# Patient Record
Sex: FEMALE | Race: WHITE | NOT HISPANIC OR LATINO | URBAN - METROPOLITAN AREA
[De-identification: names, ages, dates, MRNs, and addresses within clinical notes are randomized per-mention and may not be internally consistent; named-entity substitution may affect disease eponyms.]

---

## 2020-04-02 ENCOUNTER — OUTPATIENT (OUTPATIENT)
Dept: OUTPATIENT SERVICES | Facility: HOSPITAL | Age: 25
LOS: 1 days | Discharge: HOME | End: 2020-04-02

## 2020-04-20 ENCOUNTER — OUTPATIENT (OUTPATIENT)
Dept: OUTPATIENT SERVICES | Facility: HOSPITAL | Age: 25
LOS: 1 days | Discharge: HOME | End: 2020-04-20

## 2020-04-20 DIAGNOSIS — F41.1 GENERALIZED ANXIETY DISORDER: ICD-10-CM

## 2020-04-27 ENCOUNTER — OUTPATIENT (OUTPATIENT)
Dept: OUTPATIENT SERVICES | Facility: HOSPITAL | Age: 25
LOS: 1 days | Discharge: HOME | End: 2020-04-27

## 2020-04-28 DIAGNOSIS — F41.1 GENERALIZED ANXIETY DISORDER: ICD-10-CM

## 2020-05-01 DIAGNOSIS — F41.1 GENERALIZED ANXIETY DISORDER: ICD-10-CM

## 2020-05-06 ENCOUNTER — OUTPATIENT (OUTPATIENT)
Dept: OUTPATIENT SERVICES | Facility: HOSPITAL | Age: 25
LOS: 1 days | Discharge: HOME | End: 2020-05-06

## 2020-05-06 DIAGNOSIS — F41.1 GENERALIZED ANXIETY DISORDER: ICD-10-CM

## 2020-05-13 ENCOUNTER — OUTPATIENT (OUTPATIENT)
Dept: OUTPATIENT SERVICES | Facility: HOSPITAL | Age: 25
LOS: 1 days | Discharge: HOME | End: 2020-05-13

## 2020-05-13 DIAGNOSIS — F41.1 GENERALIZED ANXIETY DISORDER: ICD-10-CM

## 2020-05-19 ENCOUNTER — OUTPATIENT (OUTPATIENT)
Dept: OUTPATIENT SERVICES | Facility: HOSPITAL | Age: 25
LOS: 1 days | Discharge: HOME | End: 2020-05-19

## 2020-05-19 DIAGNOSIS — F33.1 MAJOR DEPRESSIVE DISORDER, RECURRENT, MODERATE: ICD-10-CM

## 2020-05-22 ENCOUNTER — OUTPATIENT (OUTPATIENT)
Dept: OUTPATIENT SERVICES | Facility: HOSPITAL | Age: 25
LOS: 1 days | Discharge: HOME | End: 2020-05-22

## 2020-05-22 DIAGNOSIS — F33.1 MAJOR DEPRESSIVE DISORDER, RECURRENT, MODERATE: ICD-10-CM

## 2020-06-04 ENCOUNTER — OUTPATIENT (OUTPATIENT)
Dept: OUTPATIENT SERVICES | Facility: HOSPITAL | Age: 25
LOS: 1 days | Discharge: HOME | End: 2020-06-04

## 2020-06-04 DIAGNOSIS — F33.1 MAJOR DEPRESSIVE DISORDER, RECURRENT, MODERATE: ICD-10-CM

## 2020-06-11 ENCOUNTER — OUTPATIENT (OUTPATIENT)
Dept: OUTPATIENT SERVICES | Facility: HOSPITAL | Age: 25
LOS: 1 days | Discharge: HOME | End: 2020-06-11

## 2020-06-11 DIAGNOSIS — F33.1 MAJOR DEPRESSIVE DISORDER, RECURRENT, MODERATE: ICD-10-CM

## 2020-06-18 ENCOUNTER — OUTPATIENT (OUTPATIENT)
Dept: OUTPATIENT SERVICES | Facility: HOSPITAL | Age: 25
LOS: 1 days | Discharge: HOME | End: 2020-06-18

## 2020-06-18 DIAGNOSIS — F33.1 MAJOR DEPRESSIVE DISORDER, RECURRENT, MODERATE: ICD-10-CM

## 2020-06-25 ENCOUNTER — OUTPATIENT (OUTPATIENT)
Dept: OUTPATIENT SERVICES | Facility: HOSPITAL | Age: 25
LOS: 1 days | Discharge: HOME | End: 2020-06-25

## 2020-06-25 DIAGNOSIS — F33.1 MAJOR DEPRESSIVE DISORDER, RECURRENT, MODERATE: ICD-10-CM

## 2020-07-02 ENCOUNTER — OUTPATIENT (OUTPATIENT)
Dept: OUTPATIENT SERVICES | Facility: HOSPITAL | Age: 25
LOS: 1 days | Discharge: HOME | End: 2020-07-02

## 2020-07-02 DIAGNOSIS — F33.1 MAJOR DEPRESSIVE DISORDER, RECURRENT, MODERATE: ICD-10-CM

## 2020-07-14 ENCOUNTER — OUTPATIENT (OUTPATIENT)
Dept: OUTPATIENT SERVICES | Facility: HOSPITAL | Age: 25
LOS: 1 days | Discharge: HOME | End: 2020-07-14
Payer: MEDICAID

## 2020-07-14 DIAGNOSIS — F33.1 MAJOR DEPRESSIVE DISORDER, RECURRENT, MODERATE: ICD-10-CM

## 2020-07-14 PROCEDURE — 99214 OFFICE O/P EST MOD 30 MIN: CPT | Mod: 95,GC

## 2020-07-16 ENCOUNTER — OUTPATIENT (OUTPATIENT)
Dept: OUTPATIENT SERVICES | Facility: HOSPITAL | Age: 25
LOS: 1 days | Discharge: HOME | End: 2020-07-16

## 2020-07-16 DIAGNOSIS — F33.1 MAJOR DEPRESSIVE DISORDER, RECURRENT, MODERATE: ICD-10-CM

## 2020-07-23 ENCOUNTER — OUTPATIENT (OUTPATIENT)
Dept: OUTPATIENT SERVICES | Facility: HOSPITAL | Age: 25
LOS: 1 days | Discharge: HOME | End: 2020-07-23

## 2020-07-23 DIAGNOSIS — F33.1 MAJOR DEPRESSIVE DISORDER, RECURRENT, MODERATE: ICD-10-CM

## 2020-08-04 ENCOUNTER — OUTPATIENT (OUTPATIENT)
Dept: OUTPATIENT SERVICES | Facility: HOSPITAL | Age: 25
LOS: 1 days | Discharge: HOME | End: 2020-08-04

## 2020-08-06 ENCOUNTER — OUTPATIENT (OUTPATIENT)
Dept: OUTPATIENT SERVICES | Facility: HOSPITAL | Age: 25
LOS: 1 days | Discharge: HOME | End: 2020-08-06

## 2020-08-06 DIAGNOSIS — F33.1 MAJOR DEPRESSIVE DISORDER, RECURRENT, MODERATE: ICD-10-CM

## 2020-08-13 ENCOUNTER — OUTPATIENT (OUTPATIENT)
Dept: OUTPATIENT SERVICES | Facility: HOSPITAL | Age: 25
LOS: 1 days | Discharge: HOME | End: 2020-08-13

## 2020-08-13 DIAGNOSIS — F33.1 MAJOR DEPRESSIVE DISORDER, RECURRENT, MODERATE: ICD-10-CM

## 2020-08-27 ENCOUNTER — OUTPATIENT (OUTPATIENT)
Dept: OUTPATIENT SERVICES | Facility: HOSPITAL | Age: 25
LOS: 1 days | Discharge: HOME | End: 2020-08-27

## 2020-08-27 DIAGNOSIS — F33.1 MAJOR DEPRESSIVE DISORDER, RECURRENT, MODERATE: ICD-10-CM

## 2020-09-01 ENCOUNTER — OUTPATIENT (OUTPATIENT)
Dept: OUTPATIENT SERVICES | Facility: HOSPITAL | Age: 25
LOS: 1 days | Discharge: HOME | End: 2020-09-01
Payer: MEDICAID

## 2020-09-01 DIAGNOSIS — F33.1 MAJOR DEPRESSIVE DISORDER, RECURRENT, MODERATE: ICD-10-CM

## 2020-09-01 PROCEDURE — 99214 OFFICE O/P EST MOD 30 MIN: CPT | Mod: 95,GC

## 2020-09-01 RX ORDER — BUPROPION HYDROCHLORIDE 150 MG/1
1 TABLET, EXTENDED RELEASE ORAL
Qty: 30 | Refills: 1
Start: 2020-09-01 | End: 2020-10-30

## 2020-09-01 RX ORDER — BUPROPION HYDROCHLORIDE 150 MG/1
1 TABLET, EXTENDED RELEASE ORAL
Qty: 30 | Refills: 1
Start: 2020-09-01 | End: 2020-12-25

## 2020-09-10 ENCOUNTER — OUTPATIENT (OUTPATIENT)
Dept: OUTPATIENT SERVICES | Facility: HOSPITAL | Age: 25
LOS: 1 days | Discharge: HOME | End: 2020-09-10

## 2020-09-10 DIAGNOSIS — F33.1 MAJOR DEPRESSIVE DISORDER, RECURRENT, MODERATE: ICD-10-CM

## 2020-09-24 ENCOUNTER — OUTPATIENT (OUTPATIENT)
Dept: OUTPATIENT SERVICES | Facility: HOSPITAL | Age: 25
LOS: 1 days | Discharge: HOME | End: 2020-09-24

## 2020-09-24 DIAGNOSIS — F33.1 MAJOR DEPRESSIVE DISORDER, RECURRENT, MODERATE: ICD-10-CM

## 2020-09-29 ENCOUNTER — OUTPATIENT (OUTPATIENT)
Dept: OUTPATIENT SERVICES | Facility: HOSPITAL | Age: 25
LOS: 1 days | Discharge: HOME | End: 2020-09-29

## 2020-09-29 DIAGNOSIS — F33.1 MAJOR DEPRESSIVE DISORDER, RECURRENT, MODERATE: ICD-10-CM

## 2020-10-05 ENCOUNTER — OUTPATIENT (OUTPATIENT)
Dept: OUTPATIENT SERVICES | Facility: HOSPITAL | Age: 25
LOS: 1 days | Discharge: HOME | End: 2020-10-05

## 2020-10-05 DIAGNOSIS — F33.1 MAJOR DEPRESSIVE DISORDER, RECURRENT, MODERATE: ICD-10-CM

## 2020-10-26 ENCOUNTER — OUTPATIENT (OUTPATIENT)
Dept: OUTPATIENT SERVICES | Facility: HOSPITAL | Age: 25
LOS: 1 days | Discharge: HOME | End: 2020-10-26

## 2020-10-26 DIAGNOSIS — F33.1 MAJOR DEPRESSIVE DISORDER, RECURRENT, MODERATE: ICD-10-CM

## 2020-10-27 ENCOUNTER — OUTPATIENT (OUTPATIENT)
Dept: OUTPATIENT SERVICES | Facility: HOSPITAL | Age: 25
LOS: 1 days | Discharge: HOME | End: 2020-10-27
Payer: MEDICAID

## 2020-10-27 DIAGNOSIS — F33.1 MAJOR DEPRESSIVE DISORDER, RECURRENT, MODERATE: ICD-10-CM

## 2020-10-27 PROCEDURE — 99214 OFFICE O/P EST MOD 30 MIN: CPT | Mod: 95,GC

## 2020-11-09 ENCOUNTER — OUTPATIENT (OUTPATIENT)
Dept: OUTPATIENT SERVICES | Facility: HOSPITAL | Age: 25
LOS: 1 days | Discharge: HOME | End: 2020-11-09

## 2020-11-10 DIAGNOSIS — F33.1 MAJOR DEPRESSIVE DISORDER, RECURRENT, MODERATE: ICD-10-CM

## 2020-11-23 ENCOUNTER — APPOINTMENT (OUTPATIENT)
Dept: PSYCHIATRY | Facility: CLINIC | Age: 25
End: 2020-11-23

## 2020-11-23 ENCOUNTER — OUTPATIENT (OUTPATIENT)
Dept: OUTPATIENT SERVICES | Facility: HOSPITAL | Age: 25
LOS: 1 days | Discharge: HOME | End: 2020-11-23

## 2020-11-23 DIAGNOSIS — F33.1 MAJOR DEPRESSIVE DISORDER, RECURRENT, MODERATE: ICD-10-CM

## 2020-12-07 ENCOUNTER — OUTPATIENT (OUTPATIENT)
Dept: OUTPATIENT SERVICES | Facility: HOSPITAL | Age: 25
LOS: 1 days | Discharge: HOME | End: 2020-12-07

## 2020-12-07 ENCOUNTER — APPOINTMENT (OUTPATIENT)
Dept: PSYCHIATRY | Facility: CLINIC | Age: 25
End: 2020-12-07

## 2020-12-07 DIAGNOSIS — F33.1 MAJOR DEPRESSIVE DISORDER, RECURRENT, MODERATE: ICD-10-CM

## 2020-12-07 PROBLEM — Z00.00 ENCOUNTER FOR PREVENTIVE HEALTH EXAMINATION: Status: ACTIVE | Noted: 2020-12-07

## 2020-12-09 DIAGNOSIS — Z81.8 FAMILY HISTORY OF OTHER MENTAL AND BEHAVIORAL DISORDERS: ICD-10-CM

## 2020-12-09 DIAGNOSIS — Q21.1 ATRIAL SEPTAL DEFECT: ICD-10-CM

## 2020-12-09 DIAGNOSIS — R01.1 CARDIAC MURMUR, UNSPECIFIED: ICD-10-CM

## 2020-12-09 DIAGNOSIS — Z91.5 PERSONAL HISTORY OF SELF-HARM: ICD-10-CM

## 2020-12-09 DIAGNOSIS — Z81.4 FAMILY HISTORY OF OTHER SUBSTANCE ABUSE AND DEPENDENCE: ICD-10-CM

## 2020-12-09 DIAGNOSIS — Z79.899 OTHER LONG TERM (CURRENT) DRUG THERAPY: ICD-10-CM

## 2020-12-21 ENCOUNTER — APPOINTMENT (OUTPATIENT)
Dept: PSYCHIATRY | Facility: CLINIC | Age: 25
End: 2020-12-21

## 2020-12-21 ENCOUNTER — OUTPATIENT (OUTPATIENT)
Dept: OUTPATIENT SERVICES | Facility: HOSPITAL | Age: 25
LOS: 1 days | Discharge: HOME | End: 2020-12-21

## 2020-12-21 DIAGNOSIS — F33.1 MAJOR DEPRESSIVE DISORDER, RECURRENT, MODERATE: ICD-10-CM

## 2020-12-29 ENCOUNTER — APPOINTMENT (OUTPATIENT)
Dept: PSYCHIATRY | Facility: CLINIC | Age: 25
End: 2020-12-29

## 2021-01-04 ENCOUNTER — OUTPATIENT (OUTPATIENT)
Dept: OUTPATIENT SERVICES | Facility: HOSPITAL | Age: 26
LOS: 1 days | Discharge: HOME | End: 2021-01-04

## 2021-01-04 ENCOUNTER — APPOINTMENT (OUTPATIENT)
Dept: PSYCHIATRY | Facility: CLINIC | Age: 26
End: 2021-01-04

## 2021-01-04 DIAGNOSIS — F33.1 MAJOR DEPRESSIVE DISORDER, RECURRENT, MODERATE: ICD-10-CM

## 2021-01-13 ENCOUNTER — APPOINTMENT (OUTPATIENT)
Dept: PSYCHIATRY | Facility: CLINIC | Age: 26
End: 2021-01-13

## 2021-01-13 ENCOUNTER — OUTPATIENT (OUTPATIENT)
Dept: OUTPATIENT SERVICES | Facility: HOSPITAL | Age: 26
LOS: 1 days | Discharge: HOME | End: 2021-01-13

## 2021-01-13 DIAGNOSIS — F33.1 MAJOR DEPRESSIVE DISORDER, RECURRENT, MODERATE: ICD-10-CM

## 2021-01-19 ENCOUNTER — APPOINTMENT (OUTPATIENT)
Dept: PSYCHIATRY | Facility: CLINIC | Age: 26
End: 2021-01-19

## 2021-01-19 ENCOUNTER — OUTPATIENT (OUTPATIENT)
Dept: OUTPATIENT SERVICES | Facility: HOSPITAL | Age: 26
LOS: 1 days | Discharge: HOME | End: 2021-01-19

## 2021-01-19 DIAGNOSIS — F33.1 MAJOR DEPRESSIVE DISORDER, RECURRENT, MODERATE: ICD-10-CM

## 2021-01-20 ENCOUNTER — NON-APPOINTMENT (OUTPATIENT)
Age: 26
End: 2021-01-20

## 2021-01-22 ENCOUNTER — APPOINTMENT (OUTPATIENT)
Dept: PSYCHIATRY | Facility: CLINIC | Age: 26
End: 2021-01-22

## 2021-01-22 ENCOUNTER — OUTPATIENT (OUTPATIENT)
Dept: OUTPATIENT SERVICES | Facility: HOSPITAL | Age: 26
LOS: 1 days | Discharge: HOME | End: 2021-01-22

## 2021-01-22 DIAGNOSIS — F33.1 MAJOR DEPRESSIVE DISORDER, RECURRENT, MODERATE: ICD-10-CM

## 2021-01-29 ENCOUNTER — APPOINTMENT (OUTPATIENT)
Dept: PSYCHIATRY | Facility: CLINIC | Age: 26
End: 2021-01-29

## 2021-01-29 ENCOUNTER — OUTPATIENT (OUTPATIENT)
Dept: OUTPATIENT SERVICES | Facility: HOSPITAL | Age: 26
LOS: 1 days | Discharge: HOME | End: 2021-01-29

## 2021-01-29 DIAGNOSIS — F33.1 MAJOR DEPRESSIVE DISORDER, RECURRENT, MODERATE: ICD-10-CM

## 2021-02-02 ENCOUNTER — APPOINTMENT (OUTPATIENT)
Dept: PSYCHIATRY | Facility: CLINIC | Age: 26
End: 2021-02-02
Payer: MEDICAID

## 2021-02-02 PROCEDURE — ZZZZZ: CPT

## 2021-02-16 ENCOUNTER — APPOINTMENT (OUTPATIENT)
Dept: PSYCHIATRY | Facility: CLINIC | Age: 26
End: 2021-02-16

## 2021-02-17 ENCOUNTER — OUTPATIENT (OUTPATIENT)
Dept: OUTPATIENT SERVICES | Facility: HOSPITAL | Age: 26
LOS: 1 days | Discharge: HOME | End: 2021-02-17

## 2021-02-17 ENCOUNTER — APPOINTMENT (OUTPATIENT)
Dept: PSYCHIATRY | Facility: CLINIC | Age: 26
End: 2021-02-17

## 2021-02-17 DIAGNOSIS — F33.1 MAJOR DEPRESSIVE DISORDER, RECURRENT, MODERATE: ICD-10-CM

## 2021-02-25 ENCOUNTER — OUTPATIENT (OUTPATIENT)
Dept: OUTPATIENT SERVICES | Facility: HOSPITAL | Age: 26
LOS: 1 days | Discharge: HOME | End: 2021-02-25

## 2021-02-25 ENCOUNTER — APPOINTMENT (OUTPATIENT)
Dept: PSYCHIATRY | Facility: CLINIC | Age: 26
End: 2021-02-25

## 2021-02-25 DIAGNOSIS — F33.1 MAJOR DEPRESSIVE DISORDER, RECURRENT, MODERATE: ICD-10-CM

## 2021-03-03 ENCOUNTER — APPOINTMENT (OUTPATIENT)
Dept: PSYCHIATRY | Facility: CLINIC | Age: 26
End: 2021-03-03

## 2021-03-03 ENCOUNTER — OUTPATIENT (OUTPATIENT)
Dept: OUTPATIENT SERVICES | Facility: HOSPITAL | Age: 26
LOS: 1 days | Discharge: HOME | End: 2021-03-03

## 2021-03-03 DIAGNOSIS — F33.1 MAJOR DEPRESSIVE DISORDER, RECURRENT, MODERATE: ICD-10-CM

## 2021-03-12 ENCOUNTER — APPOINTMENT (OUTPATIENT)
Dept: PSYCHIATRY | Facility: CLINIC | Age: 26
End: 2021-03-12

## 2021-03-12 ENCOUNTER — OUTPATIENT (OUTPATIENT)
Dept: OUTPATIENT SERVICES | Facility: HOSPITAL | Age: 26
LOS: 1 days | Discharge: HOME | End: 2021-03-12

## 2021-03-12 DIAGNOSIS — F33.1 MAJOR DEPRESSIVE DISORDER, RECURRENT, MODERATE: ICD-10-CM

## 2021-03-16 ENCOUNTER — APPOINTMENT (OUTPATIENT)
Dept: PSYCHIATRY | Facility: CLINIC | Age: 26
End: 2021-03-16

## 2021-04-13 ENCOUNTER — APPOINTMENT (OUTPATIENT)
Dept: PSYCHIATRY | Facility: CLINIC | Age: 26
End: 2021-04-13

## 2021-04-16 ENCOUNTER — APPOINTMENT (OUTPATIENT)
Dept: PSYCHIATRY | Facility: CLINIC | Age: 26
End: 2021-04-16

## 2021-04-16 ENCOUNTER — OUTPATIENT (OUTPATIENT)
Dept: OUTPATIENT SERVICES | Facility: HOSPITAL | Age: 26
LOS: 1 days | Discharge: HOME | End: 2021-04-16

## 2021-04-16 DIAGNOSIS — F33.1 MAJOR DEPRESSIVE DISORDER, RECURRENT, MODERATE: ICD-10-CM

## 2021-04-26 ENCOUNTER — OUTPATIENT (OUTPATIENT)
Dept: OUTPATIENT SERVICES | Facility: HOSPITAL | Age: 26
LOS: 1 days | Discharge: HOME | End: 2021-04-26

## 2021-04-26 ENCOUNTER — APPOINTMENT (OUTPATIENT)
Dept: PSYCHIATRY | Facility: CLINIC | Age: 26
End: 2021-04-26

## 2021-04-26 DIAGNOSIS — F33.1 MAJOR DEPRESSIVE DISORDER, RECURRENT, MODERATE: ICD-10-CM

## 2021-05-04 RX ORDER — BUPROPION HYDROCHLORIDE 100 MG/1
TABLET, FILM COATED ORAL
Refills: 0 | Status: DISCONTINUED | COMMUNITY
End: 2021-05-04

## 2021-05-07 ENCOUNTER — NON-APPOINTMENT (OUTPATIENT)
Age: 26
End: 2021-05-07

## 2021-05-18 ENCOUNTER — APPOINTMENT (OUTPATIENT)
Dept: PSYCHIATRY | Facility: CLINIC | Age: 26
End: 2021-05-18
Payer: MEDICAID

## 2021-05-18 ENCOUNTER — OUTPATIENT (OUTPATIENT)
Dept: OUTPATIENT SERVICES | Facility: HOSPITAL | Age: 26
LOS: 1 days | Discharge: HOME | End: 2021-05-18

## 2021-05-18 DIAGNOSIS — F33.1 MAJOR DEPRESSIVE DISORDER, RECURRENT, MODERATE: ICD-10-CM

## 2021-05-18 PROCEDURE — 99214 OFFICE O/P EST MOD 30 MIN: CPT | Mod: 95,GC

## 2021-05-19 ENCOUNTER — NON-APPOINTMENT (OUTPATIENT)
Age: 26
End: 2021-05-19

## 2021-05-26 ENCOUNTER — APPOINTMENT (OUTPATIENT)
Dept: PSYCHIATRY | Facility: CLINIC | Age: 26
End: 2021-05-26

## 2021-06-02 ENCOUNTER — APPOINTMENT (OUTPATIENT)
Dept: PSYCHIATRY | Facility: CLINIC | Age: 26
End: 2021-06-02

## 2021-06-02 ENCOUNTER — NON-APPOINTMENT (OUTPATIENT)
Age: 26
End: 2021-06-02

## 2021-06-15 ENCOUNTER — OUTPATIENT (OUTPATIENT)
Dept: OUTPATIENT SERVICES | Facility: HOSPITAL | Age: 26
LOS: 1 days | Discharge: HOME | End: 2021-06-15

## 2021-06-15 ENCOUNTER — APPOINTMENT (OUTPATIENT)
Dept: PSYCHIATRY | Facility: CLINIC | Age: 26
End: 2021-06-15

## 2021-06-15 DIAGNOSIS — F33.1 MAJOR DEPRESSIVE DISORDER, RECURRENT, MODERATE: ICD-10-CM

## 2021-06-16 ENCOUNTER — OUTPATIENT (OUTPATIENT)
Dept: OUTPATIENT SERVICES | Facility: HOSPITAL | Age: 26
LOS: 1 days | Discharge: HOME | End: 2021-06-16

## 2021-06-16 ENCOUNTER — APPOINTMENT (OUTPATIENT)
Dept: PSYCHIATRY | Facility: CLINIC | Age: 26
End: 2021-06-16

## 2021-06-16 DIAGNOSIS — F33.1 MAJOR DEPRESSIVE DISORDER, RECURRENT, MODERATE: ICD-10-CM

## 2021-07-02 ENCOUNTER — APPOINTMENT (OUTPATIENT)
Dept: PSYCHIATRY | Facility: CLINIC | Age: 26
End: 2021-07-02

## 2021-07-02 ENCOUNTER — OUTPATIENT (OUTPATIENT)
Dept: OUTPATIENT SERVICES | Facility: HOSPITAL | Age: 26
LOS: 1 days | Discharge: HOME | End: 2021-07-02

## 2021-07-02 DIAGNOSIS — F33.1 MAJOR DEPRESSIVE DISORDER, RECURRENT, MODERATE: ICD-10-CM

## 2021-07-13 ENCOUNTER — APPOINTMENT (OUTPATIENT)
Dept: PSYCHIATRY | Facility: CLINIC | Age: 26
End: 2021-07-13

## 2021-07-14 ENCOUNTER — APPOINTMENT (OUTPATIENT)
Dept: PSYCHIATRY | Facility: CLINIC | Age: 26
End: 2021-07-14

## 2021-07-14 ENCOUNTER — OUTPATIENT (OUTPATIENT)
Dept: OUTPATIENT SERVICES | Facility: HOSPITAL | Age: 26
LOS: 1 days | Discharge: HOME | End: 2021-07-14

## 2021-07-14 DIAGNOSIS — F60.3 BORDERLINE PERSONALITY DISORDER: ICD-10-CM

## 2021-07-14 DIAGNOSIS — F43.10 POST-TRAUMATIC STRESS DISORDER, UNSPECIFIED: ICD-10-CM

## 2021-07-14 DIAGNOSIS — F32.9 MAJOR DEPRESSIVE DISORDER, SINGLE EPISODE, UNSPECIFIED: ICD-10-CM

## 2021-07-30 ENCOUNTER — OUTPATIENT (OUTPATIENT)
Dept: OUTPATIENT SERVICES | Facility: HOSPITAL | Age: 26
LOS: 1 days | Discharge: HOME | End: 2021-07-30

## 2021-07-30 ENCOUNTER — APPOINTMENT (OUTPATIENT)
Dept: PSYCHIATRY | Facility: CLINIC | Age: 26
End: 2021-07-30

## 2021-07-30 DIAGNOSIS — F32.9 MAJOR DEPRESSIVE DISORDER, SINGLE EPISODE, UNSPECIFIED: ICD-10-CM

## 2021-07-30 DIAGNOSIS — F43.10 POST-TRAUMATIC STRESS DISORDER, UNSPECIFIED: ICD-10-CM

## 2021-07-30 DIAGNOSIS — F60.3 BORDERLINE PERSONALITY DISORDER: ICD-10-CM

## 2021-08-09 ENCOUNTER — APPOINTMENT (OUTPATIENT)
Dept: OBGYN | Facility: CLINIC | Age: 26
End: 2021-08-09

## 2021-08-13 ENCOUNTER — APPOINTMENT (OUTPATIENT)
Dept: PSYCHIATRY | Facility: CLINIC | Age: 26
End: 2021-08-13

## 2021-08-13 ENCOUNTER — OUTPATIENT (OUTPATIENT)
Dept: OUTPATIENT SERVICES | Facility: HOSPITAL | Age: 26
LOS: 1 days | Discharge: HOME | End: 2021-08-13

## 2021-08-13 DIAGNOSIS — F32.9 MAJOR DEPRESSIVE DISORDER, SINGLE EPISODE, UNSPECIFIED: ICD-10-CM

## 2021-08-13 DIAGNOSIS — F60.3 BORDERLINE PERSONALITY DISORDER: ICD-10-CM

## 2021-08-13 DIAGNOSIS — F43.10 POST-TRAUMATIC STRESS DISORDER, UNSPECIFIED: ICD-10-CM

## 2021-08-16 ENCOUNTER — NON-APPOINTMENT (OUTPATIENT)
Age: 26
End: 2021-08-16

## 2021-08-18 ENCOUNTER — APPOINTMENT (OUTPATIENT)
Dept: PSYCHIATRY | Facility: CLINIC | Age: 26
End: 2021-08-18

## 2021-08-23 ENCOUNTER — APPOINTMENT (OUTPATIENT)
Dept: PSYCHIATRY | Facility: CLINIC | Age: 26
End: 2021-08-23

## 2021-08-23 ENCOUNTER — OUTPATIENT (OUTPATIENT)
Dept: OUTPATIENT SERVICES | Facility: HOSPITAL | Age: 26
LOS: 1 days | Discharge: HOME | End: 2021-08-23

## 2021-08-23 DIAGNOSIS — F60.3 BORDERLINE PERSONALITY DISORDER: ICD-10-CM

## 2021-08-23 DIAGNOSIS — F43.10 POST-TRAUMATIC STRESS DISORDER, UNSPECIFIED: ICD-10-CM

## 2021-08-23 DIAGNOSIS — F32.9 MAJOR DEPRESSIVE DISORDER, SINGLE EPISODE, UNSPECIFIED: ICD-10-CM

## 2021-08-23 RX ORDER — QUETIAPINE 200 MG/1
200 TABLET, FILM COATED, EXTENDED RELEASE ORAL AT BEDTIME
Qty: 14 | Refills: 0 | Status: DISCONTINUED | COMMUNITY
Start: 2021-08-13 | End: 2021-08-23

## 2021-08-23 RX ORDER — QUETIAPINE 50 MG/1
50 TABLET, FILM COATED, EXTENDED RELEASE ORAL
Qty: 21 | Refills: 0 | Status: DISCONTINUED | COMMUNITY
Start: 2021-02-16 | End: 2021-08-23

## 2021-08-24 ENCOUNTER — APPOINTMENT (OUTPATIENT)
Dept: PSYCHIATRY | Facility: CLINIC | Age: 26
End: 2021-08-24

## 2021-08-30 ENCOUNTER — APPOINTMENT (OUTPATIENT)
Dept: PSYCHIATRY | Facility: CLINIC | Age: 26
End: 2021-08-30

## 2021-08-30 ENCOUNTER — OUTPATIENT (OUTPATIENT)
Dept: OUTPATIENT SERVICES | Facility: HOSPITAL | Age: 26
LOS: 1 days | Discharge: HOME | End: 2021-08-30

## 2021-08-30 DIAGNOSIS — F32.9 MAJOR DEPRESSIVE DISORDER, SINGLE EPISODE, UNSPECIFIED: ICD-10-CM

## 2021-08-30 DIAGNOSIS — F60.3 BORDERLINE PERSONALITY DISORDER: ICD-10-CM

## 2021-08-30 DIAGNOSIS — F43.10 POST-TRAUMATIC STRESS DISORDER, UNSPECIFIED: ICD-10-CM

## 2021-08-30 RX ORDER — BUPROPION HYDROCHLORIDE 300 MG/1
300 TABLET, EXTENDED RELEASE ORAL DAILY
Qty: 30 | Refills: 0 | Status: DISCONTINUED | COMMUNITY
Start: 2021-05-04 | End: 2021-08-30

## 2021-08-30 RX ORDER — BUPROPION HYDROCHLORIDE 150 MG/1
150 TABLET, EXTENDED RELEASE ORAL DAILY
Qty: 30 | Refills: 0 | Status: DISCONTINUED | COMMUNITY
Start: 2021-05-04 | End: 2021-08-30

## 2021-09-01 ENCOUNTER — NON-APPOINTMENT (OUTPATIENT)
Age: 26
End: 2021-09-01

## 2021-09-04 ENCOUNTER — TRANSCRIPTION ENCOUNTER (OUTPATIENT)
Age: 26
End: 2021-09-04

## 2021-09-06 ENCOUNTER — EMERGENCY (EMERGENCY)
Facility: HOSPITAL | Age: 26
LOS: 0 days | Discharge: HOME | End: 2021-09-06
Attending: STUDENT IN AN ORGANIZED HEALTH CARE EDUCATION/TRAINING PROGRAM | Admitting: STUDENT IN AN ORGANIZED HEALTH CARE EDUCATION/TRAINING PROGRAM
Payer: MEDICAID

## 2021-09-06 VITALS
RESPIRATION RATE: 18 BRPM | DIASTOLIC BLOOD PRESSURE: 82 MMHG | OXYGEN SATURATION: 99 % | WEIGHT: 145.06 LBS | TEMPERATURE: 97 F | HEART RATE: 85 BPM | SYSTOLIC BLOOD PRESSURE: 136 MMHG

## 2021-09-06 DIAGNOSIS — R20.2 PARESTHESIA OF SKIN: ICD-10-CM

## 2021-09-06 DIAGNOSIS — Z79.899 OTHER LONG TERM (CURRENT) DRUG THERAPY: ICD-10-CM

## 2021-09-06 DIAGNOSIS — Z87.19 PERSONAL HISTORY OF OTHER DISEASES OF THE DIGESTIVE SYSTEM: ICD-10-CM

## 2021-09-06 LAB
ALBUMIN SERPL ELPH-MCNC: 4.5 G/DL — SIGNIFICANT CHANGE UP (ref 3.5–5.2)
ALP SERPL-CCNC: 79 U/L — SIGNIFICANT CHANGE UP (ref 30–115)
ALT FLD-CCNC: 21 U/L — SIGNIFICANT CHANGE UP (ref 0–41)
ANION GAP SERPL CALC-SCNC: 10 MMOL/L — SIGNIFICANT CHANGE UP (ref 7–14)
AST SERPL-CCNC: 18 U/L — SIGNIFICANT CHANGE UP (ref 0–41)
BASOPHILS # BLD AUTO: 0.03 K/UL — SIGNIFICANT CHANGE UP (ref 0–0.2)
BASOPHILS NFR BLD AUTO: 0.4 % — SIGNIFICANT CHANGE UP (ref 0–1)
BILIRUB SERPL-MCNC: 0.2 MG/DL — SIGNIFICANT CHANGE UP (ref 0.2–1.2)
BUN SERPL-MCNC: 11 MG/DL — SIGNIFICANT CHANGE UP (ref 10–20)
CALCIUM SERPL-MCNC: 9.3 MG/DL — SIGNIFICANT CHANGE UP (ref 8.5–10.1)
CHLORIDE SERPL-SCNC: 102 MMOL/L — SIGNIFICANT CHANGE UP (ref 98–110)
CO2 SERPL-SCNC: 25 MMOL/L — SIGNIFICANT CHANGE UP (ref 17–32)
CREAT SERPL-MCNC: 0.7 MG/DL — SIGNIFICANT CHANGE UP (ref 0.7–1.5)
EOSINOPHIL # BLD AUTO: 0.14 K/UL — SIGNIFICANT CHANGE UP (ref 0–0.7)
EOSINOPHIL NFR BLD AUTO: 1.7 % — SIGNIFICANT CHANGE UP (ref 0–8)
GLUCOSE SERPL-MCNC: 96 MG/DL — SIGNIFICANT CHANGE UP (ref 70–99)
HCT VFR BLD CALC: 42.1 % — SIGNIFICANT CHANGE UP (ref 37–47)
HGB BLD-MCNC: 14.6 G/DL — SIGNIFICANT CHANGE UP (ref 12–16)
IMM GRANULOCYTES NFR BLD AUTO: 0.4 % — HIGH (ref 0.1–0.3)
LYMPHOCYTES # BLD AUTO: 1.97 K/UL — SIGNIFICANT CHANGE UP (ref 1.2–3.4)
LYMPHOCYTES # BLD AUTO: 24.6 % — SIGNIFICANT CHANGE UP (ref 20.5–51.1)
MAGNESIUM SERPL-MCNC: 1.9 MG/DL — SIGNIFICANT CHANGE UP (ref 1.8–2.4)
MCHC RBC-ENTMCNC: 29.4 PG — SIGNIFICANT CHANGE UP (ref 27–31)
MCHC RBC-ENTMCNC: 34.7 G/DL — SIGNIFICANT CHANGE UP (ref 32–37)
MCV RBC AUTO: 84.9 FL — SIGNIFICANT CHANGE UP (ref 81–99)
MONOCYTES # BLD AUTO: 0.63 K/UL — HIGH (ref 0.1–0.6)
MONOCYTES NFR BLD AUTO: 7.9 % — SIGNIFICANT CHANGE UP (ref 1.7–9.3)
NEUTROPHILS # BLD AUTO: 5.22 K/UL — SIGNIFICANT CHANGE UP (ref 1.4–6.5)
NEUTROPHILS NFR BLD AUTO: 65 % — SIGNIFICANT CHANGE UP (ref 42.2–75.2)
NRBC # BLD: 0 /100 WBCS — SIGNIFICANT CHANGE UP (ref 0–0)
PLATELET # BLD AUTO: 252 K/UL — SIGNIFICANT CHANGE UP (ref 130–400)
POTASSIUM SERPL-MCNC: 4.1 MMOL/L — SIGNIFICANT CHANGE UP (ref 3.5–5)
POTASSIUM SERPL-SCNC: 4.1 MMOL/L — SIGNIFICANT CHANGE UP (ref 3.5–5)
PROT SERPL-MCNC: 6.9 G/DL — SIGNIFICANT CHANGE UP (ref 6–8)
RBC # BLD: 4.96 M/UL — SIGNIFICANT CHANGE UP (ref 4.2–5.4)
RBC # FLD: 11.4 % — LOW (ref 11.5–14.5)
SODIUM SERPL-SCNC: 137 MMOL/L — SIGNIFICANT CHANGE UP (ref 135–146)
WBC # BLD: 8.02 K/UL — SIGNIFICANT CHANGE UP (ref 4.8–10.8)
WBC # FLD AUTO: 8.02 K/UL — SIGNIFICANT CHANGE UP (ref 4.8–10.8)

## 2021-09-06 PROCEDURE — 99284 EMERGENCY DEPT VISIT MOD MDM: CPT

## 2021-09-06 RX ORDER — GABAPENTIN 400 MG/1
2 CAPSULE ORAL
Qty: 4 | Refills: 0
Start: 2021-09-06 | End: 2021-09-07

## 2021-09-06 NOTE — ED ADULT NURSE NOTE - NSIMPLEMENTINTERV_GEN_ALL_ED
Implemented All Universal Safety Interventions:  [place] to call system. Call bell, personal items and telephone within reach. Instruct patient to call for assistance. Room bathroom lighting operational. Non-slip footwear when patient is off stretcher. Physically safe environment: no spills, clutter or unnecessary equipment. Stretcher in lowest position, wheels locked, appropriate side rails in place.

## 2021-09-06 NOTE — ED ADULT NURSE NOTE - OBJECTIVE STATEMENT
Pt presents to ED c/o numbness/tingling in hands and feet that has worsened this month. Pt came into ED today c/o fecal incontinence episode today. Pt states that she believes it is related to the numbness. "The feeling keeps me awake at night"

## 2021-09-06 NOTE — ED ADULT TRIAGE NOTE - CHIEF COMPLAINT QUOTE
Pt presents to ED c/o numbness/tingling in hands and feet that has worsened this month. Pt came into ED today c/o fecal incontinence episode today. Pt states that she believes it is related to the numbness.

## 2021-09-06 NOTE — ED PROVIDER NOTE - NSFOLLOWUPINSTRUCTIONS_ED_ALL_ED_FT
Follow up with PMD and Neurology in 1-2 days.    Paresthesia    WHAT YOU NEED TO KNOW:    Paresthesia is numbness, tingling, or burning. It can happen in any part of your body, but usually occurs in your legs, feet, arms, or hands.    DISCHARGE INSTRUCTIONS:    Return to the emergency department if:     You have severe pain along with numbness and tingling.      Your legs suddenly become cold. You have trouble moving your legs, and they ache.      You have increased weakness in a part of your body.      You have uncontrolled movements.    Contact your healthcare provider or neurologist if:     Your symptoms do not improve.      You have symptoms in more than one part of your body.      You have questions or concerns about your condition or care.    Manage paresthesia:     Protect the area from injury. You may injure or burn yourself if you lose feeling in the area. Be careful when you touch anything that could be hot. Wear sturdy shoes to protect your feet. Ask about other ways to protect yourself.       Go to physical or occupational therapy if directed. Your provider may recommend therapy if you have a condition such as carpal tunnel syndrome. A physical therapist can teach you exercises to help strengthen the area or increase your ability to move it. An occupational therapist can help you find new ways to do your daily activities.      Manage health conditions that can cause paresthesia. Work with your diabetes specialist if you have uncontrolled diabetes. A dietitian or your healthcare provider can help you create a meal plan if you have low vitamin B levels. Your provider can help you manage your health if you have multiple sclerosis or you had a stroke. It is important to manage health conditions to stop paresthesia or prevent it from getting worse.    Follow up with your healthcare provider or neurologist as directed: Your healthcare provider may refer you to a specialist. Write down your questions so you remember to ask them during your visits.       © Copyright [employer] 2019 All illustrations and images included in CareNotes are the copyrighted property of A.D.A.M., Inc. or [employer].

## 2021-09-06 NOTE — ED PROVIDER NOTE - PATIENT PORTAL LINK FT
You can access the FollowMyHealth Patient Portal offered by [facility] by registering at the following website: [email or web address]. By joining [employer]’s FollowMyHealth portal, you will also be able to view your health information using other applications (apps) compatible with our system.

## 2021-09-06 NOTE — ED PROVIDER NOTE - NS ED ROS FT
Constitutional: (-) fever  Eyes/ENT: (-) blurry vision, (-) epistaxis  Cardiovascular: (-) chest pain, (-) syncope  Respiratory: (-) cough, (-) shortness of breath  Gastrointestinal: (-) vomiting, (-) diarrhea  : (-) dysuria, (-) hematuria  Musculoskeletal: (-) neck pain, (-) back pain, (-) joint pain  Integumentary: (-) rash, (-) edema  Neurological: (+) tingling, (-) headache, (-) altered mental status  Allergic/Immunologic: (-) pruritus

## 2021-09-06 NOTE — ED PROVIDER NOTE - OBJECTIVE STATEMENT
26y F pmh IBS presents for eval of tingling. Pt has UE and LE tingling for years that has been lasting longer for past two weeks, no aggravating or relieving factors. Went to [facility] yesterday with MRI spine showing mild spinal stenosis. Started a new medication for IBS and deficated on herself while walking down stairs.

## 2021-09-06 NOTE — ED PROVIDER NOTE - CLINICAL SUMMARY MEDICAL DECISION MAKING FREE TEXT BOX
26F with PMH IBS constipation type who presents to [facility] for an episode of fecal incontinence that occurred while going to dinner. She has had workup with neurology with MRI C/T/L spine and has had prior sx with [facility]. Rectal tone normal on exam, discussed with neurology with shared decision making regarding OP f/u. Labs reviewed and wnl. On reassessment pt well appearing, agrees to f/u with her PCP and neurologist. I have fully discussed the medical management and delivery of care with the patient. I have discussed any available labs, imaging and treatment options with the patient. All Questions answered at the bedside and printed copies of all results provided and recommended to review with PCP. Patient confirms understanding and has been given detailed return precautions. Patient instructed to return to the ED should symptoms persist or worsen. Patient has demonstrated capacity and has verbalized understanding. Patient is well appearing upon discharge, ambulatory with a steady gait.

## 2021-09-06 NOTE — ED PROVIDER NOTE - CARE PROVIDER_API CALL
Troy Johnson)  Neurology  [address] [place] [address], [address]  [place] [address]  Phone: [phone]  Fax: [phone]  Follow Up Time:

## 2021-09-06 NOTE — ED PROVIDER NOTE - PHYSICAL EXAMINATION
CONST: Well appearing in NAD  EYES: PERRL, EOMI, Sclera and conjunctiva clear.   ENT: No nasal discharge. Oropharynx normal appearing, no erythema or exudates. No abscess or swelling. Uvula midline.   NECK: Non-tender, no meningeal signs. normal ROM. supple   CARD: S1 S2; No jvd  RESP: Equal BS B/L, No wheezes, rhonchi or rales. No distress  GI: Soft, non-tender, non-distended. no cva tenderness. normal BS  MS: Normal ROM in all extremities. pulses 2 +. no calf tenderness or swelling  SKIN: Warm, dry, no acute rashes. Good turgor  NEURO: A&Ox3, No focal deficits. Strength 5/5 with no sensory deficits. Steady gait. Finger to nose intact. Negative pronator drift

## 2021-09-07 RX ORDER — GABAPENTIN 400 MG/1
1 CAPSULE ORAL
Qty: 6 | Refills: 0
Start: 2021-09-07 | End: 2021-09-08

## 2021-09-14 ENCOUNTER — APPOINTMENT (OUTPATIENT)
Dept: PSYCHIATRY | Facility: CLINIC | Age: 26
End: 2021-09-14

## 2021-09-15 ENCOUNTER — APPOINTMENT (OUTPATIENT)
Dept: PSYCHIATRY | Facility: CLINIC | Age: 26
End: 2021-09-15

## 2021-09-15 ENCOUNTER — OUTPATIENT (OUTPATIENT)
Dept: OUTPATIENT SERVICES | Facility: HOSPITAL | Age: 26
LOS: 1 days | Discharge: HOME | End: 2021-09-15

## 2021-09-15 DIAGNOSIS — F43.10 POST-TRAUMATIC STRESS DISORDER, UNSPECIFIED: ICD-10-CM

## 2021-09-15 DIAGNOSIS — F32.9 MAJOR DEPRESSIVE DISORDER, SINGLE EPISODE, UNSPECIFIED: ICD-10-CM

## 2021-09-15 DIAGNOSIS — F60.3 BORDERLINE PERSONALITY DISORDER: ICD-10-CM

## 2021-09-15 RX ORDER — VENLAFAXINE 37.5 MG/1
37.5 TABLET, EXTENDED RELEASE ORAL
Qty: 53 | Refills: 0 | Status: DISCONTINUED | COMMUNITY
Start: 2021-08-30 | End: 2021-09-15

## 2021-09-15 RX ORDER — QUETIAPINE 300 MG/1
300 TABLET, FILM COATED, EXTENDED RELEASE ORAL DAILY
Qty: 30 | Refills: 0 | Status: DISCONTINUED | COMMUNITY
Start: 2021-08-23 | End: 2021-09-15

## 2021-09-15 RX ORDER — QUETIAPINE 50 MG/1
50 TABLET, FILM COATED, EXTENDED RELEASE ORAL
Qty: 60 | Refills: 0 | Status: DISCONTINUED | COMMUNITY
Start: 2021-08-30 | End: 2021-09-15

## 2021-09-18 ENCOUNTER — EMERGENCY (EMERGENCY)
Facility: HOSPITAL | Age: 26
LOS: 0 days | Discharge: HOME | End: 2021-09-18
Attending: EMERGENCY MEDICINE | Admitting: EMERGENCY MEDICINE
Payer: MEDICAID

## 2021-09-18 VITALS — DIASTOLIC BLOOD PRESSURE: 91 MMHG | HEART RATE: 80 BPM | TEMPERATURE: 98 F | SYSTOLIC BLOOD PRESSURE: 130 MMHG

## 2021-09-18 VITALS
RESPIRATION RATE: 18 BRPM | SYSTOLIC BLOOD PRESSURE: 120 MMHG | HEART RATE: 102 BPM | WEIGHT: 139.99 LBS | TEMPERATURE: 98 F | HEIGHT: 59 IN | DIASTOLIC BLOOD PRESSURE: 82 MMHG | OXYGEN SATURATION: 98 %

## 2021-09-18 DIAGNOSIS — R19.7 DIARRHEA, UNSPECIFIED: ICD-10-CM

## 2021-09-18 DIAGNOSIS — Z88.1 ALLERGY STATUS TO OTHER ANTIBIOTIC AGENTS STATUS: ICD-10-CM

## 2021-09-18 DIAGNOSIS — Z88.8 ALLERGY STATUS TO OTHER DRUGS, MEDICAMENTS AND BIOLOGICAL SUBSTANCES STATUS: ICD-10-CM

## 2021-09-18 DIAGNOSIS — Z86.59 PERSONAL HISTORY OF OTHER MENTAL AND BEHAVIORAL DISORDERS: ICD-10-CM

## 2021-09-18 DIAGNOSIS — R10.84 GENERALIZED ABDOMINAL PAIN: ICD-10-CM

## 2021-09-18 DIAGNOSIS — R10.32 LEFT LOWER QUADRANT PAIN: ICD-10-CM

## 2021-09-18 DIAGNOSIS — F43.10 POST-TRAUMATIC STRESS DISORDER, UNSPECIFIED: ICD-10-CM

## 2021-09-18 DIAGNOSIS — K52.9 NONINFECTIVE GASTROENTERITIS AND COLITIS, UNSPECIFIED: ICD-10-CM

## 2021-09-18 DIAGNOSIS — Z87.19 PERSONAL HISTORY OF OTHER DISEASES OF THE DIGESTIVE SYSTEM: ICD-10-CM

## 2021-09-18 DIAGNOSIS — Z91.018 ALLERGY TO OTHER FOODS: ICD-10-CM

## 2021-09-18 DIAGNOSIS — F32.9 MAJOR DEPRESSIVE DISORDER, SINGLE EPISODE, UNSPECIFIED: ICD-10-CM

## 2021-09-18 LAB
ALBUMIN SERPL ELPH-MCNC: 4.4 G/DL — SIGNIFICANT CHANGE UP (ref 3.5–5.2)
ALP SERPL-CCNC: 78 U/L — SIGNIFICANT CHANGE UP (ref 30–115)
ALT FLD-CCNC: 35 U/L — SIGNIFICANT CHANGE UP (ref 0–41)
ANION GAP SERPL CALC-SCNC: 10 MMOL/L — SIGNIFICANT CHANGE UP (ref 7–14)
APPEARANCE UR: CLEAR — SIGNIFICANT CHANGE UP
AST SERPL-CCNC: 22 U/L — SIGNIFICANT CHANGE UP (ref 0–41)
BASOPHILS # BLD AUTO: 0.04 K/UL — SIGNIFICANT CHANGE UP (ref 0–0.2)
BASOPHILS NFR BLD AUTO: 0.5 % — SIGNIFICANT CHANGE UP (ref 0–1)
BILIRUB DIRECT SERPL-MCNC: <0.2 MG/DL — SIGNIFICANT CHANGE UP (ref 0–0.2)
BILIRUB INDIRECT FLD-MCNC: SIGNIFICANT CHANGE UP MG/DL (ref 0.2–1.2)
BILIRUB SERPL-MCNC: <0.2 MG/DL — SIGNIFICANT CHANGE UP (ref 0.2–1.2)
BILIRUB UR-MCNC: NEGATIVE — SIGNIFICANT CHANGE UP
BUN SERPL-MCNC: 12 MG/DL — SIGNIFICANT CHANGE UP (ref 10–20)
CALCIUM SERPL-MCNC: 9.1 MG/DL — SIGNIFICANT CHANGE UP (ref 8.5–10.1)
CHLORIDE SERPL-SCNC: 105 MMOL/L — SIGNIFICANT CHANGE UP (ref 98–110)
CO2 SERPL-SCNC: 24 MMOL/L — SIGNIFICANT CHANGE UP (ref 17–32)
COLOR SPEC: YELLOW — SIGNIFICANT CHANGE UP
CREAT SERPL-MCNC: 0.7 MG/DL — SIGNIFICANT CHANGE UP (ref 0.7–1.5)
DIFF PNL FLD: NEGATIVE — SIGNIFICANT CHANGE UP
EOSINOPHIL # BLD AUTO: 0.18 K/UL — SIGNIFICANT CHANGE UP (ref 0–0.7)
EOSINOPHIL NFR BLD AUTO: 2.3 % — SIGNIFICANT CHANGE UP (ref 0–8)
GLUCOSE SERPL-MCNC: 101 MG/DL — HIGH (ref 70–99)
GLUCOSE UR QL: NEGATIVE MG/DL — SIGNIFICANT CHANGE UP
HCG SERPL QL: NEGATIVE — SIGNIFICANT CHANGE UP
HCT VFR BLD CALC: 42.3 % — SIGNIFICANT CHANGE UP (ref 37–47)
HGB BLD-MCNC: 14.4 G/DL — SIGNIFICANT CHANGE UP (ref 12–16)
IMM GRANULOCYTES NFR BLD AUTO: 0.1 % — SIGNIFICANT CHANGE UP (ref 0.1–0.3)
KETONES UR-MCNC: NEGATIVE — SIGNIFICANT CHANGE UP
LACTATE SERPL-SCNC: 1 MMOL/L — SIGNIFICANT CHANGE UP (ref 0.7–2)
LEUKOCYTE ESTERASE UR-ACNC: NEGATIVE — SIGNIFICANT CHANGE UP
LIDOCAIN IGE QN: 42 U/L — SIGNIFICANT CHANGE UP (ref 7–60)
LYMPHOCYTES # BLD AUTO: 1.93 K/UL — SIGNIFICANT CHANGE UP (ref 1.2–3.4)
LYMPHOCYTES # BLD AUTO: 24.3 % — SIGNIFICANT CHANGE UP (ref 20.5–51.1)
MCHC RBC-ENTMCNC: 29.3 PG — SIGNIFICANT CHANGE UP (ref 27–31)
MCHC RBC-ENTMCNC: 34 G/DL — SIGNIFICANT CHANGE UP (ref 32–37)
MCV RBC AUTO: 86 FL — SIGNIFICANT CHANGE UP (ref 81–99)
MONOCYTES # BLD AUTO: 0.66 K/UL — HIGH (ref 0.1–0.6)
MONOCYTES NFR BLD AUTO: 8.3 % — SIGNIFICANT CHANGE UP (ref 1.7–9.3)
NEUTROPHILS # BLD AUTO: 5.12 K/UL — SIGNIFICANT CHANGE UP (ref 1.4–6.5)
NEUTROPHILS NFR BLD AUTO: 64.5 % — SIGNIFICANT CHANGE UP (ref 42.2–75.2)
NITRITE UR-MCNC: NEGATIVE — SIGNIFICANT CHANGE UP
NRBC # BLD: 0 /100 WBCS — SIGNIFICANT CHANGE UP (ref 0–0)
PH UR: 5.5 — SIGNIFICANT CHANGE UP (ref 5–8)
PLATELET # BLD AUTO: 245 K/UL — SIGNIFICANT CHANGE UP (ref 130–400)
POTASSIUM SERPL-MCNC: 4.2 MMOL/L — SIGNIFICANT CHANGE UP (ref 3.5–5)
POTASSIUM SERPL-SCNC: 4.2 MMOL/L — SIGNIFICANT CHANGE UP (ref 3.5–5)
PROT SERPL-MCNC: 6.9 G/DL — SIGNIFICANT CHANGE UP (ref 6–8)
PROT UR-MCNC: NEGATIVE MG/DL — SIGNIFICANT CHANGE UP
RBC # BLD: 4.92 M/UL — SIGNIFICANT CHANGE UP (ref 4.2–5.4)
RBC # FLD: 11.4 % — LOW (ref 11.5–14.5)
SODIUM SERPL-SCNC: 139 MMOL/L — SIGNIFICANT CHANGE UP (ref 135–146)
SP GR SPEC: 1.02 — SIGNIFICANT CHANGE UP (ref 1.01–1.03)
UROBILINOGEN FLD QL: 0.2 MG/DL — SIGNIFICANT CHANGE UP
WBC # BLD: 7.94 K/UL — SIGNIFICANT CHANGE UP (ref 4.8–10.8)
WBC # FLD AUTO: 7.94 K/UL — SIGNIFICANT CHANGE UP (ref 4.8–10.8)

## 2021-09-18 PROCEDURE — 93010 ELECTROCARDIOGRAM REPORT: CPT

## 2021-09-18 PROCEDURE — 99285 EMERGENCY DEPT VISIT HI MDM: CPT

## 2021-09-18 PROCEDURE — 74177 CT ABD & PELVIS W/CONTRAST: CPT | Mod: 26,MG

## 2021-09-18 PROCEDURE — G1004: CPT

## 2021-09-18 RX ORDER — FAMOTIDINE 10 MG/ML
20 INJECTION INTRAVENOUS ONCE
Refills: 0 | Status: COMPLETED | OUTPATIENT
Start: 2021-09-18 | End: 2021-09-18

## 2021-09-18 RX ORDER — METOCLOPRAMIDE HCL 10 MG
10 TABLET ORAL ONCE
Refills: 0 | Status: COMPLETED | OUTPATIENT
Start: 2021-09-18 | End: 2021-09-18

## 2021-09-18 RX ORDER — IOHEXOL 300 MG/ML
30 INJECTION, SOLUTION INTRAVENOUS ONCE
Refills: 0 | Status: COMPLETED | OUTPATIENT
Start: 2021-09-18 | End: 2021-09-18

## 2021-09-18 RX ORDER — SODIUM CHLORIDE 9 MG/ML
1000 INJECTION INTRAMUSCULAR; INTRAVENOUS; SUBCUTANEOUS ONCE
Refills: 0 | Status: COMPLETED | OUTPATIENT
Start: 2021-09-18 | End: 2021-09-18

## 2021-09-18 RX ADMIN — FAMOTIDINE 20 MILLIGRAM(S): 10 INJECTION INTRAVENOUS at 16:39

## 2021-09-18 RX ADMIN — Medication 10 MILLIGRAM(S): at 16:39

## 2021-09-18 RX ADMIN — SODIUM CHLORIDE 1000 MILLILITER(S): 9 INJECTION INTRAMUSCULAR; INTRAVENOUS; SUBCUTANEOUS at 16:39

## 2021-09-18 RX ADMIN — IOHEXOL 30 MILLILITER(S): 300 INJECTION, SOLUTION INTRAVENOUS at 16:39

## 2021-09-18 NOTE — ED PROVIDER NOTE - OBJECTIVE STATEMENT
Pt is a 25y/o female with a pmhx of IBS, Mood disorder, PTSD presents today for eval of diffuse abd discomfort with associated loos bowel movements x 2 weeks that has been progressively worsening with prompted visit. Pt denies fever, chills, weakness, numbness, CP, SOB, Dysuria, hematuria

## 2021-09-18 NOTE — ED PROVIDER NOTE - ATTENDING CONTRIBUTION TO CARE
27 yo F pmh of IBS c presents with a flare. States that for the last 2 weeks she has been having a flare which worsened today. While at work she almost had an accident. Reports worsening diarrhea, no blood. no n/v. + diffuse abdominal cramping pain. no fevers. Follow with GI, last scope was 4 months ago.     CONSTITUTIONAL: Well-developed; well-nourished; in no acute distress.   SKIN: warm, dry  HEAD: Normocephalic; atraumatic.  EYES: PERRL, EOMI, no conjunctival erythema  ENT: No nasal discharge; airway clear.  NECK: Supple; non tender.  CARD: S1, S2 normal;  Regular rate and rhythm.   RESP: No wheezes, rales or rhonchi.  ABD: soft + LLQ tenderness, non distended, no rebound or guarding  EXT: Normal ROM.  5/5 strength in all 4 extremities   LYMPH: No acute cervical adenopathy.  NEURO: Alert, oriented, grossly unremarkable. neurovascularly intact  PSYCH: Cooperative, appropriate.

## 2021-09-18 NOTE — ED PROVIDER NOTE - NS ED ROS FT
Eyes:  No visual changes, eye pain or discharge.  ENMT:  No hearing changes, pain, discharge or infections. No neck pain or stiffness.  Cardiac:  No chest pain, SOB or edema. No chest pain with exertion.  Respiratory:  No cough or respiratory distress. No hemoptysis. No history of asthma or RAD.  GI:  + abd pain + diarrhea No nausea, vomiting,  :  No dysuria, frequency or burning.  MS:  No myalgia, muscle weakness, joint pain or back pain.  Neuro:  No headache or weakness.  No LOC.  Skin:  No skin rash.   Endocrine: No history of thyroid disease or diabetes.  Except as documented in the HPI,  all other systems are negative.

## 2021-09-18 NOTE — ED ADULT NURSE NOTE - NSICDXPASTMEDICALHX_GEN_ALL_CORE_FT
PAST MEDICAL HISTORY:  Borderline personality disorder     Depression     Irritable bowel syndrome (IBS)     Post traumatic stress disorder (PTSD)      PAST MEDICAL HISTORY:  Anorexia     Borderline personality disorder     Depression     Irritable bowel syndrome (IBS)     Post traumatic stress disorder (PTSD)

## 2021-09-18 NOTE — ED PROVIDER NOTE - PATIENT PORTAL LINK FT
You can access the FollowMyHealth Patient Portal offered by [facility] by registering at the following website: [email or web address]. By joining [employer]’s FollowMyHealth portal, you will also be able to view your health information using other applications (apps) compatible with our system.

## 2021-09-18 NOTE — ED PROVIDER NOTE - CLINICAL SUMMARY MEDICAL DECISION MAKING FREE TEXT BOX
Patient presents with abdominal pain and diarrhea. labs, ua, ct done. no acute findings. Discharged with pmd and GI follow up. Return precautions discussed.

## 2021-09-19 LAB
CULTURE RESULTS: SIGNIFICANT CHANGE UP
SPECIMEN SOURCE: SIGNIFICANT CHANGE UP

## 2021-09-28 ENCOUNTER — APPOINTMENT (OUTPATIENT)
Dept: PSYCHIATRY | Facility: CLINIC | Age: 26
End: 2021-09-28

## 2021-09-28 ENCOUNTER — OUTPATIENT (OUTPATIENT)
Dept: OUTPATIENT SERVICES | Facility: HOSPITAL | Age: 26
LOS: 1 days | Discharge: HOME | End: 2021-09-28

## 2021-09-28 DIAGNOSIS — F32.9 MAJOR DEPRESSIVE DISORDER, SINGLE EPISODE, UNSPECIFIED: ICD-10-CM

## 2021-09-28 DIAGNOSIS — F43.10 POST-TRAUMATIC STRESS DISORDER, UNSPECIFIED: ICD-10-CM

## 2021-09-28 DIAGNOSIS — F60.3 BORDERLINE PERSONALITY DISORDER: ICD-10-CM

## 2021-09-28 RX ORDER — VENLAFAXINE HYDROCHLORIDE 150 MG/1
150 TABLET, EXTENDED RELEASE ORAL DAILY
Qty: 30 | Refills: 0 | Status: DISCONTINUED | COMMUNITY
Start: 2021-09-15 | End: 2021-09-28

## 2021-09-29 PROBLEM — K58.9 IRRITABLE BOWEL SYNDROME WITHOUT DIARRHEA: Chronic | Status: ACTIVE | Noted: 2021-09-18

## 2021-09-29 PROBLEM — F43.10 POST-TRAUMATIC STRESS DISORDER, UNSPECIFIED: Chronic | Status: ACTIVE | Noted: 2021-09-18

## 2021-09-29 PROBLEM — R63.0 ANOREXIA: Chronic | Status: ACTIVE | Noted: 2021-09-18

## 2021-09-29 PROBLEM — F60.3 BORDERLINE PERSONALITY DISORDER: Chronic | Status: ACTIVE | Noted: 2021-09-18

## 2021-09-29 PROBLEM — F32.9 MAJOR DEPRESSIVE DISORDER, SINGLE EPISODE, UNSPECIFIED: Chronic | Status: ACTIVE | Noted: 2021-09-18

## 2021-09-29 PROBLEM — K58.9 IRRITABLE BOWEL SYNDROME, UNSPECIFIED: Chronic | Status: ACTIVE | Noted: 2021-09-18

## 2021-10-12 ENCOUNTER — APPOINTMENT (OUTPATIENT)
Dept: PSYCHIATRY | Facility: CLINIC | Age: 26
End: 2021-10-12

## 2021-10-12 ENCOUNTER — OUTPATIENT (OUTPATIENT)
Dept: OUTPATIENT SERVICES | Facility: HOSPITAL | Age: 26
LOS: 1 days | Discharge: HOME | End: 2021-10-12

## 2021-10-12 DIAGNOSIS — F43.10 POST-TRAUMATIC STRESS DISORDER, UNSPECIFIED: ICD-10-CM

## 2021-10-12 DIAGNOSIS — F60.3 BORDERLINE PERSONALITY DISORDER: ICD-10-CM

## 2021-10-12 DIAGNOSIS — F32.A DEPRESSION, UNSPECIFIED: ICD-10-CM

## 2021-10-26 ENCOUNTER — APPOINTMENT (OUTPATIENT)
Dept: PSYCHIATRY | Facility: CLINIC | Age: 26
End: 2021-10-26

## 2021-11-03 ENCOUNTER — TRANSCRIPTION ENCOUNTER (OUTPATIENT)
Age: 26
End: 2021-11-03

## 2021-11-09 ENCOUNTER — APPOINTMENT (OUTPATIENT)
Dept: PSYCHIATRY | Facility: CLINIC | Age: 26
End: 2021-11-09

## 2021-11-09 ENCOUNTER — OUTPATIENT (OUTPATIENT)
Dept: OUTPATIENT SERVICES | Facility: HOSPITAL | Age: 26
LOS: 1 days | Discharge: HOME | End: 2021-11-09

## 2021-11-09 DIAGNOSIS — F60.3 BORDERLINE PERSONALITY DISORDER: ICD-10-CM

## 2021-11-09 DIAGNOSIS — F43.10 POST-TRAUMATIC STRESS DISORDER, UNSPECIFIED: ICD-10-CM

## 2021-11-30 ENCOUNTER — APPOINTMENT (OUTPATIENT)
Dept: PSYCHIATRY | Facility: CLINIC | Age: 26
End: 2021-11-30

## 2021-12-27 RX ORDER — VENLAFAXINE HYDROCHLORIDE 150 MG/1
150 CAPSULE, EXTENDED RELEASE ORAL DAILY
Qty: 14 | Refills: 0 | Status: COMPLETED | COMMUNITY
Start: 2021-09-28 | End: 2021-12-27

## 2022-01-18 ENCOUNTER — TRANSCRIPTION ENCOUNTER (OUTPATIENT)
Age: 27
End: 2022-01-18

## 2022-01-24 ENCOUNTER — APPOINTMENT (OUTPATIENT)
Dept: PSYCHIATRY | Facility: CLINIC | Age: 27
End: 2022-01-24

## 2022-01-24 ENCOUNTER — OUTPATIENT (OUTPATIENT)
Dept: OUTPATIENT SERVICES | Facility: HOSPITAL | Age: 27
LOS: 1 days | Discharge: HOME | End: 2022-01-24

## 2022-01-24 DIAGNOSIS — F43.10 POST-TRAUMATIC STRESS DISORDER, UNSPECIFIED: ICD-10-CM

## 2022-01-24 DIAGNOSIS — F60.3 BORDERLINE PERSONALITY DISORDER: ICD-10-CM

## 2022-02-17 ENCOUNTER — NON-APPOINTMENT (OUTPATIENT)
Age: 27
End: 2022-02-17

## 2022-02-22 ENCOUNTER — APPOINTMENT (OUTPATIENT)
Dept: PSYCHIATRY | Facility: CLINIC | Age: 27
End: 2022-02-22

## 2022-02-23 ENCOUNTER — APPOINTMENT (OUTPATIENT)
Dept: PSYCHIATRY | Facility: CLINIC | Age: 27
End: 2022-02-23

## 2022-02-23 ENCOUNTER — OUTPATIENT (OUTPATIENT)
Dept: OUTPATIENT SERVICES | Facility: HOSPITAL | Age: 27
LOS: 1 days | Discharge: HOME | End: 2022-02-23

## 2022-02-23 DIAGNOSIS — F60.3 BORDERLINE PERSONALITY DISORDER: ICD-10-CM

## 2022-02-23 DIAGNOSIS — F43.10 POST-TRAUMATIC STRESS DISORDER, UNSPECIFIED: ICD-10-CM

## 2022-02-23 DIAGNOSIS — F32.A DEPRESSION, UNSPECIFIED: ICD-10-CM

## 2022-02-25 ENCOUNTER — APPOINTMENT (OUTPATIENT)
Dept: PSYCHIATRY | Facility: CLINIC | Age: 27
End: 2022-02-25

## 2022-02-25 ENCOUNTER — OUTPATIENT (OUTPATIENT)
Dept: OUTPATIENT SERVICES | Facility: HOSPITAL | Age: 27
LOS: 1 days | Discharge: HOME | End: 2022-02-25

## 2022-02-25 DIAGNOSIS — F60.3 BORDERLINE PERSONALITY DISORDER: ICD-10-CM

## 2022-02-25 DIAGNOSIS — F43.10 POST-TRAUMATIC STRESS DISORDER, UNSPECIFIED: ICD-10-CM

## 2022-02-25 DIAGNOSIS — F32.A DEPRESSION, UNSPECIFIED: ICD-10-CM

## 2022-03-08 ENCOUNTER — APPOINTMENT (OUTPATIENT)
Dept: PSYCHIATRY | Facility: CLINIC | Age: 27
End: 2022-03-08

## 2022-03-08 ENCOUNTER — OUTPATIENT (OUTPATIENT)
Dept: OUTPATIENT SERVICES | Facility: HOSPITAL | Age: 27
LOS: 1 days | Discharge: HOME | End: 2022-03-08

## 2022-03-08 DIAGNOSIS — F60.3 BORDERLINE PERSONALITY DISORDER: ICD-10-CM

## 2022-03-08 DIAGNOSIS — F32.A DEPRESSION, UNSPECIFIED: ICD-10-CM

## 2022-03-08 DIAGNOSIS — F43.10 POST-TRAUMATIC STRESS DISORDER, UNSPECIFIED: ICD-10-CM

## 2022-03-11 ENCOUNTER — OUTPATIENT (OUTPATIENT)
Dept: OUTPATIENT SERVICES | Facility: HOSPITAL | Age: 27
LOS: 1 days | Discharge: HOME | End: 2022-03-11

## 2022-03-11 ENCOUNTER — APPOINTMENT (OUTPATIENT)
Dept: PSYCHIATRY | Facility: CLINIC | Age: 27
End: 2022-03-11

## 2022-03-11 DIAGNOSIS — F43.10 POST-TRAUMATIC STRESS DISORDER, UNSPECIFIED: ICD-10-CM

## 2022-03-11 DIAGNOSIS — F60.3 BORDERLINE PERSONALITY DISORDER: ICD-10-CM

## 2022-03-11 DIAGNOSIS — F32.A DEPRESSION, UNSPECIFIED: ICD-10-CM

## 2022-03-22 ENCOUNTER — APPOINTMENT (OUTPATIENT)
Dept: PSYCHIATRY | Facility: CLINIC | Age: 27
End: 2022-03-22

## 2022-03-22 ENCOUNTER — OUTPATIENT (OUTPATIENT)
Dept: OUTPATIENT SERVICES | Facility: HOSPITAL | Age: 27
LOS: 1 days | Discharge: HOME | End: 2022-03-22

## 2022-03-22 DIAGNOSIS — F43.10 POST-TRAUMATIC STRESS DISORDER, UNSPECIFIED: ICD-10-CM

## 2022-03-22 DIAGNOSIS — F60.3 BORDERLINE PERSONALITY DISORDER: ICD-10-CM

## 2022-03-22 DIAGNOSIS — F32.A DEPRESSION, UNSPECIFIED: ICD-10-CM

## 2022-03-25 ENCOUNTER — APPOINTMENT (OUTPATIENT)
Dept: PSYCHIATRY | Facility: CLINIC | Age: 27
End: 2022-03-25

## 2022-04-04 ENCOUNTER — EMERGENCY (EMERGENCY)
Facility: HOSPITAL | Age: 27
LOS: 0 days | Discharge: HOME | End: 2022-04-04
Attending: EMERGENCY MEDICINE | Admitting: EMERGENCY MEDICINE
Payer: MEDICAID

## 2022-04-04 VITALS
RESPIRATION RATE: 18 BRPM | HEIGHT: 59 IN | WEIGHT: 160.06 LBS | OXYGEN SATURATION: 97 % | TEMPERATURE: 98 F | HEART RATE: 100 BPM | SYSTOLIC BLOOD PRESSURE: 126 MMHG | DIASTOLIC BLOOD PRESSURE: 81 MMHG

## 2022-04-04 DIAGNOSIS — F31.9 BIPOLAR DISORDER, UNSPECIFIED: ICD-10-CM

## 2022-04-04 DIAGNOSIS — R11.0 NAUSEA: ICD-10-CM

## 2022-04-04 DIAGNOSIS — Z88.0 ALLERGY STATUS TO PENICILLIN: ICD-10-CM

## 2022-04-04 DIAGNOSIS — Z91.018 ALLERGY TO OTHER FOODS: ICD-10-CM

## 2022-04-04 DIAGNOSIS — R10.30 LOWER ABDOMINAL PAIN, UNSPECIFIED: ICD-10-CM

## 2022-04-04 DIAGNOSIS — Z88.8 ALLERGY STATUS TO OTHER DRUGS, MEDICAMENTS AND BIOLOGICAL SUBSTANCES STATUS: ICD-10-CM

## 2022-04-04 DIAGNOSIS — R19.7 DIARRHEA, UNSPECIFIED: ICD-10-CM

## 2022-04-04 DIAGNOSIS — Z88.1 ALLERGY STATUS TO OTHER ANTIBIOTIC AGENTS STATUS: ICD-10-CM

## 2022-04-04 DIAGNOSIS — Z87.19 PERSONAL HISTORY OF OTHER DISEASES OF THE DIGESTIVE SYSTEM: ICD-10-CM

## 2022-04-04 LAB
ALBUMIN SERPL ELPH-MCNC: 5 G/DL — SIGNIFICANT CHANGE UP (ref 3.5–5.2)
ALP SERPL-CCNC: 104 U/L — SIGNIFICANT CHANGE UP (ref 30–115)
ALT FLD-CCNC: 17 U/L — SIGNIFICANT CHANGE UP (ref 0–41)
ANION GAP SERPL CALC-SCNC: 12 MMOL/L — SIGNIFICANT CHANGE UP (ref 7–14)
AST SERPL-CCNC: 17 U/L — SIGNIFICANT CHANGE UP (ref 0–41)
BASOPHILS # BLD AUTO: 0.07 K/UL — SIGNIFICANT CHANGE UP (ref 0–0.2)
BASOPHILS NFR BLD AUTO: 0.6 % — SIGNIFICANT CHANGE UP (ref 0–1)
BILIRUB SERPL-MCNC: 0.3 MG/DL — SIGNIFICANT CHANGE UP (ref 0.2–1.2)
BUN SERPL-MCNC: 9 MG/DL — LOW (ref 10–20)
CALCIUM SERPL-MCNC: 9.7 MG/DL — SIGNIFICANT CHANGE UP (ref 8.5–10.1)
CHLORIDE SERPL-SCNC: 102 MMOL/L — SIGNIFICANT CHANGE UP (ref 98–110)
CO2 SERPL-SCNC: 27 MMOL/L — SIGNIFICANT CHANGE UP (ref 17–32)
CREAT SERPL-MCNC: 0.8 MG/DL — SIGNIFICANT CHANGE UP (ref 0.7–1.5)
EGFR: 103 ML/MIN/1.73M2 — SIGNIFICANT CHANGE UP
EOSINOPHIL # BLD AUTO: 0.54 K/UL — SIGNIFICANT CHANGE UP (ref 0–0.7)
EOSINOPHIL NFR BLD AUTO: 4.9 % — SIGNIFICANT CHANGE UP (ref 0–8)
GLUCOSE SERPL-MCNC: 93 MG/DL — SIGNIFICANT CHANGE UP (ref 70–99)
HCT VFR BLD CALC: 45.4 % — SIGNIFICANT CHANGE UP (ref 37–47)
HGB BLD-MCNC: 15.2 G/DL — SIGNIFICANT CHANGE UP (ref 12–16)
IMM GRANULOCYTES NFR BLD AUTO: 0.4 % — HIGH (ref 0.1–0.3)
LACTATE SERPL-SCNC: 2 MMOL/L — SIGNIFICANT CHANGE UP (ref 0.7–2)
LIDOCAIN IGE QN: 46 U/L — SIGNIFICANT CHANGE UP (ref 7–60)
LYMPHOCYTES # BLD AUTO: 19.6 % — LOW (ref 20.5–51.1)
LYMPHOCYTES # BLD AUTO: 2.18 K/UL — SIGNIFICANT CHANGE UP (ref 1.2–3.4)
MCHC RBC-ENTMCNC: 28.6 PG — SIGNIFICANT CHANGE UP (ref 27–31)
MCHC RBC-ENTMCNC: 33.5 G/DL — SIGNIFICANT CHANGE UP (ref 32–37)
MCV RBC AUTO: 85.3 FL — SIGNIFICANT CHANGE UP (ref 81–99)
MONOCYTES # BLD AUTO: 0.71 K/UL — HIGH (ref 0.1–0.6)
MONOCYTES NFR BLD AUTO: 6.4 % — SIGNIFICANT CHANGE UP (ref 1.7–9.3)
NEUTROPHILS # BLD AUTO: 7.59 K/UL — HIGH (ref 1.4–6.5)
NEUTROPHILS NFR BLD AUTO: 68.1 % — SIGNIFICANT CHANGE UP (ref 42.2–75.2)
NRBC # BLD: 0 /100 WBCS — SIGNIFICANT CHANGE UP (ref 0–0)
PLATELET # BLD AUTO: 332 K/UL — SIGNIFICANT CHANGE UP (ref 130–400)
POTASSIUM SERPL-MCNC: 4.2 MMOL/L — SIGNIFICANT CHANGE UP (ref 3.5–5)
POTASSIUM SERPL-SCNC: 4.2 MMOL/L — SIGNIFICANT CHANGE UP (ref 3.5–5)
PROT SERPL-MCNC: 7.9 G/DL — SIGNIFICANT CHANGE UP (ref 6–8)
RBC # BLD: 5.32 M/UL — SIGNIFICANT CHANGE UP (ref 4.2–5.4)
RBC # FLD: 12.5 % — SIGNIFICANT CHANGE UP (ref 11.5–14.5)
SODIUM SERPL-SCNC: 141 MMOL/L — SIGNIFICANT CHANGE UP (ref 135–146)
WBC # BLD: 11.13 K/UL — HIGH (ref 4.8–10.8)
WBC # FLD AUTO: 11.13 K/UL — HIGH (ref 4.8–10.8)

## 2022-04-04 PROCEDURE — 99284 EMERGENCY DEPT VISIT MOD MDM: CPT

## 2022-04-04 RX ORDER — ONDANSETRON 8 MG/1
4 TABLET, FILM COATED ORAL ONCE
Refills: 0 | Status: COMPLETED | OUTPATIENT
Start: 2022-04-04 | End: 2022-04-04

## 2022-04-04 RX ORDER — SODIUM CHLORIDE 9 MG/ML
1000 INJECTION, SOLUTION INTRAVENOUS ONCE
Refills: 0 | Status: COMPLETED | OUTPATIENT
Start: 2022-04-04 | End: 2022-04-04

## 2022-04-04 RX ADMIN — Medication 20 MILLIGRAM(S): at 16:48

## 2022-04-04 RX ADMIN — SODIUM CHLORIDE 1000 MILLILITER(S): 9 INJECTION, SOLUTION INTRAVENOUS at 16:48

## 2022-04-04 RX ADMIN — ONDANSETRON 4 MILLIGRAM(S): 8 TABLET, FILM COATED ORAL at 16:48

## 2022-04-04 NOTE — ED PROVIDER NOTE - NS ED ROS FT
Review of Systems:  	•	CONSTITUTIONAL: no fever  	•	SKIN: no rash  	•	EYES: no eye pain, no blurry vision  	•	ENT: no sore throat   	•	RESPIRATORY: no shortness of breath, no cough  	•	CARDIAC: no chest pain, no palpitations  	•	GI: +abd pain, +nausea, no vomiting, no diarrhea, no constipation  	•	GENITO-URINARY: no discharge, no dysuria; no hematuria, no increased urinary frequency  	•	MUSCULOSKELETAL: no joint paint, no swelling, no redness  	•	NEUROLOGIC: no weakness, no headache   	•	PSYCH: no anxiety, non suicidal, non homicidal, no hallucination, no depression

## 2022-04-04 NOTE — ED PROVIDER NOTE - PHYSICAL EXAMINATION
CONSTITUTIONAL: Well-developed; well-nourished; in no acute distress, nontoxic appearing  SKIN: skin exam is warm and dry  ENT: MMM  CARD: S1, S2 normal, no murmur  RESP: No wheezes, rales or rhonchi. Good air movement bilaterally  ABD: soft; non-distended; non-tender. No Rebound, No guarding  EXT: Normal ROM.   NEURO: awake, alert, following commands, oriented, grossly unremarkable. No Focal deficits. GCS 15.   PSYCH: Cooperative, appropriate.

## 2022-04-04 NOTE — ED PROVIDER NOTE - OBJECTIVE STATEMENT
26 year old female, past medical history IBS, bipolar disorder, who presents with lower abd pain. patient endorses lower abd pain described as cramping, intermittent, non-radiating, with associated loose stool and nausea. denies f/c, chest pain, shortness of breath, back pain, urinary symptoms, vaginal bleeding/discharge. no hx abd surgeries.   hx endoscopy/colonoscopy ~6months unremarkable.

## 2022-04-04 NOTE — ED PROVIDER NOTE - ATTENDING CONTRIBUTION TO CARE
20 y/o female h/o IBS (? inflamm vs irritable) not currently on medication, denies PSH p/w lower abdominal pain x several days, cramping, denies radiation, worse with food, + nausea and loose stools denies fever, vomiting, anorexia, cough, respiratory sx, urinary sx, vaginal d/c or other associated complaints at present.     Old chart reviewed.  I have reviewed and agree with the initial nursing note, except as documented in my note.    VSS, awake, alert, non-toxic appearing, no scleral icterus, oropharynx clear, mmm, no jaundice, skin rash or lesions, chest CTAB, non-labored breathing, no w/r/r, +S1/S2, RRR, no m/r/g, abdomen soft, mild diffuse ttp w/o peritoneal signs, +BS, no hernias or distention, no pulsatile masses or bruits appreciated, no CVA tenderness, no peripheral edema or deformities, alert, clear speech and steady gait. 27 y/o female h/o IBS (? inflamm vs irritable) not currently on medication, denies PSH p/w lower abdominal pain x several days, cramping, denies radiation, worse with food, + nausea and loose stools denies fever, vomiting, anorexia, cough, respiratory sx, urinary sx, vaginal d/c or other associated complaints at present.     Old chart reviewed.  I have reviewed and agree with the initial nursing note, except as documented in my note.    VSS, awake, alert, non-toxic appearing, no scleral icterus, oropharynx clear, mmm, no jaundice, skin rash or lesions, chest CTAB, non-labored breathing, no w/r/r, +S1/S2, RRR, no m/r/g, abdomen soft, mild diffuse ttp w/o peritoneal signs, +BS, no hernias or distention, no pulsatile masses or bruits appreciated, no CVA tenderness, no peripheral edema or deformities, alert, clear speech and steady gait.

## 2022-04-04 NOTE — ED PROVIDER NOTE - PROGRESS NOTE DETAILS
patient reports significant improvement of symptoms after meds. rpt abd exam soft/non-tender/non-distended.

## 2022-04-04 NOTE — ED PROVIDER NOTE - NSFOLLOWUPINSTRUCTIONS_ED_ALL_ED_FT
Abdominal Pain, Adult     Many things can cause belly (abdominal) pain. Most times, belly pain is not dangerous. Many cases of belly pain can be watched and treated at home. Sometimes belly pain is serious, though. Your doctor will try to find the cause of your belly pain.  Follow these instructions at home:  Take over-the-counter and prescription medicines only as told by your doctor. Do not take medicines that help you poop (laxatives) unless told to by your doctor.Drink enough fluid to keep your pee (urine) clear or pale yellow.Watch your belly pain for any changes.Keep all follow-up visits as told by your doctor. This is important.Contact a doctor if:  Your belly pain changes or gets worse.You are not hungry, or you lose weight without trying.You are having trouble pooping (constipated) or have watery poop (diarrhea) for more than 2–3 days.You have pain when you pee or poop.Your belly pain wakes you up at night.Your pain gets worse with meals, after eating, or with certain foods.You are throwing up and cannot keep anything down.You have a fever.Get help right away if:  Your pain does not go away as soon as your doctor says it should.You cannot stop throwing up.Your pain is only in areas of your belly, such as the right side or the left lower part of the belly.You have bloody or black poop, or poop that looks like tar.You have very bad pain, cramping, or bloating in your belly.You have signs of not having enough fluid or water in your body (dehydration), such as:  Dark pee, very little pee, or no pee.Cracked lips.Dry mouth.Sunken eyes.Sleepiness.Weakness.This information is not intended to replace advice given to you by your health care provider. Make sure you discuss any questions you have with your health care provider.    Document Released: 06/05/2009 Document Revised: 07/07/2017 Document Reviewed: 05/31/2017  [employer] Patient Education © 2019 [employer]
Posterior

## 2022-04-04 NOTE — ED PROVIDER NOTE - PROVIDER TOKENS
PROVIDER:[TOKEN:[[id number]],FOLLOWUP:[4-6 Days]],PROVIDER:[TOKEN:[[id number]],FOLLOWUP:[4-6 Days]]

## 2022-04-04 NOTE — ED PROVIDER NOTE - NSICDXPASTMEDICALHX_GEN_ALL_CORE_FT
PAST MEDICAL HISTORY:  Anorexia     Borderline personality disorder     Depression     Irritable bowel syndrome (IBS)     Post traumatic stress disorder (PTSD)

## 2022-04-04 NOTE — ED PROVIDER NOTE - CARE PROVIDER_API CALL
Shahid Sotelo (DO)  Gastroenterology  [address] [place] [address]  Phone: [phone]  Fax: [phone]  Follow Up Time: 4-6 Days    Whit Zaman (MD)  Gastroenterology  [address] [place] [id number]  Phone: [phone]  Fax: [phone]  Follow Up Time: 4-6 Days

## 2022-04-04 NOTE — ED PROVIDER NOTE - PATIENT PORTAL LINK FT
You can access the FollowMyHealth Patient Portal offered by [facility] by registering at the following website: [email or web address]. By joining [employer]’s FollowMyHealth portal, you will also be able to view your health information using other applications (apps) compatible with our system.

## 2022-04-04 NOTE — ED PROVIDER NOTE - NS ED ATTENDING STATEMENT MOD
This was a shared visit with the [facility]. I reviewed and verified the documentation and independently performed the documented:

## 2022-04-04 NOTE — ED PROVIDER NOTE - CLINICAL SUMMARY MEDICAL DECISION MAKING FREE TEXT BOX
From: Melida Aly  To: Crystal Black PA-C  Sent: 6/24/2021 11:34 AM CDT  Subject: Referral Request    Dr. Zach Altamirano:  February I visited and complained of a soreness in left foot outside area. Well after it went away and walking since.  But you had
I have fully discussed the medical management and delivery of care with the patient / guardian. I have discussed any available labs, imaging and treatment options with the patient / guardian and any diagnostic results supporting the patient's diagnosis. Please see progress notes, attending note and above notations for further mdm. Chart completed.

## 2022-04-08 ENCOUNTER — NON-APPOINTMENT (OUTPATIENT)
Age: 27
End: 2022-04-08

## 2022-04-20 ENCOUNTER — OUTPATIENT (OUTPATIENT)
Dept: OUTPATIENT SERVICES | Facility: HOSPITAL | Age: 27
LOS: 1 days | Discharge: HOME | End: 2022-04-20

## 2022-04-20 ENCOUNTER — APPOINTMENT (OUTPATIENT)
Dept: PSYCHIATRY | Facility: CLINIC | Age: 27
End: 2022-04-20

## 2022-04-20 DIAGNOSIS — F32.A DEPRESSION, UNSPECIFIED: ICD-10-CM

## 2022-04-20 DIAGNOSIS — F43.10 POST-TRAUMATIC STRESS DISORDER, UNSPECIFIED: ICD-10-CM

## 2022-04-20 DIAGNOSIS — F60.3 BORDERLINE PERSONALITY DISORDER: ICD-10-CM

## 2022-05-02 ENCOUNTER — NON-APPOINTMENT (OUTPATIENT)
Age: 27
End: 2022-05-02

## 2022-05-04 ENCOUNTER — OUTPATIENT (OUTPATIENT)
Dept: OUTPATIENT SERVICES | Facility: HOSPITAL | Age: 27
LOS: 1 days | Discharge: HOME | End: 2022-05-04

## 2022-05-04 ENCOUNTER — APPOINTMENT (OUTPATIENT)
Dept: PSYCHIATRY | Facility: CLINIC | Age: 27
End: 2022-05-04

## 2022-05-04 DIAGNOSIS — F60.3 BORDERLINE PERSONALITY DISORDER: ICD-10-CM

## 2022-05-04 DIAGNOSIS — F43.10 POST-TRAUMATIC STRESS DISORDER, UNSPECIFIED: ICD-10-CM

## 2022-05-04 DIAGNOSIS — F32.A DEPRESSION, UNSPECIFIED: ICD-10-CM

## 2022-05-10 ENCOUNTER — APPOINTMENT (OUTPATIENT)
Dept: PSYCHIATRY | Facility: CLINIC | Age: 27
End: 2022-05-10

## 2022-05-10 ENCOUNTER — OUTPATIENT (OUTPATIENT)
Dept: OUTPATIENT SERVICES | Facility: HOSPITAL | Age: 27
LOS: 1 days | Discharge: HOME | End: 2022-05-10

## 2022-05-10 DIAGNOSIS — F60.3 BORDERLINE PERSONALITY DISORDER: ICD-10-CM

## 2022-05-10 DIAGNOSIS — F43.10 POST-TRAUMATIC STRESS DISORDER, UNSPECIFIED: ICD-10-CM

## 2022-05-10 DIAGNOSIS — F32.A DEPRESSION, UNSPECIFIED: ICD-10-CM

## 2022-05-25 ENCOUNTER — APPOINTMENT (OUTPATIENT)
Dept: PSYCHIATRY | Facility: CLINIC | Age: 27
End: 2022-05-25

## 2022-05-25 ENCOUNTER — OUTPATIENT (OUTPATIENT)
Dept: OUTPATIENT SERVICES | Facility: HOSPITAL | Age: 27
LOS: 1 days | Discharge: HOME | End: 2022-05-25

## 2022-05-25 DIAGNOSIS — F32.A DEPRESSION, UNSPECIFIED: ICD-10-CM

## 2022-05-25 DIAGNOSIS — F43.10 POST-TRAUMATIC STRESS DISORDER, UNSPECIFIED: ICD-10-CM

## 2022-05-25 DIAGNOSIS — F60.3 BORDERLINE PERSONALITY DISORDER: ICD-10-CM

## 2022-06-04 ENCOUNTER — EMERGENCY (EMERGENCY)
Facility: HOSPITAL | Age: 27
LOS: 1 days | Discharge: HOME | End: 2022-06-04
Attending: EMERGENCY MEDICINE | Admitting: EMERGENCY MEDICINE
Payer: MEDICAID

## 2022-06-04 VITALS
RESPIRATION RATE: 16 BRPM | HEART RATE: 92 BPM | TEMPERATURE: 96 F | DIASTOLIC BLOOD PRESSURE: 97 MMHG | HEIGHT: 59 IN | SYSTOLIC BLOOD PRESSURE: 133 MMHG | OXYGEN SATURATION: 100 %

## 2022-06-04 PROCEDURE — 99283 EMERGENCY DEPT VISIT LOW MDM: CPT

## 2022-06-04 RX ORDER — AZITHROMYCIN 500 MG/1
1 TABLET, FILM COATED ORAL
Qty: 4 | Refills: 0
Start: 2022-06-04 | End: 2022-06-07

## 2022-06-04 RX ORDER — AZITHROMYCIN 500 MG/1
500 TABLET, FILM COATED ORAL ONCE
Refills: 0 | Status: COMPLETED | OUTPATIENT
Start: 2022-06-04 | End: 2022-06-04

## 2022-06-04 RX ORDER — OXYCODONE AND ACETAMINOPHEN 5; 325 MG/1; MG/1
1 TABLET ORAL ONCE
Refills: 0 | Status: DISCONTINUED | OUTPATIENT
Start: 2022-06-04 | End: 2022-06-04

## 2022-06-04 RX ORDER — OXYCODONE AND ACETAMINOPHEN 5; 325 MG/1; MG/1
1 TABLET ORAL
Qty: 12 | Refills: 0
Start: 2022-06-04 | End: 2022-06-06

## 2022-06-04 RX ADMIN — AZITHROMYCIN 500 MILLIGRAM(S): 500 TABLET, FILM COATED ORAL at 13:49

## 2022-06-04 RX ADMIN — OXYCODONE AND ACETAMINOPHEN 1 TABLET(S): 5; 325 TABLET ORAL at 13:49

## 2022-06-04 NOTE — ED PROVIDER NOTE - NSFOLLOWUPINSTRUCTIONS_ED_ALL_ED_FT
you need to follow with the dentist monday.  we will give you antibiotics and pain meds.    percocet 1-2 tablets every 6 hours, as needed, be careful with this medicine as it can be addictive    ibuprofen 400 mg every 6 hours    You are being discharged from the Emergency Department after evaluation of your presenting problem.  You were found not to have an emergency that requires hospitalization or surgery, but this does not mean you do not have a health concern.  You should follow up with your primary care physician and any other physicians suggested at time of discharge.  Also, if your condition worsens or changes know that the emergency department is open and available 24 hours a day/ 7 days a week and you should return to us if you have concerns. Thank you for allowing us to participate in your care.

## 2022-06-04 NOTE — ED PROVIDER NOTE - CLINICAL SUMMARY MEDICAL DECISION MAKING FREE TEXT BOX
antwon: my note above, pain meds and allergies to be given and pt to f/u with dental tomorrow antwon: my note above, pain meds and allergies to be given and pt to f/u with dental monday am early

## 2022-06-04 NOTE — ED PROVIDER NOTE - PATIENT PORTAL LINK FT
You can access the FollowMyHealth Patient Portal offered by [facility] by registering at the following website: [email or web address]. By joining [employer]’s FollowMyHealth portal, you will also be able to view your health information using other applications (apps) compatible with our system.

## 2022-06-04 NOTE — ED PROVIDER NOTE - PHYSICAL EXAMINATION
pt alert and can phonate well  h at/nc  perrl, conj clear, sclera anicteric,  neck supple  throat no exudate, no erythema no pus at gum line, tooth number #32 with macerated gum all around it  ext no edema no deformities  neueo awake, lucid normal gait moves all extremities with strength  psych appropriately interactive  vs reasonable

## 2022-06-04 NOTE — ED PROVIDER NOTE - OBJECTIVE STATEMENT
antwon: 27 yo with errupting wisdom tooth with pain.  has been trying to connect with  dentist but has had insurance problems.  has IBS so avoids nsaids, but took some today.  has been taking tylenol.  no fever, decreased eating and drinking.  also hx borderline personality disorder.      denies N/V/abd pain/ HA/ fever/ chest pain/ SOB/ extremity issue  on effexor, seraquel    pt can get to dental clinic monday am, but needs pain control in meantime.  allergic to pcn, clinda, but says she can take azithro

## 2022-06-04 NOTE — ED PROVIDER NOTE - NS ED MD DISPO DISCHARGE CCDA
Renal Progress Note    Patient :  Tomas Morgan; 60 y.o. MRN# [id number]  Location:  7411/2280-36  Attending:  Robson Shah MD  Admit Date:  10/17/2020   Hospital Day: 2      Subjective:     Patient was seen and examined. No new issues reported overnight. Patient normally gets dialysis as per TTS schedule. Current IV antibiotics as per infectious diseases. Outpatient Medications:     Medications Prior to Admission: doxycycline hyclate (VIBRAMYCIN) 100 MG capsule, Take 100 mg by mouth 2 times daily  methocarbamol (ROBAXIN) 750 MG tablet, Take 750 mg by mouth 3 times daily  ELIQUIS 5 MG TABS tablet, TAKE ONE TABLET BY MOUTH TWICE DAILY  losartan (COZAAR) 25 MG tablet, TAKE ONE TABLET BY MOUTH EVERY DAY  aspirin 81 MG chewable tablet, Take 81 mg by mouth daily  midodrine (PROAMATINE) 5 MG tablet, Take 10 mg by mouth as needed  HYDROcodone-acetaminophen (NORCO) 5-325 MG per tablet, Take 1 tablet by mouth every 6 hours as needed for Pain.   methylPREDNISolone (MEDROL) 4 MG tablet, Take 2 mg by mouth daily  lanthanum (FOSRENOL) 1000 MG chewable tablet, CHEW AND SWALLOW 1 TABLET THREE TIMES A DAY WITH MEALS  metoprolol succinate (TOPROL XL) 25 MG extended release tablet, TAKE ONE TABLET BY MOUTH EVERY DAY  oxyCODONE-acetaminophen (PERCOCET) 5-325 MG per tablet, TAKE ONE TABLET BY MOUTH EVERY 4 HOURS AS NEEDED  B Complex-C-Folic Acid (LOLA-GRACE) TABS, TAKE 1 TABLET BY MOUTH EVERY EVENING    Current Medications:     Scheduled Meds:    [START ON 10/20/2020] cinacalcet  60 mg Oral Once per day on Tue Thu Sat    midodrine  10 mg Oral TID WC    cefTRIAXone (ROCEPHIN) IV  2 g Intravenous Q24H    aspirin  81 mg Oral Daily    b complex-C-folic acid  1 mg Oral Daily    apixaban  5 mg Oral BID    methocarbamol  750 mg Oral TID    sodium chloride flush  10 mL Intravenous 2 times per day     Continuous Infusions:   PRN Meds:  midodrine, sodium chloride flush, acetaminophen **OR** acetaminophen, polyethylene glycol, promethazine **OR** ondansetron, nicotine    Input/Output:       I/O last 3 completed shifts: In: 12 [P.O.:660; I.V.:500]  Out: - .      Patient Vitals for the past 96 hrs (Last 3 readings):   Weight   10/19/20 0555 268 lb 4.8 oz (121.7 kg)   10/17/20 2228 267 lb 13.7 oz (121.5 kg)       Vital Signs:   Temperature:  Temp: 97.7 °F (36.5 °C)  TMax:   Temp (24hrs), Av.9 °F (37.2 °C), Min:96.4 °F (35.8 °C), Max:100.5 °F (38.1 °C)    Respirations:  Resp: 14  Pulse:   Pulse: 64  BP:    BP: 119/81  BP Range: Systolic (31GGW), DGM:31 , Min:69 , BDU:971       Diastolic (54RHD), PKM:82, Min:42, Max:81      Physical Examination:     General:  AAO x 3, speaking in full sentences, no accessory muscle use. HEENT: Atraumatic, normocephalic, no throat congestion, moist mucosa. Eyes:   Pupils equal, round and reactive to light, EOMI. Neck:   Supple  Chest:   Bilateral vesicular breath sounds, no rales or wheezes. Cardiac:  S1 S2 RR, no murmurs, gallops or rubs. Abdomen: Soft, non-tender, no masses or organomegaly, BS audible. :   No suprapubic or flank tenderness. Neuro:  AAO x 3, No FND. SKIN:  No rashes, good skin turgor. Extremities:  No edema. Labs:       Recent Labs     10/17/20  0536 10/18/20  0652   WBC 9.0 5.9   RBC 3.23* 2.96*   HGB 9.7* 8.9*   HCT 30.2* 28.6*   MCV 93.5 96.6   MCH 30.0 30.1   MCHC 32.1 31.1   RDW 14.1 14.3    152   MPV 11.4 11.3      BMP:   Recent Labs     10/17/20  0536 10/18/20  0652   * 132*   K 4.8 4.4   CL 92* 91*   CO2 27 27   BUN 38* 22*   CREATININE 11.64* 7.82*   GLUCOSE 93 78   CALCIUM 9.4 9.2      Albumin:    Recent Labs     10/17/20  0536   LABALBU 3.9     BNP:      Lab Results   Component Value Date    BNP NOT REPORTED 2014     SPEP:  Lab Results   Component Value Date    PROT 8.5 10/17/2020     Radiology:    Reviewed. Assessment:     1. ESRD on HD on TTS schedule at [facility] under Dr. Lei Benson via right arm AV fistula.    2.  Streptococcal group B septicemia on IV antibiotics per ID. 3.  Anemia of chronic disease. 4.  Coronary artery disease status post stent. 5.  Diabetes type 2.  6.  Essential hypertension. 7.  A. fib. 8.  Ischemic cardiomyopathy status post AICD. Plan:   1. No acute need for dialysis today. Will get regular dialysis as per TTS schedule. 2.  IV antibiotics as per ESRD dosing per infectious diseases. 3.  Might require KEIKO to rule out endocarditis as patient does have AICD. Infectious disease on board. 4.  BMP in a.m.  5.  Will follow. Nutrition   Please ensure that patient is on a renal diet/TF. Avoid nephrotoxic drugs/contrast exposure. We will continue to follow along with you. Vineet Walker MD  [facility]     This note is created with the assistance of a speech-recognition program. While intending to generate a document that actually reflects the content of the visit, no guarantees can be provided that every mistake has been identified and corrected by editing. Patient/Caregiver provided printed discharge information.

## 2022-06-22 ENCOUNTER — APPOINTMENT (OUTPATIENT)
Dept: PSYCHIATRY | Facility: CLINIC | Age: 27
End: 2022-06-22

## 2022-06-22 ENCOUNTER — OUTPATIENT (OUTPATIENT)
Dept: OUTPATIENT SERVICES | Facility: HOSPITAL | Age: 27
LOS: 1 days | Discharge: HOME | End: 2022-06-22

## 2022-06-22 DIAGNOSIS — F60.3 BORDERLINE PERSONALITY DISORDER: ICD-10-CM

## 2022-06-22 DIAGNOSIS — F32.A DEPRESSION, UNSPECIFIED: ICD-10-CM

## 2022-06-22 DIAGNOSIS — F43.10 POST-TRAUMATIC STRESS DISORDER, UNSPECIFIED: ICD-10-CM

## 2022-06-27 ENCOUNTER — NON-APPOINTMENT (OUTPATIENT)
Age: 27
End: 2022-06-27

## 2022-06-30 ENCOUNTER — NON-APPOINTMENT (OUTPATIENT)
Age: 27
End: 2022-06-30

## 2022-06-30 ENCOUNTER — APPOINTMENT (OUTPATIENT)
Dept: PSYCHIATRY | Facility: CLINIC | Age: 27
End: 2022-06-30

## 2022-07-11 ENCOUNTER — APPOINTMENT (OUTPATIENT)
Dept: PSYCHIATRY | Facility: CLINIC | Age: 27
End: 2022-07-11

## 2022-07-14 ENCOUNTER — APPOINTMENT (OUTPATIENT)
Dept: PSYCHIATRY | Facility: CLINIC | Age: 27
End: 2022-07-14

## 2022-07-14 ENCOUNTER — OUTPATIENT (OUTPATIENT)
Dept: OUTPATIENT SERVICES | Facility: HOSPITAL | Age: 27
LOS: 1 days | Discharge: HOME | End: 2022-07-14

## 2022-07-14 DIAGNOSIS — F43.10 POST-TRAUMATIC STRESS DISORDER, UNSPECIFIED: ICD-10-CM

## 2022-07-14 DIAGNOSIS — F32.A DEPRESSION, UNSPECIFIED: ICD-10-CM

## 2022-07-14 DIAGNOSIS — F60.3 BORDERLINE PERSONALITY DISORDER: ICD-10-CM

## 2022-07-27 ENCOUNTER — OUTPATIENT (OUTPATIENT)
Dept: OUTPATIENT SERVICES | Facility: HOSPITAL | Age: 27
LOS: 1 days | Discharge: HOME | End: 2022-07-27

## 2022-07-27 ENCOUNTER — APPOINTMENT (OUTPATIENT)
Dept: PSYCHIATRY | Facility: CLINIC | Age: 27
End: 2022-07-27

## 2022-07-27 DIAGNOSIS — F43.10 POST-TRAUMATIC STRESS DISORDER, UNSPECIFIED: ICD-10-CM

## 2022-07-27 DIAGNOSIS — F32.A DEPRESSION, UNSPECIFIED: ICD-10-CM

## 2022-07-27 DIAGNOSIS — F60.3 BORDERLINE PERSONALITY DISORDER: ICD-10-CM

## 2022-08-16 ENCOUNTER — APPOINTMENT (OUTPATIENT)
Dept: PSYCHIATRY | Facility: CLINIC | Age: 27
End: 2022-08-16

## 2022-08-24 ENCOUNTER — APPOINTMENT (OUTPATIENT)
Dept: PSYCHIATRY | Facility: CLINIC | Age: 27
End: 2022-08-24

## 2022-08-24 ENCOUNTER — OUTPATIENT (OUTPATIENT)
Dept: OUTPATIENT SERVICES | Facility: HOSPITAL | Age: 27
LOS: 1 days | Discharge: HOME | End: 2022-08-24

## 2022-08-24 DIAGNOSIS — F32.A DEPRESSION, UNSPECIFIED: ICD-10-CM

## 2022-08-24 DIAGNOSIS — F60.3 BORDERLINE PERSONALITY DISORDER: ICD-10-CM

## 2022-08-24 DIAGNOSIS — F90.9 ATTENTION-DEFICIT HYPERACTIVITY DISORDER, UNSPECIFIED TYPE: ICD-10-CM

## 2022-09-18 ENCOUNTER — NON-APPOINTMENT (OUTPATIENT)
Age: 27
End: 2022-09-18

## 2022-09-22 ENCOUNTER — APPOINTMENT (OUTPATIENT)
Dept: PSYCHIATRY | Facility: CLINIC | Age: 27
End: 2022-09-22

## 2022-09-22 ENCOUNTER — EMERGENCY (EMERGENCY)
Facility: HOSPITAL | Age: 27
LOS: 0 days | Discharge: HOME | End: 2022-09-22
Attending: EMERGENCY MEDICINE | Admitting: EMERGENCY MEDICINE

## 2022-09-22 VITALS
DIASTOLIC BLOOD PRESSURE: 87 MMHG | TEMPERATURE: 98 F | WEIGHT: 167.99 LBS | HEART RATE: 102 BPM | HEIGHT: 59 IN | OXYGEN SATURATION: 99 % | RESPIRATION RATE: 16 BRPM | SYSTOLIC BLOOD PRESSURE: 141 MMHG
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
## 2022-09-22 DIAGNOSIS — F31.9 BIPOLAR DISORDER, UNSPECIFIED: ICD-10-CM

## 2022-09-22 DIAGNOSIS — F43.10 POST-TRAUMATIC STRESS DISORDER, UNSPECIFIED: ICD-10-CM

## 2022-09-22 DIAGNOSIS — S05.02XA INJURY OF CONJUNCTIVA AND CORNEAL ABRASION WITHOUT FOREIGN BODY, LEFT EYE, INITIAL ENCOUNTER: ICD-10-CM

## 2022-09-22 DIAGNOSIS — F60.3 BORDERLINE PERSONALITY DISORDER: ICD-10-CM

## 2022-09-22 DIAGNOSIS — Z88.1 ALLERGY STATUS TO OTHER ANTIBIOTIC AGENTS STATUS: ICD-10-CM

## 2022-09-22 DIAGNOSIS — Z88.0 ALLERGY STATUS TO PENICILLIN: ICD-10-CM

## 2022-09-22 DIAGNOSIS — H57.12 OCULAR PAIN, LEFT EYE: ICD-10-CM

## 2022-09-22 DIAGNOSIS — Z91.018 ALLERGY TO OTHER FOODS: ICD-10-CM

## 2022-09-22 DIAGNOSIS — Z87.19 PERSONAL HISTORY OF OTHER DISEASES OF THE DIGESTIVE SYSTEM: ICD-10-CM

## 2022-09-22 DIAGNOSIS — Z88.3 ALLERGY STATUS TO OTHER ANTI-INFECTIVE AGENTS: ICD-10-CM

## 2022-09-22 DIAGNOSIS — H53.8 OTHER VISUAL DISTURBANCES: ICD-10-CM

## 2022-09-22 PROCEDURE — 70480 CT ORBIT/EAR/FOSSA W/O DYE: CPT | Mod: 26,MA

## 2022-09-22 PROCEDURE — 99285 EMERGENCY DEPT VISIT HI MDM: CPT

## 2022-09-22 PROCEDURE — 70450 CT HEAD/BRAIN W/O DYE: CPT | Mod: 26,MA

## 2022-09-22 RX ORDER — OFLOXACIN 0.3 %
5 DROPS OPHTHALMIC (EYE)
Qty: 1 | Refills: 0
Start: 2022-09-22 | End: 2022-10-01

## 2022-09-22 RX ORDER — OFLOXACIN 0.3 %
1 DROPS OPHTHALMIC (EYE) ONCE
Refills: 0 | Status: COMPLETED | OUTPATIENT
Start: 2022-09-22 | End: 2022-09-22

## 2022-09-22 RX ADMIN — Medication 1 DROP(S): at 18:34

## 2022-09-22 NOTE — ED ADULT TRIAGE NOTE - CHIEF COMPLAINT QUOTE
pt came to ED c/o left eye pain and intermittent blurry vision x1 week. pt was at her opthalmologist today, who states there was "nothing to see"

## 2022-09-22 NOTE — ED ADULT TRIAGE NOTE - RESPIRATORY RATE (BREATHS/MIN)
No new complaints.  Good FM.  Denies VB, LOF, Ctxs.  Glucola today  tdap next visit     
Reviewed.  Results released to [employer].  1hgtt normal.  Hgb 10.2.
16

## 2022-09-22 NOTE — ED PROVIDER NOTE - PHYSICAL EXAMINATION
CONSTITUTIONAL: Well-appearing; well-nourished; in no apparent distress.   HEAD: Normocephalic; atraumatic.   EYES: PERRL; EOM intact. Conjunctiva normal B/L. visual acuity w/o glasses OS 20/20, OD 40/20, darius R 19, L20; +corneal abrasion of L eye near iris, no ermias sign   ENT: patent oropharynx  NECK: Supple; non-tender;  CHEST: Normal chest excursion with respiration.   CARDIOVASCULAR: regular  RESPIRATORY: Normal chest excursion with respiration  GI/: non-distended  BACK: No evidence of trauma or deformity, no cva tenderness  EXT: Normal ROM in all four extremities   SKIN: Normal for age and race; warm; dry; good turgor.  NEURO: A & O x 4; CN 2-12 intact

## 2022-09-22 NOTE — ED PROVIDER NOTE - ATTENDING CONTRIBUTION TO CARE
26 yo f with pmh of bpd, depression, ibs, presents with c/o L eye blurriness.  pt went to optometrist today and had dilation procedure and staining and was told no pathology and to go to ED for brain imaging.  pt denies focal numbness, weakness, facial droop, slurred speech.  exam: nad, ncat, perrl, eomi, mmm, rrr, ctab, abd soft, nt, nd aox3, +small corneal abrasion at the edge of the iris at 3 oclock imp: pt with L eye blurriness, ct head

## 2022-09-22 NOTE — ED PROVIDER NOTE - PATIENT PORTAL LINK FT
You can access the FollowMyHealth Patient Portal offered by [facility] by registering at the following website: [email or web address]. By joining [employer]’s FollowMyHealth portal, you will also be able to view your health information using other applications (apps) compatible with our system.

## 2022-09-22 NOTE — ED PROVIDER NOTE - OBJECTIVE STATEMENT
27-year-old female complaints of left thigh pain today.  Patient states that she had sudden onset left thigh pain when entering a bright room.  Patient states that the pain gets better when she closes her eyes and with rest.  Patient denies history of glaucoma.  Patient has not tried any medications.  Patient wears glasses at baseline.  Denies trauma, headache, visual hallucinations, floaters, neck pain, fever, nausea, vomiting. Patient was seen by her optometrist today who performed visual acuity, slit-lamp exam and states that there are no physical abnormalities. 27-year-old female complaints of left eye pain today.  Patient states that she had sudden onset left eye pain when entering a bright room.  Patient states that the pain gets better when she closes her eyes and with rest.  Patient denies history of glaucoma.  Patient has not tried any medications.  Patient wears glasses at baseline.  Denies trauma, headache, visual hallucinations, floaters, neck pain, fever, nausea, vomiting. Patient was seen by her optometrist today who performed visual acuity, slit-lamp exam and states that there are no physical abnormalities.

## 2022-10-04 ENCOUNTER — OUTPATIENT (OUTPATIENT)
Dept: OUTPATIENT SERVICES | Facility: HOSPITAL | Age: 27
LOS: 1 days | Discharge: HOME | End: 2022-10-04

## 2022-10-04 ENCOUNTER — APPOINTMENT (OUTPATIENT)
Dept: PSYCHIATRY | Facility: CLINIC | Age: 27
End: 2022-10-04
Payer: MEDICAID

## 2022-10-04 DIAGNOSIS — F60.3 BORDERLINE PERSONALITY DISORDER: ICD-10-CM

## 2022-10-04 DIAGNOSIS — F90.9 ATTENTION-DEFICIT HYPERACTIVITY DISORDER, UNSPECIFIED TYPE: ICD-10-CM

## 2022-10-04 DIAGNOSIS — F32.A DEPRESSION, UNSPECIFIED: ICD-10-CM

## 2022-10-04 PROCEDURE — 99214 OFFICE O/P EST MOD 30 MIN: CPT | Mod: NC,95

## 2022-10-04 RX ORDER — QUETIAPINE 300 MG/1
300 TABLET, FILM COATED, EXTENDED RELEASE ORAL DAILY
Qty: 30 | Refills: 1 | Status: COMPLETED | COMMUNITY
Start: 2021-09-15 | End: 2022-09-27

## 2022-10-04 RX ORDER — QUETIAPINE 50 MG/1
50 TABLET, FILM COATED, EXTENDED RELEASE ORAL
Qty: 30 | Refills: 1 | Status: COMPLETED | COMMUNITY
Start: 2022-04-20 | End: 2022-09-27
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
## 2022-10-04 NOTE — REASON FOR VISIT
[Home] : at home, [unfilled] , at the time of the visit. [Medical Office: ([facility])___] : at the medical office located in  [Patient] : Patient [FreeTextEntry1] : Med management

## 2022-10-04 NOTE — PHYSICAL EXAM
[Cooperative] : cooperative [Full] : full [Clear] : clear [Linear/Goal Directed] : linear/goal directed [None Reported] : none reported [Average] : average [WNL] : within normal limits [FreeTextEntry1] : colorful hair, well dressed and well groomed [FreeTextEntry8] : "better" [de-identified] : fair

## 2022-10-04 NOTE — DISCUSSION/SUMMARY
[FreeTextEntry1] : Patient is a 25yo F (identifies as "Julio," non-binary, they'/them pronouns. ), recently single, domiciled with grandmother, with PMH of heart murmur with atrial septal defect (benign), PPH of borderline personality disorder, PTSD, obsessive compulsive sx, with 2 prior IPP admissions and 1 prior suicide attempt at age 15 via pill ingestion, current medical marijuana use for PTSD, presenting for follow up examination.\par \par Today, patient reports that they feel better after restarting her Seroquel 400mg. Despite numerous psychosocial stressors, pt reports they are well, using their coping skills and denied si and hi. pt was future oriented and linear in thought. Currently the patient was not actively suicidal, homicidal, psychotic or manic during the interview. \par \par Risk assessment: Risk factors include history of suicide attempt, personality disorder diagnosis, PTSD symptoms, depressive symptoms, SIB; protective factors include no acute suicidality, future oriented towards completing coursework, social support from family/friends, engaged and compliant with treatment. Patient is therefore low acute suicide risk despite her chronically elevated risk.\par \par Plan:\par - continue Effexor XR 225mg (150mg + 75mg) to target anxiety and depression.\par - continue Quetiapine Extended Release  400mg PO QHS.\par - Continue Topiramate 25mg BID.\par - Will discuss with patient options for stimulants in future, pending collateral from family and teacher for possible dx of ADHD\par - Defer Gabapentin 300mg BID to patient's other doctors to target her pain symptoms.\par - Continue Hydroxyzine 25mg PO BID PRN for anxiety.\par - Follow up in 4wks.\par - Continue with school therapist and support group.

## 2022-10-18 ENCOUNTER — NON-APPOINTMENT (OUTPATIENT)
Age: 27
End: 2022-10-18

## 2022-11-03 ENCOUNTER — OUTPATIENT (OUTPATIENT)
Dept: OUTPATIENT SERVICES | Facility: HOSPITAL | Age: 27
LOS: 1 days | Discharge: HOME | End: 2022-11-03

## 2022-11-03 ENCOUNTER — APPOINTMENT (OUTPATIENT)
Dept: PSYCHIATRY | Facility: CLINIC | Age: 27
End: 2022-11-03
Payer: MEDICAID

## 2022-11-03 ENCOUNTER — EMERGENCY (EMERGENCY)
Facility: HOSPITAL | Age: 27
LOS: 0 days | Discharge: HOME | End: 2022-11-03
Attending: STUDENT IN AN ORGANIZED HEALTH CARE EDUCATION/TRAINING PROGRAM | Admitting: STUDENT IN AN ORGANIZED HEALTH CARE EDUCATION/TRAINING PROGRAM

## 2022-11-03 VITALS
DIASTOLIC BLOOD PRESSURE: 97 MMHG | HEART RATE: 112 BPM | HEIGHT: 59 IN | TEMPERATURE: 98 F | RESPIRATION RATE: 20 BRPM | SYSTOLIC BLOOD PRESSURE: 148 MMHG | OXYGEN SATURATION: 98 %

## 2022-11-03 VITALS
OXYGEN SATURATION: 99 % | HEART RATE: 84 BPM | DIASTOLIC BLOOD PRESSURE: 75 MMHG | TEMPERATURE: 98 F | SYSTOLIC BLOOD PRESSURE: 121 MMHG | RESPIRATION RATE: 18 BRPM

## 2022-11-03 DIAGNOSIS — F32.A DEPRESSION, UNSPECIFIED: ICD-10-CM

## 2022-11-03 DIAGNOSIS — Z91.018 ALLERGY TO OTHER FOODS: ICD-10-CM

## 2022-11-03 DIAGNOSIS — R09.89 OTHER SPECIFIED SYMPTOMS AND SIGNS INVOLVING THE CIRCULATORY AND RESPIRATORY SYSTEMS: ICD-10-CM

## 2022-11-03 DIAGNOSIS — F60.3 BORDERLINE PERSONALITY DISORDER: ICD-10-CM

## 2022-11-03 DIAGNOSIS — K58.9 IRRITABLE BOWEL SYNDROME WITHOUT DIARRHEA: ICD-10-CM
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
## 2022-11-03 DIAGNOSIS — T78.40XA ALLERGY, UNSPECIFIED, INITIAL ENCOUNTER: ICD-10-CM

## 2022-11-03 DIAGNOSIS — Z86.59 PERSONAL HISTORY OF OTHER MENTAL AND BEHAVIORAL DISORDERS: ICD-10-CM

## 2022-11-03 DIAGNOSIS — Z88.1 ALLERGY STATUS TO OTHER ANTIBIOTIC AGENTS STATUS: ICD-10-CM
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
## 2022-11-03 DIAGNOSIS — Z87.19 PERSONAL HISTORY OF OTHER DISEASES OF THE DIGESTIVE SYSTEM: ICD-10-CM

## 2022-11-03 DIAGNOSIS — F43.10 POST-TRAUMATIC STRESS DISORDER, UNSPECIFIED: ICD-10-CM

## 2022-11-03 DIAGNOSIS — Z88.8 ALLERGY STATUS TO OTHER DRUGS, MEDICAMENTS AND BIOLOGICAL SUBSTANCES STATUS: ICD-10-CM

## 2022-11-03 PROCEDURE — 99284 EMERGENCY DEPT VISIT MOD MDM: CPT

## 2022-11-03 PROCEDURE — 99214 OFFICE O/P EST MOD 30 MIN: CPT | Mod: NC,95

## 2022-11-03 RX ORDER — FAMOTIDINE 10 MG/ML
40 INJECTION INTRAVENOUS ONCE
Refills: 0 | Status: COMPLETED | OUTPATIENT
Start: 2022-11-03 | End: 2022-11-03

## 2022-11-03 RX ORDER — DEXAMETHASONE 0.5 MG/5ML
10 ELIXIR ORAL ONCE
Refills: 0 | Status: COMPLETED | OUTPATIENT
Start: 2022-11-03 | End: 2022-11-03

## 2022-11-03 RX ADMIN — FAMOTIDINE 40 MILLIGRAM(S): 10 INJECTION INTRAVENOUS at 04:00

## 2022-11-03 RX ADMIN — Medication 10 MILLIGRAM(S): at 04:00

## 2022-11-03 NOTE — ED PROVIDER NOTE - PHYSICAL EXAMINATION
CONSTITUTIONAL: NAD  SKIN: Warm dry  HEAD: NCAT  EYES: NL inspection  ENT: MMM  NECK: Supple; non tender.  CARD: RRR  RESP: CTAB  ABD: S/NT no R/G  BACK: nontender  EXT: no pedal edema  NEURO: Grossly unremarkable  PSYCH: Cooperative, appropriate.

## 2022-11-03 NOTE — ED PROVIDER NOTE - NSFOLLOWUPINSTRUCTIONS_ED_ALL_ED_FT
Allergies, Adult  An allergy is when your body's defense system (immune system) overreacts to an otherwise harmless substance (allergen) that you breathe in or eat or something that touches your skin. When you come into contact with something that you are allergic to, your immune system produces certain proteins (antibodies). These proteins cause cells to release chemicals (histamines) that trigger the symptoms of an allergic reaction.  Allergies often affect the nasal passages (allergic rhinitis), eyes (allergic conjunctivitis), skin (atopic dermatitis), and stomach. Allergies can be mild or severe. Allergies cannot spread from person to person (are not contagious). They can develop at any age and may be outgrown.  What increases the risk?  You may be at greater risk of allergies if other people in your family have allergies.  What are the signs or symptoms?  Symptoms depend on what type of allergy you have. They may include:  Runny, stuffy nose.Sneezing.Itchy mouth, ears, or throat.Postnasal drip.Sore throat.Itchy, red, watery, or puffy eyes.Skin rash or hives.Stomach pain.Vomiting.Diarrhea.Bloating.Wheezing or coughing.People with a severe allergy to food, medicine, or an insect bite may have a life-threatening allergic reaction (anaphylaxis). Symptoms of anaphylaxis include:  Hives.Itching.Flushed face.Swollen lips, tongue, or mouth.Tight or swollen throat.Chest pain or tightness in the chest.Trouble breathing or shortness of breath.Rapid heartbeat.Dizziness or fainting.Vomiting.Diarrhea.Pain in the abdomen.How is this diagnosed?  This condition is diagnosed based on:  Your symptoms.Your family and medical history.A physical exam.You may need to see a health care provider who specializes in treating allergies (allergist). You may also have tests, including:  Skin tests to see which allergens are causing your symptoms, such as:  Skin prick test. In this test, your skin is pricked with a tiny needle and exposed to small amounts of possible allergens to see if your skin reacts.Intradermal skin test. In this test, a small amount of allergen is injected under your skin to see if your skin reacts.Patch test. In this test, a small amount of allergen is placed on your skin and then your skin is covered with a bandage. Your health care provider will check your skin after a couple of days to see if a rash has developed.Blood tests.Challenges tests. In this test, you inhale a small amount of allergen by mouth to see if you have an allergic reaction.You may also be asked to:  Keep a food diary. A food diary is a record of all the foods and drinks you have in a day and any symptoms you experience.Practice an elimination diet. An elimination diet involves eliminating specific foods from your diet and then adding them back in one by one to find out if a certain food causes an allergic reaction.How is this treated?  Treatment for allergies depends on your symptoms. Treatment may include:  Cold compresses to soothe itching and swelling.Eye drops.Nasal sprays.Using a saline spray or container (neti pot) to flush out the nose (nasal irrigation). These methods can help clear away mucus and keep the nasal passages moist.Using a humidifier.Oral antihistamines or other medicines to block allergic reaction and inflammation.Skin creams to treat rashes or itching.Diet changes to eliminate food allergy triggers.Repeated exposure to tiny amounts of allergens to build up a tolerance and prevent future allergic reactions (immunotherapy). These include:  Allergy shots.Oral treatment. This involves taking small doses of an allergen under the tongue (sublingual immunotherapy).Emergency epinephrine injection (auto-injector) in case of an allergic emergency. This is a self-injectable, pre-measured medicine that must be given within the first few minutes of a serious allergic reaction.Follow these instructions at home:         Avoid known allergens whenever possible.If you suffer from airborne allergens, wash out your nose daily. You can do this with a saline spray or a neti pot to flush out your nose (nasal irrigation).Take over-the-counter and prescription medicines only as told by your health care provider.Keep all follow-up visits as told by your health care provider. This is important.If you are at risk of a severe allergic reaction (anaphylaxis), keep your auto-injector with you at all times.If you have ever had anaphylaxis, wear a medical alert bracelet or necklace that states you have a severe allergy.Contact a health care provider if:  Your symptoms do not improve with treatment.Get help right away if:  You have symptoms of anaphylaxis, such as:  Swollen mouth, tongue, or throat.Pain or tightness in your chest.Trouble breathing or shortness of breath.Dizziness or fainting.Severe abdominal pain, vomiting, or diarrhea.This information is not intended to replace advice given to you by your health care provider. Make sure you discuss any questions you have with your health care provider.

## 2022-11-03 NOTE — SOCIAL HISTORY
[FreeTextEntry1] : 2 previous engagements, in same-sex relationship since 10/2018. Currently unemployed, Former [employer] ; Studying for bachelors in psychology and minor in women and gender studies, to graduate 2023. Mom in [place], Dad in [place]. Has 7 siblings, raised 5 siblings. Has friends. currently working as pharmacy tech. Domiciled with grandmother in [facility]. \par \par Description: medicinal marijuana for PTSD; 0.5 cartridge/month with daily use

## 2022-11-03 NOTE — PHYSICAL EXAM
[Cooperative] : cooperative [Full] : full [Clear] : clear [Linear/Goal Directed] : linear/goal directed [None Reported] : none reported [Average] : average [WNL] : within normal limits [FreeTextEntry1] : colorful hair, well dressed and well groomed [FreeTextEntry8] : "tired" [de-identified] : fair
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
## 2022-11-03 NOTE — ED PROVIDER NOTE - WET READ LAUNCH FT
Chief Complaint   Patient presents with    Anticoagulation     PT/INR
Pt in for PT/INR. INR 1.9. Instructed pt to continue current dose of warfarin and watch her green leafy vegetables and return in 2 wks for recheck.  Instructions given per Karlene Sepulveda
There are no Wet Read(s) to document.

## 2022-11-03 NOTE — DISCUSSION/SUMMARY
[Plan Review] : Plan Review [Able to manage surrounding demands and opportunities] : able to manage surrounding demands and opportunities [Adherent to treatment recommendations] : adherent to treatment recommendations [Articulate] : articulate [Cognitively intact] : cognitively intact [Motivated to participate in treatment] : motivated to participate in treatment [Health literacy] : health literacy [Motivated to maintain or improve physical health] : motivated to maintain or improve physical health [In good health] : in good health [Civic organizations] : civic organizations [Social supports] : social supports [FreeTextEntry5] : To keep my bpd, depression, ptsd and anxiety under control [Mental Health] : Mental Health [Continued - Progress made] : Continued - Progress made: [every ___ months] : every [unfilled] months [Treatment is no longer medically necessary as evidenced by:] : Treatment is no longer medically necessary as evidenced by: [Yes] : Yes [Psychiatric Provider/Prescriber] : Psychiatric Provider/Prescriber [FreeTextEntry1] : emotional dysregulation [de-identified] : patient has private therapist outside, q1 week per patient [FreeTextEntry4] : to keep my mental health under control by taking my medications and continuing therapy  [de-identified] : patient is able to function at baseline without need for medications

## 2022-11-03 NOTE — PHYSICAL EXAM
[Cooperative] : cooperative [Full] : full [Clear] : clear [Linear/Goal Directed] : linear/goal directed [None Reported] : none reported [Average] : average [WNL] : within normal limits [FreeTextEntry1] : colorful hair, well dressed and well groomed [FreeTextEntry8] : "tired" [de-identified] : fair

## 2022-11-03 NOTE — ED PROVIDER NOTE - PATIENT PORTAL LINK FT
You can access the FollowMyHealth Patient Portal offered by [facility] by registering at the following website: [email or web address]. By joining [employer] [facility]’s FollowMyHealth portal, you will also be able to view your health information using other applications (apps) compatible with our system.

## 2022-11-03 NOTE — DISCUSSION/SUMMARY
[Plan Review] : Plan Review [Able to manage surrounding demands and opportunities] : able to manage surrounding demands and opportunities [Adherent to treatment recommendations] : adherent to treatment recommendations [Articulate] : articulate [Cognitively intact] : cognitively intact [Motivated to participate in treatment] : motivated to participate in treatment [Health literacy] : health literacy [Motivated to maintain or improve physical health] : motivated to maintain or improve physical health [In good health] : in good health [Civic organizations] : civic organizations [Social supports] : social supports [FreeTextEntry5] : To keep my bpd, depression, ptsd and anxiety under control [Mental Health] : Mental Health [Continued - Progress made] : Continued - Progress made: [every ___ months] : every [unfilled] months [Treatment is no longer medically necessary as evidenced by:] : Treatment is no longer medically necessary as evidenced by: [Yes] : Yes [Psychiatric Provider/Prescriber] : Psychiatric Provider/Prescriber [FreeTextEntry1] : emotional dysregulation [FreeTextEntry4] : to keep my mental health under control by taking my medications and continuing therapy  [de-identified] : patient has private therapist outside, q1 week per patient [de-identified] : patient is able to function at baseline without need for medications

## 2022-11-03 NOTE — ED PROVIDER NOTE - ATTENDING APP SHARED VISIT CONTRIBUTION OF CARE
27-year-old female with no past medical he presents with sore throat.  Patient states that she has been having sore throat and body aches for the last couple of days.  Patient denies any nausea vomiting fever chills or any other medical complaints.      VITAL SIGNS: I have reviewed nursing notes and confirm.  CONSTITUTIONAL: non-toxic, well appearing  SKIN: no rash, no petechiae.  EYES:  EOMI, pink conjunctiva, anicteric  ENT: tongue midline, no exudates, MMM  NECK: Supple; no meningismus, no JVD  CARD: RRR, no murmurs, equal radial pulses bilaterally 2+  RESP: CTAB, no respiratory distress  ABD: Soft, non-tender, non-distended, no peritoneal signs, no HSM, no CVA tenderness  EXT: Normal ROM x4. No edema. No calves tenderness  NEURO: Alert, oriented x 3     a/p  27 yr old f that presents with sore throat   -will obtain UA and RVP swab. will dc pt with pcp follow up and strict return precautions. pt verbalized understanding and agrees with plan. 27-year-old female with no past medical history who presents status post allergic reaction.  Patient states that she ate some cookies and ice cream with possible amount and felt a tingling-like sensation in her mouth and throat.  Patient took 50 mg of Benadryl and symptoms have improved.  Patient denies any shortness of breath chest pain nausea vomiting trismus difficulty breathing difficulty swallowing or any other medical complaints.    VITAL SIGNS: I have reviewed nursing notes and confirm.  CONSTITUTIONAL: non-toxic, well appearing  SKIN: no rash, no petechiae.  EYES: EOMI, pink conjunctiva, anicteric  ENT: tongue midline, no exudates, MMM, uvula midline  NECK: Supple; no meningismus, no JVD  CARD: RRR, no murmurs, equal radial pulses bilaterally 2+  RESP: CTAB, no respiratory distress  ABD: Soft, non-tender, non-distended, no peritoneal signs, no HSM, no CVA tenderness  EXT: Normal ROM x4. No edema. No calves tenderness  NEURO: Alert, oriented x3.     a/p  27 yr old f that presents with an allergic rxn. improving. pepcid, and decadron. reassess. dispo pending.

## 2022-11-03 NOTE — REASON FOR VISIT
[Home] : at home, [unfilled] , at the time of the visit. [Medical Office: ([facility])___] : at the medical office located in  [Patient] : the patient [FreeTextEntry1] : Med management

## 2022-11-03 NOTE — REASON FOR VISIT
[Home] : at home, [unfilled] , at the time of the visit. [Medical Office: ([facility])___] : at the medical office located in  [Patient] : the patient [FreeTextEntry1] : Med management

## 2022-11-03 NOTE — ED PROVIDER NOTE - OBJECTIVE STATEMENT
Pt is a 27 y.o F with PMHx of IBS, Anorexia, PTSD, Depression, who presents to the ED with chief complaint of allergic reaction. Per pt, she states she had some cookies with cashews and developed  a tingling-like sensation in her mouth and throat about an hour PTA to the ED. Pt took 50mg of Benadryl with some improvement of her symptoms. Pt denies any fever, chills, sob, swelling, difficulty swallowing, CP, nausea, vomiting, diarrhea, dysuria,  hives or acute rash. Pt denies ever having to be intubated before from an allergic reaction.

## 2022-11-03 NOTE — SOCIAL HISTORY
[FreeTextEntry1] : 2 previous engagements, in same-sex relationship since 10/2018. Currently unemployed, Former [employer] ; Studying for bachelors in psychology and minor in women and gender studies, to graduate 2023. Mom in [place], Dad in [place]. Has 7 siblings, raised 5 siblings. Has friends. currently working as pharmacy tech. Domiciled with grandmother in [facility]. \par \par Description: medicinal marijuana for PTSD; 0.5 cartridge/month with daily use
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
## 2022-11-03 NOTE — ED PROVIDER NOTE - CLINICAL SUMMARY MEDICAL DECISION MAKING FREE TEXT BOX
27 yr old f that presents with an allergic rxn. improving. pepcid, and decadron. pt reports improvement during eval. will dc pt with pcp follow up and allergy/immunology.     I have discussed the discharge plan with the patient. The patient agrees with the plan, as discussed.  The patient understands Emergency Department diagnosis is a preliminary diagnosis often based on limited information and that the patient must adhere to the follow-up plan as discussed.  The patient understands that if the symptoms worsen or if prescribed medications do not have the desired/planned effect that the patient may return to the Emergency Department at any time for further evaluation and treatment.

## 2022-11-09 ENCOUNTER — APPOINTMENT (OUTPATIENT)
Dept: PEDIATRIC ALLERGY IMMUNOLOGY | Facility: CLINIC | Age: 27
End: 2022-11-09

## 2022-11-09 VITALS
DIASTOLIC BLOOD PRESSURE: 60 MMHG | WEIGHT: 170 LBS | SYSTOLIC BLOOD PRESSURE: 90 MMHG | BODY MASS INDEX: 34.27 KG/M2 | HEIGHT: 59 IN

## 2022-11-09 DIAGNOSIS — T78.00XA ANAPHYLACTIC REACTION DUE TO UNSPECIFIED FOOD, INITIAL ENCOUNTER: ICD-10-CM

## 2022-11-09 DIAGNOSIS — T78.05XA ANAPHYLACTIC REACTION DUE TO TREE NUTS AND SEEDS, INITIAL ENCOUNTER: ICD-10-CM

## 2022-11-09 DIAGNOSIS — T78.01XA ANAPHYLACTIC REACTION DUE TO PEANUTS, INITIAL ENCOUNTER: ICD-10-CM

## 2022-11-09 PROCEDURE — 99203 OFFICE O/P NEW LOW 30 MIN: CPT

## 2022-11-09 NOTE — HISTORY OF PRESENT ILLNESS
[None] : The patient is currently asymptomatic [de-identified] : AUGUSTO REYNOSO is a 27 year old female with a history of mouth, tongue and jaw numbness, itchy throat, wheezing and tightness of the chest when having a nut bar two weeks ago. She took  two Benadryl and went to ER which they gave Dexamethasone and monitored her and sent her home. She did well. Two to three days later she had the same type of reaction and saw her PCP Wednesday last week, she gave Prednisone 20 mg a day for a week plus Benadryl every four hours and recommended allergy evaluation.  \par \par Going back years she used to have upper respiratory allergies, she gets them in the fall and winter months, she also had asthma periodically, always treated and released as an outpatient. Her asthma is aggravated by cold air and exercise. No steady follow up by anyone. \par \par She is referred here for evaluation of food allergies.  [de-identified] : [place], Kiwi, Papaya, Marveluit

## 2022-11-09 NOTE — PHYSICAL EXAM

## 2022-11-09 NOTE — SOCIAL HISTORY
[Apartment] : [unfilled] lives in an apartment  [Central Forced Air] : heating provided by central forced air [Central] : air conditioning provided by central unit [Dry] : dry [Bedroom] :  in bedroom [Dog] : dog [Single] : single [Humidifier] : does not use a humidifier [Dehumidifier] : does not use a dehumidifier [Cockroaches] : Patient states that there are no cockroaches in the home [Dust Mite Covers] : does not have dust mite covers [Feather Pillows] : does not have feather pillows [Feather Comforter] : does not have a feather comforter [Basement] : not in the basement [Living Area] : not in the living area [Smokers in Household] : there are no smokers in the home [de-identified] : Dorm room

## 2022-11-09 NOTE — ASSESSMENT
[FreeTextEntry1] : The patient is a 27 year old female with a history suggestive of possible peanut and tree nut allergy. I have ordered screening ImmunoCAP for various foods and will see her in one week to assess.

## 2022-11-25 ENCOUNTER — APPOINTMENT (OUTPATIENT)
Dept: PEDIATRIC ALLERGY IMMUNOLOGY | Facility: CLINIC | Age: 27
End: 2022-11-25

## 2022-11-25 VITALS — BODY MASS INDEX: 34.27 KG/M2 | WEIGHT: 170 LBS | HEIGHT: 59 IN

## 2022-11-25 PROCEDURE — 99213 OFFICE O/P EST LOW 20 MIN: CPT

## 2022-11-25 NOTE — ASSESSMENT
[FreeTextEntry1] : The patient is a 27 year female with a history suggestive of peanut and tree nut allergy. I have ordered screening ImmunoCAP for various peanuts an Tree nuts which was essentially negative despite her reactions.  She has an EpiPen and knows how to use it. I have also recommended Zyrtec 10 mg daily. I will evaluate and repeat labs in 6 weeks to assess.\par

## 2022-11-25 NOTE — HISTORY OF PRESENT ILLNESS
[de-identified] : AUGUSTO REYNOSO is a 27 year old female with a history of mouth, tongue and jaw numbness, itchy throat, wheezing and tightness of the chest when having a nut bar two weeks ago. She took  two Benadryl and went to ER which they gave Dexamethasone and monitored her and sent her home. She did well. Two to three days later she had the same type of reaction and saw her PCP Wednesday last week, she gave Prednisone 20 mg a day for a week plus Benadryl every four hours and recommended allergy evaluation.  \par \par Going back years she used to have upper respiratory allergies, she gets them in the fall and winter months, she also had asthma periodically, always treated and released as an outpatient. Her asthma is aggravated by cold air and exercise. No steady follow up by anyone. \par \par \par \par She was staying away from tree nuts and peanuts for quite some time. 2-3 weeks ago she had some vanilla ice cream with m & ms on top as well as caramel. At the same time she had some cookies with almonds, walnuts and cashews. Within 15-30 minutes she began to feel hot sensation, itchy chest, throat, lumpy feeling in her throat and numbness of her tongue and lip. She took Benadryl and rushed to the ER where they administered Dexamethasone, observed for 4 hours. Her PCP gave her Prednisone 20mg daily for a week.

## 2022-12-01 ENCOUNTER — OUTPATIENT (OUTPATIENT)
Dept: OUTPATIENT SERVICES | Facility: HOSPITAL | Age: 27
LOS: 1 days | Discharge: HOME | End: 2022-12-01

## 2022-12-01 ENCOUNTER — APPOINTMENT (OUTPATIENT)
Dept: PSYCHIATRY | Facility: CLINIC | Age: 27
End: 2022-12-01

## 2022-12-01 DIAGNOSIS — F43.10 POST-TRAUMATIC STRESS DISORDER, UNSPECIFIED: ICD-10-CM

## 2022-12-01 DIAGNOSIS — F32.A DEPRESSION, UNSPECIFIED: ICD-10-CM

## 2022-12-01 DIAGNOSIS — F60.3 BORDERLINE PERSONALITY DISORDER: ICD-10-CM

## 2022-12-01 PROCEDURE — 99214 OFFICE O/P EST MOD 30 MIN: CPT | Mod: NC,95

## 2022-12-01 NOTE — SOCIAL HISTORY
[FreeTextEntry1] : 2 previous engagements, in same-sex relationship since 10/2018. Currently unemployed, Former [employer] ; Studying for bachelors in psychology and minor in women and gender studies, to graduate 2023. Mom in [place], Dad in [place]. Has 7 siblings, raised 5 siblings. Has friends. currently working as pharmacy tech. Domiciled with grandmother in [facility]. \par \par Description: medicinal marijuana for PTSD; 0.5 cartridge/month with daily use

## 2022-12-01 NOTE — PHYSICAL EXAM
[Cooperative] : cooperative [Full] : full [Clear] : clear [Linear/Goal Directed] : linear/goal directed [None Reported] : none reported [Average] : average [WNL] : within normal limits [FreeTextEntry1] : colorful hair, well dressed and well groomed [FreeTextEntry8] : "tired" [de-identified] : fair
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
## 2022-12-01 NOTE — REASON FOR VISIT
[Home] : at home, [unfilled] , at the time of the visit. [Medical Office: ([facility])___] : at the medical office located in  [Patient] : the patient [FreeTextEntry1] : Med management

## 2022-12-01 NOTE — DISCUSSION/SUMMARY
[FreeTextEntry1] : Patient is a 28yo F (identifies as "Julio," non-binary, they'/them pronouns. ), recently single, domiciled with grandmother, with PMH of heart murmur with atrial septal defect (benign), PPH of borderline personality disorder, PTSD, obsessive compulsive sx, with 2 prior IPP admissions and 1 prior suicide attempt at age 15 via pill ingestion, current medical marijuana use for PTSD, presenting for follow up examination.\par \par Today, patient reports that her mood had been low with some anhedonia, poor concentration, difficulty getting up and insomnia. We explored possible causes of her lower mood state and discussed treatment options, however, patient reports that they would like to defer adjustments at this time.  They report that they continue using their coping skills and denied si and hi. pt was future oriented and linear in thought. Currently the patient was not actively suicidal, homicidal, psychotic or manic during the interview. \par \par Risk assessment: Risk factors include history of suicide attempt, personality disorder diagnosis, PTSD symptoms, depressive symptoms, SIB; protective factors include no acute suicidality, future oriented towards completing coursework, social support from family/friends, engaged and compliant with treatment. Patient is therefore low acute suicide risk despite her chronically elevated risk.\par \par Plan:\par - continue Effexor XR 225mg (150mg + 75mg) to target anxiety and depression.\par - continue Quetiapine Extended Release  400mg PO QHS.\par - continue Topiramate to 50mg BID.\par - Will discuss with patient options for stimulants in future, pending collateral from family and teacher for possible dx of ADHD\par - Defer Gabapentin 300mg BID to patient's other doctors to target her pain symptoms.\par - Continue Hydroxyzine 25mg PO BID PRN for anxiety.\par - Follow up in 2wks.\par - Continue with school therapist and support group.

## 2022-12-16 ENCOUNTER — APPOINTMENT (OUTPATIENT)
Dept: PSYCHIATRY | Facility: CLINIC | Age: 27
End: 2022-12-16

## 2022-12-16 ENCOUNTER — OUTPATIENT (OUTPATIENT)
Dept: OUTPATIENT SERVICES | Facility: HOSPITAL | Age: 27
LOS: 1 days | Discharge: HOME | End: 2022-12-16

## 2022-12-16 DIAGNOSIS — F32.A DEPRESSION, UNSPECIFIED: ICD-10-CM

## 2022-12-16 DIAGNOSIS — F43.10 POST-TRAUMATIC STRESS DISORDER, UNSPECIFIED: ICD-10-CM

## 2022-12-16 DIAGNOSIS — Z79.899 OTHER LONG TERM (CURRENT) DRUG THERAPY: ICD-10-CM

## 2022-12-16 DIAGNOSIS — F60.3 BORDERLINE PERSONALITY DISORDER: ICD-10-CM

## 2022-12-16 PROCEDURE — 99214 OFFICE O/P EST MOD 30 MIN: CPT | Mod: NC,95

## 2022-12-16 NOTE — PHYSICAL EXAM
[Cooperative] : cooperative [Full] : full [Clear] : clear [Linear/Goal Directed] : linear/goal directed [None Reported] : none reported [WNL] : within normal limits [Average] : average [FreeTextEntry1] : colorful hair, casually dressed and groomed [FreeTextEntry8] : "okay" [de-identified] : fair [de-identified] : fair

## 2022-12-16 NOTE — REASON FOR VISIT
[Home] : at home, [unfilled] , at the time of the visit. [Medical Office: ([facility])___] : at the medical office located in  [Patient] : the patient [FreeTextEntry1] : Med management

## 2022-12-16 NOTE — DISCUSSION/SUMMARY
[FreeTextEntry1] : Patient is a 28yo F (identifies as "Julio," non-binary, they'/them pronouns. ), recently single, domiciled with grandmother, with PMH of heart murmur with atrial septal defect (benign), PPH of borderline personality disorder, PTSD, obsessive compulsive sx, with 2 prior IPP admissions and 1 prior suicide attempt at age 15 via pill ingestion, current medical marijuana use for PTSD, presenting for follow up examination.\par \par Today, patient reports improvement in mood after decreasing topiramate, however, she reported notable distress about her weight gain. Patient was educated about risks and benefits of metformin for antipsychotic induced weightgain and patient was agreeable to trial. They report that they continue using their coping skills and denied si and hi. pt was future oriented and linear in thought. Currently the patient was not actively suicidal, homicidal, psychotic or manic during the interview. \par \par Risk assessment: Risk factors include history of suicide attempt, personality disorder diagnosis, PTSD symptoms, depressive symptoms, SIB; protective factors include no acute suicidality, future oriented towards completing coursework, social support from family/friends, engaged and compliant with treatment. Patient is therefore low acute suicide risk despite her chronically elevated risk.\par \par Plan:\par - continue Effexor XR 225mg (150mg + 75mg) to target anxiety and depression.\par - continue Quetiapine Extended Release  400mg PO QHS.\par - stopped Topiramate to 50mg BID for weight gain\par - trial Metformin 500mg q daily for weightgain \par - Will discuss with patient options for stimulants in future, pending collateral from family and teacher for possible dx of ADHD\par - Defer Gabapentin 300mg BID to patient's other doctors to target her pain symptoms.\par - Continue Hydroxyzine 25mg PO BID PRN for anxiety.\par - Follow up in 1 month\par - Patient aware of providers family leave, coverage and emergency protocol.\par - Continue with school therapist and support group.

## 2022-12-16 NOTE — SOCIAL HISTORY
[FreeTextEntry1] : 2 previous engagements, in same-sex relationship since 10/2018. Currently unemployed, Former [employer] ; Studying for bachelors in psychology and minor in women and gender studies, to graduate 2023. Mom in [place], Dad in [place]. Has 7 siblings, raised 5 siblings. Has friends. currently working as pharmacy tech. Domiciled with grandmother in [facility]. \par \par Description: medicinal marijuana for PTSD; 0.5 cartridge/month with daily use
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
## 2023-01-04 ENCOUNTER — APPOINTMENT (OUTPATIENT)
Dept: PEDIATRIC ALLERGY IMMUNOLOGY | Facility: CLINIC | Age: 28
End: 2023-01-04
Payer: MEDICAID

## 2023-01-04 VITALS — BODY MASS INDEX: 34.27 KG/M2 | WEIGHT: 170 LBS | HEIGHT: 59 IN

## 2023-01-04 DIAGNOSIS — T78.01XD ANAPHYLACTIC REACTION DUE TO PEANUTS, SUBSEQUENT ENCOUNTER: ICD-10-CM

## 2023-01-04 DIAGNOSIS — T78.05XD ANAPHYLACTIC REACTION DUE TO TREE NUTS AND SEEDS, SUBSEQUENT ENCOUNTER: ICD-10-CM

## 2023-01-04 PROCEDURE — 99213 OFFICE O/P EST LOW 20 MIN: CPT

## 2023-01-04 NOTE — HISTORY OF PRESENT ILLNESS
[None] : The patient is currently asymptomatic [de-identified] : AUGUSTO REYNOSO is a 27 year old female with a history of mouth, tongue and jaw numbness, itchy throat, wheezing and tightness of the chest when having a nut bar two weeks ago. She took  two Benadryl and went to ER which they gave Dexamethasone and monitored her and sent her home. She did well. Two to three days later she had the same type of reaction and saw her PCP Wednesday last week, she gave Prednisone 20 mg a day for a week plus Benadryl every four hours and recommended allergy evaluation.  \par \par Going back years she used to have upper respiratory allergies, she gets them in the fall and winter months, she also had asthma periodically, always treated and released as an outpatient. Her asthma is aggravated by cold air and exercise. No steady follow up by anyone. \par \par \par \par She was staying away from tree nuts and peanuts for quite some time. 2-3 weeks ago she had some vanilla ice cream with m & ms on top as well as caramel. At the same time she had some cookies with almonds, walnuts and cashews. Within 15-30 minutes she began to feel hot sensation, itchy chest, throat, lumpy feeling in her throat and numbness of her tongue and lip. She took Benadryl and rushed to the ER where they administered Dexamethasone, observed for 4 hours. Her PCP gave her Prednisone 20mg daily for a week. I had  ordered screening [employer] for various peanuts an Tree nuts which was essentially negative despite her reactions. She has an EpiPen and knows how to use it. I had also recommended Zyrtec 10 mg daily. She has been doing well. She had recent blood work done and was put on Metformin 500 mg daily.

## 2023-01-10 ENCOUNTER — EMERGENCY (EMERGENCY)
Facility: HOSPITAL | Age: 28
LOS: 0 days | Discharge: HOME | End: 2023-01-10
Attending: STUDENT IN AN ORGANIZED HEALTH CARE EDUCATION/TRAINING PROGRAM | Admitting: STUDENT IN AN ORGANIZED HEALTH CARE EDUCATION/TRAINING PROGRAM
Payer: MEDICAID

## 2023-01-10 VITALS
SYSTOLIC BLOOD PRESSURE: 129 MMHG | DIASTOLIC BLOOD PRESSURE: 63 MMHG | OXYGEN SATURATION: 99 % | RESPIRATION RATE: 16 BRPM | HEART RATE: 84 BPM | TEMPERATURE: 99 F

## 2023-01-10 VITALS
DIASTOLIC BLOOD PRESSURE: 64 MMHG | RESPIRATION RATE: 18 BRPM | HEART RATE: 87 BPM | SYSTOLIC BLOOD PRESSURE: 113 MMHG | OXYGEN SATURATION: 99 %

## 2023-01-10 DIAGNOSIS — R11.0 NAUSEA: ICD-10-CM

## 2023-01-10 DIAGNOSIS — F43.10 POST-TRAUMATIC STRESS DISORDER, UNSPECIFIED: ICD-10-CM

## 2023-01-10 DIAGNOSIS — F32.A DEPRESSION, UNSPECIFIED: ICD-10-CM

## 2023-01-10 DIAGNOSIS — Z88.0 ALLERGY STATUS TO PENICILLIN: ICD-10-CM

## 2023-01-10 DIAGNOSIS — R53.1 WEAKNESS: ICD-10-CM

## 2023-01-10 DIAGNOSIS — R19.7 DIARRHEA, UNSPECIFIED: ICD-10-CM

## 2023-01-10 DIAGNOSIS — Z79.84 LONG TERM (CURRENT) USE OF ORAL HYPOGLYCEMIC DRUGS: ICD-10-CM

## 2023-01-10 DIAGNOSIS — Z91.018 ALLERGY TO OTHER FOODS: ICD-10-CM

## 2023-01-10 DIAGNOSIS — F41.9 ANXIETY DISORDER, UNSPECIFIED: ICD-10-CM

## 2023-01-10 DIAGNOSIS — R73.03 PREDIABETES: ICD-10-CM

## 2023-01-10 DIAGNOSIS — K58.9 IRRITABLE BOWEL SYNDROME WITHOUT DIARRHEA: ICD-10-CM

## 2023-01-10 DIAGNOSIS — Z88.3 ALLERGY STATUS TO OTHER ANTI-INFECTIVE AGENTS: ICD-10-CM

## 2023-01-10 DIAGNOSIS — Z88.1 ALLERGY STATUS TO OTHER ANTIBIOTIC AGENTS STATUS: ICD-10-CM

## 2023-01-10 LAB
ALBUMIN SERPL ELPH-MCNC: 4.9 G/DL — SIGNIFICANT CHANGE UP (ref 3.5–5.2)
ALP SERPL-CCNC: 103 U/L — SIGNIFICANT CHANGE UP (ref 30–115)
ALT FLD-CCNC: 15 U/L — SIGNIFICANT CHANGE UP (ref 0–41)
ANION GAP SERPL CALC-SCNC: 7 MMOL/L — SIGNIFICANT CHANGE UP (ref 7–14)
AST SERPL-CCNC: 17 U/L — SIGNIFICANT CHANGE UP (ref 0–41)
BASOPHILS # BLD AUTO: 0.05 K/UL — SIGNIFICANT CHANGE UP (ref 0–0.2)
BASOPHILS NFR BLD AUTO: 0.5 % — SIGNIFICANT CHANGE UP (ref 0–1)
BILIRUB SERPL-MCNC: 0.2 MG/DL — SIGNIFICANT CHANGE UP (ref 0.2–1.2)
BUN SERPL-MCNC: 7 MG/DL — LOW (ref 10–20)
CALCIUM SERPL-MCNC: 9.6 MG/DL — SIGNIFICANT CHANGE UP (ref 8.4–10.5)
CHLORIDE SERPL-SCNC: 102 MMOL/L — SIGNIFICANT CHANGE UP (ref 98–110)
CO2 SERPL-SCNC: 27 MMOL/L — SIGNIFICANT CHANGE UP (ref 17–32)
CREAT SERPL-MCNC: 0.7 MG/DL — SIGNIFICANT CHANGE UP (ref 0.7–1.5)
EGFR: 120 ML/MIN/1.73M2 — SIGNIFICANT CHANGE UP
EOSINOPHIL # BLD AUTO: 0.39 K/UL — SIGNIFICANT CHANGE UP (ref 0–0.7)
EOSINOPHIL NFR BLD AUTO: 4.2 % — SIGNIFICANT CHANGE UP (ref 0–8)
GLUCOSE SERPL-MCNC: 96 MG/DL — SIGNIFICANT CHANGE UP (ref 70–99)
HCG SERPL QL: NEGATIVE — SIGNIFICANT CHANGE UP
HCT VFR BLD CALC: 43 % — SIGNIFICANT CHANGE UP (ref 37–47)
HGB BLD-MCNC: 14.4 G/DL — SIGNIFICANT CHANGE UP (ref 12–16)
IMM GRANULOCYTES NFR BLD AUTO: 0.2 % — SIGNIFICANT CHANGE UP (ref 0.1–0.3)
LYMPHOCYTES # BLD AUTO: 2.02 K/UL — SIGNIFICANT CHANGE UP (ref 1.2–3.4)
LYMPHOCYTES # BLD AUTO: 21.6 % — SIGNIFICANT CHANGE UP (ref 20.5–51.1)
MAGNESIUM SERPL-MCNC: 2.2 MG/DL — SIGNIFICANT CHANGE UP (ref 1.8–2.4)
MCHC RBC-ENTMCNC: 27.5 PG — SIGNIFICANT CHANGE UP (ref 27–31)
MCHC RBC-ENTMCNC: 33.5 G/DL — SIGNIFICANT CHANGE UP (ref 32–37)
MCV RBC AUTO: 82.2 FL — SIGNIFICANT CHANGE UP (ref 81–99)
MONOCYTES # BLD AUTO: 0.66 K/UL — HIGH (ref 0.1–0.6)
MONOCYTES NFR BLD AUTO: 7 % — SIGNIFICANT CHANGE UP (ref 1.7–9.3)
NEUTROPHILS # BLD AUTO: 6.23 K/UL — SIGNIFICANT CHANGE UP (ref 1.4–6.5)
NEUTROPHILS NFR BLD AUTO: 66.5 % — SIGNIFICANT CHANGE UP (ref 42.2–75.2)
NRBC # BLD: 0 /100 WBCS — SIGNIFICANT CHANGE UP (ref 0–0)
PLATELET # BLD AUTO: 340 K/UL — SIGNIFICANT CHANGE UP (ref 130–400)
POTASSIUM SERPL-MCNC: 4.8 MMOL/L — SIGNIFICANT CHANGE UP (ref 3.5–5)
POTASSIUM SERPL-SCNC: 4.8 MMOL/L — SIGNIFICANT CHANGE UP (ref 3.5–5)
PROT SERPL-MCNC: 7.5 G/DL — SIGNIFICANT CHANGE UP (ref 6–8)
RBC # BLD: 5.23 M/UL — SIGNIFICANT CHANGE UP (ref 4.2–5.4)
RBC # FLD: 12.9 % — SIGNIFICANT CHANGE UP (ref 11.5–14.5)
SODIUM SERPL-SCNC: 136 MMOL/L — SIGNIFICANT CHANGE UP (ref 135–146)
WBC # BLD: 9.37 K/UL — SIGNIFICANT CHANGE UP (ref 4.8–10.8)
WBC # FLD AUTO: 9.37 K/UL — SIGNIFICANT CHANGE UP (ref 4.8–10.8)

## 2023-01-10 PROCEDURE — 99284 EMERGENCY DEPT VISIT MOD MDM: CPT

## 2023-01-10 PROCEDURE — 93010 ELECTROCARDIOGRAM REPORT: CPT

## 2023-01-10 RX ORDER — ONDANSETRON 8 MG/1
1 TABLET, FILM COATED ORAL
Qty: 6 | Refills: 0
Start: 2023-01-10 | End: 2023-01-11

## 2023-01-10 RX ORDER — SODIUM CHLORIDE 9 MG/ML
1000 INJECTION INTRAMUSCULAR; INTRAVENOUS; SUBCUTANEOUS ONCE
Refills: 0 | Status: COMPLETED | OUTPATIENT
Start: 2023-01-10 | End: 2023-01-10

## 2023-01-10 RX ORDER — ONDANSETRON 8 MG/1
4 TABLET, FILM COATED ORAL ONCE
Refills: 0 | Status: COMPLETED | OUTPATIENT
Start: 2023-01-10 | End: 2023-01-10

## 2023-01-10 RX ADMIN — SODIUM CHLORIDE 1000 MILLILITER(S): 9 INJECTION INTRAMUSCULAR; INTRAVENOUS; SUBCUTANEOUS at 17:51

## 2023-01-10 RX ADMIN — ONDANSETRON 4 MILLIGRAM(S): 8 TABLET, FILM COATED ORAL at 19:46

## 2023-01-10 NOTE — ED PROVIDER NOTE - CONSIDERATION OF ADMISSION OBSERVATION
ED observation period for 5+ hours to assess for change to sx Consideration of Admission/Observation

## 2023-01-10 NOTE — ED PROVIDER NOTE - DIFFERENTIAL DIAGNOSIS
Differential Diagnosis early viral illness, r/o lyte abnormality, r/o rodolfo, possible med side effect, possible orthostatic lightheadedness   no e/o serotonin syndrome

## 2023-01-10 NOTE — ED PROVIDER NOTE - TOBACCO USE
Patient discharged A&OX4, ambulatory with a steady gait. Patient verbalized understanding of discharge instructions and follow up care.    Never smoker

## 2023-01-10 NOTE — ED PROVIDER NOTE - NS ED ROS FT
Constitutional: No fever, chills.  Eyes:  No visual changes  ENMT:  No neck pain  Cardiac:  No chest pain  Respiratory:  No cough, SOB  GI:  (+) nausea. no vomiting, diarrhea, abdominal pain.  :  No dysuria, hematuria  MS:  No back pain.  Neuro:  No headache or lightheadedness  Skin:  No skin rash  Endocrine: No history of thyroid disease or diabetes.  Except as documented in the HPI,  all other systems are negative.

## 2023-01-10 NOTE — ED PROVIDER NOTE - OBJECTIVE STATEMENT
26 yo F pmhx PTSD, anxiety, depression, pre-dm (on metformin 500mg at bedtime) presenting to the ED for eval, reporting this am felt "shaky", nauseous, cold sweats, "brain cloudy". pt states last night she fell asleep and forgot to take her seroquel. denies any fever, chills, chest pain, sob, vomiting, abd pain.

## 2023-01-10 NOTE — ED PROVIDER NOTE - ATTENDING APP SHARED VISIT CONTRIBUTION OF CARE
28 yo f, hx anxiety, depression, ptsd, pre-dm (recently started on metformin 500), IBS, on seroquel  pt states on New Years- pt felt shaky, nauseous, had diarrhea, generalized weakness. pt was told she had a stomach bug.  pt states last night, she did not take her seroquel. pt states she has not eaten as much recently. shakiness more when she stands up/walks.    vss  gen- NAD, aaox3  card-rrr  lungs-ctab, no wheezing or rhonchi  abd-sntnd, no guarding or rebound  neuro- full str/sensation, cn ii-xii grossly intact, normal coordination    will get screening labs, hydration  will provide supportive care, serial exam and ED observation period 28 yo f, hx anxiety, depression, ptsd, pre-dm (recently started on metformin 500), IBS, on seroquel  pt states on New Years- pt felt shaky, nauseous, had diarrhea, generalized weakness. pt was told she had a stomach bug.  pt states last night, she did not take her seroquel. pt states she has not eaten as much recently. shakiness more when she stands up/walks.    vss  gen- NAD, aaox3  Eyes- EOMI/PERRL b/l, no nystagmus b/l   card-rrr  lungs-ctab, no wheezing or rhonchi  abd-sntnd, no guarding or rebound  neuro- full str/sensation, cn ii-xii grossly intact, normal coordination, gait steady w/ normal balance, normal tone    will get screening labs, hydration  will provide supportive care, serial exam and ED observation period

## 2023-01-10 NOTE — ED ADULT NURSE NOTE - OBJECTIVE STATEMENT
States didn't take her metformin today and feels tired. States she doesn't have DM and not sure why she has to take Metformin. C/o blurry vision.

## 2023-01-10 NOTE — ED PROVIDER NOTE - CLINICAL SUMMARY MEDICAL DECISION MAKING FREE TEXT BOX
Throughout ED observation period, pt remained clinically and hemodynamically stable.  labs w/o acute findings  serial neuro exams nml  pt tolerating PO in ED  will dc w/ home care instructions, return precautions
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
## 2023-01-10 NOTE — ED PROVIDER NOTE - PATIENT PORTAL LINK FT
You can access the FollowMyHealth Patient Portal offered by [facility] by registering at the following website: [email or web address]. By joining [employer]’s FollowMyHealth portal, you will also be able to view your health information using other applications (apps) compatible with our system.

## 2023-01-10 NOTE — ED PROVIDER NOTE - NSFOLLOWUPINSTRUCTIONS_ED_ALL_ED_FT
Nausea, Adult  Nausea is the feeling that you have an upset stomach or that you are about to vomit. Nausea on its own is not usually a serious concern, but it may be an early sign of a more serious medical problem. As nausea gets worse, it can lead to vomiting. If vomiting develops, or if you are not able to drink enough fluids, you are at risk of becoming dehydrated. Dehydration can make you tired and thirsty, cause you to have a dry mouth, and decrease how often you urinate. Older adults and people with other diseases or a weak disease-fighting system (immune system) are at higher risk for dehydration. The main goals of treating your nausea are:  To relieve your nausea.To limit repeated nausea episodes.To prevent vomiting and dehydration.Follow these instructions at home:  Watch your symptoms for any changes. Tell your health care provider about them. Follow these instructions as told by your health care provider.  Eating and drinking         Take an oral rehydration solution (ORS). This is a drink that is sold at pharmacies and retail stores.Drink clear fluids slowly and in small amounts as you are able. Clear fluids include water, ice chips, low-calorie sports drinks, and fruit juice that has water added (diluted fruit juice).Eat bland, easy-to-digest foods in small amounts as you are able. These foods include bananas, applesauce, rice, lean meats, toast, and crackers.Avoid drinking fluids that contain a lot of sugar or caffeine, such as energy drinks, sports drinks, and soda.Avoid alcohol.Avoid spicy or fatty foods.General instructions     Take over-the-counter and prescription medicines only as told by your health care provider.Rest at home while you recover.Drink enough fluid to keep your urine pale yellow.Breathe slowly and deeply when you feel nauseous.Avoid smelling things that have strong odors.Wash your hands often using soap and water. If soap and water are not available, use hand .Make sure that all people in your household wash their hands well and often.Keep all follow-up visits as told by your health care provider. This is important.Contact a health care provider if:  Your nausea gets worse.Your nausea does not go away after two days.You vomit.You cannot drink fluids without vomiting.You have any of the following:  New symptoms.A fever.A headache.Muscle cramps.A rash.Pain while urinating.You feel light-headed or dizzy.Get help right away if:  You have pain in your chest, neck, arm, or jaw.You feel extremely weak or you faint.You have vomit that is bright red or looks like coffee grounds.You have bloody or black stools or stools that look like tar.You have a severe headache, a stiff neck, or both.You have severe pain, cramping, or bloating in your abdomen.You have difficulty breathing or are breathing very quickly.Your heart is beating very quickly.Your skin feels cold and clammy.You feel confused.You have signs of dehydration, such as:  Dark urine, very little urine, or no urine.Cracked lips.Dry mouth.Sunken eyes.Sleepiness.Weakness.These symptoms may represent a serious problem that is an emergency. Do not wait to see if the symptoms will go away. Get medical help right away. Call your local emergency services (911 in the [place] Do not drive yourself to the hospital.   Summary  Nausea is the feeling that you have an upset stomach or that you are about to vomit. Nausea on its own is not usually a serious concern, but it may be an early sign of a more serious medical problem.If vomiting develops, or if you are not able to drink enough fluids, you are at risk of becoming dehydrated.Follow recommendations for eating and drinking and take over-the-counter and prescription medicines only as told by your health care provider.Contact a health care provider right away if your symptoms worsen or you have new symptoms.Keep all follow-up visits as told by your health care provider. This is important.This information is not intended to replace advice given to you by your health care provider. Make sure you discuss any questions you have with your health care provider.

## 2023-01-17 ENCOUNTER — OUTPATIENT (OUTPATIENT)
Dept: OUTPATIENT SERVICES | Facility: HOSPITAL | Age: 28
LOS: 1 days | Discharge: HOME | End: 2023-01-17

## 2023-01-17 ENCOUNTER — APPOINTMENT (OUTPATIENT)
Dept: PSYCHIATRY | Facility: CLINIC | Age: 28
End: 2023-01-17

## 2023-01-17 DIAGNOSIS — F60.3 BORDERLINE PERSONALITY DISORDER: ICD-10-CM

## 2023-01-17 DIAGNOSIS — F32.A DEPRESSION, UNSPECIFIED: ICD-10-CM

## 2023-01-17 DIAGNOSIS — F43.10 POST-TRAUMATIC STRESS DISORDER, UNSPECIFIED: ICD-10-CM

## 2023-01-17 RX ORDER — TOPIRAMATE 50 MG/1
50 TABLET, FILM COATED ORAL DAILY
Qty: 30 | Refills: 1 | Status: COMPLETED | COMMUNITY
Start: 2022-05-10 | End: 2023-01-17

## 2023-01-17 RX ORDER — METFORMIN HYDROCHLORIDE 500 MG/1
500 TABLET, COATED ORAL DAILY
Qty: 30 | Refills: 2 | Status: COMPLETED | COMMUNITY
Start: 2022-12-16 | End: 2023-01-17

## 2023-01-17 NOTE — PLAN
[Medication education provided] : Medication education provided. [Rationale for medication choices, possible risks/precautions, benefits, alternative treatment choices, and consequences of non-treatment discussed] : Rationale for medication choices, possible risks/precautions, benefits, alternative treatment choices, and consequences of non-treatment discussed with patient/family/caregiver  [No] : No [FreeTextEntry4] : To keep bpd, depression, ptsd and anxiety under control [FreeTextEntry5] : - continue Effexor XR 225mg po daily (150mg + 75mg) to target anxiety and depression\par - continue Quetiapine extended release 400mg PO QHS\par - stopped Metformin 500mg po daily for weight gain, will f/u with endocrinologist for alternative\par - Will discuss with patient options for stimulants in future, pending collateral from family and teacher for possible dx of ADHD\par - Defer Gabapentin 300mg po BID to patient's other doctors to target her pain symptoms\par - Continue Hydroxyzine 25mg PO BID PRN for anxiety\par - Continue with school therapist and support group\par - Follow up in 1 month

## 2023-01-17 NOTE — PHYSICAL EXAM
[Cooperative] : cooperative [Full] : full [Clear] : clear [Linear/Goal Directed] : linear/goal directed [None Reported] : none reported [WNL] : within normal limits [Average] : average [None] : none [FreeTextEntry1] : colorful hair, casually dressed and groomed [FreeTextEntry8] : "okay" [de-identified] : fair [de-identified] : fair
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
## 2023-01-17 NOTE — REASON FOR VISIT
[Patient preference] : as per patient preference [Telehealth (audio & video) - Individual/Group] : This visit was provided via telehealth using real-time 2-way audio visual technology. [Medical Office: ([facility])___] : The provider was located at the medical office in [unfilled]. [Home] : The patient, [unfilled], was located at home, [unfilled], at the time of the visit. [Patient] : Patient [FreeTextEntry1] : Med management

## 2023-01-17 NOTE — SOCIAL HISTORY
[FreeTextEntry1] : 2 previous engagements, in same-sex relationship since 10/2018. Currently unemployed, Former [employer] ; Studying for bachelors in psychology and minor in women and gender studies, to graduate 2023. Mom in [place], Dad in [place]. Has 7 siblings, raised 5 siblings. Has friends. currently working as pharmacy tech. Domiciled with grandmother in [facility]. \par \par Description: medicinal marijuana for PTSD; 0.5 cartridge/month with daily use

## 2023-01-17 NOTE — DISCUSSION/SUMMARY
[FreeTextEntry1] : Patient is a 26 y/o F (identifies as "Julio," non-binary, they/them pronouns), recently single, domiciled with grandmother, with PMH of heart murmur with atrial septal defect (benign), PPH of borderline personality disorder, PTSD, obsessive compulsive sx, with 2 prior IPP admissions and 1 prior suicide attempt at age 15 via pill ingestion, current medical marijuana use for PTSD, presenting for follow up examination.\par \par Today, patient reported intolerable adverse effects with metformin which they stopped taking, though plan to follow-up with endocrinologist to find alternative. They report that they continue using their coping skills and denied si and hi. pt was future oriented and linear in thought. Currently the patient was not actively suicidal, homicidal, psychotic or manic during the interview. \par \par Risk assessment: Risk factors include history of suicide attempt, personality disorder diagnosis, PTSD symptoms, depressive symptoms, SIB; protective factors include no acute suicidality, future oriented towards completing coursework, social support from family/friends, engaged and compliant with treatment. Patient is therefore low acute suicide risk despite her chronically elevated risk.

## 2023-02-06 ENCOUNTER — EMERGENCY (EMERGENCY)
Facility: HOSPITAL | Age: 28
LOS: 0 days | Discharge: HOME | End: 2023-02-06
Attending: EMERGENCY MEDICINE
Payer: MEDICAID

## 2023-02-06 VITALS
SYSTOLIC BLOOD PRESSURE: 120 MMHG | RESPIRATION RATE: 18 BRPM | TEMPERATURE: 99 F | DIASTOLIC BLOOD PRESSURE: 82 MMHG | HEART RATE: 100 BPM | OXYGEN SATURATION: 100 %

## 2023-02-06 DIAGNOSIS — M54.50 LOW BACK PAIN, UNSPECIFIED: ICD-10-CM

## 2023-02-06 DIAGNOSIS — Z88.0 ALLERGY STATUS TO PENICILLIN: ICD-10-CM

## 2023-02-06 DIAGNOSIS — Z88.1 ALLERGY STATUS TO OTHER ANTIBIOTIC AGENTS STATUS: ICD-10-CM

## 2023-02-06 DIAGNOSIS — F43.10 POST-TRAUMATIC STRESS DISORDER, UNSPECIFIED: ICD-10-CM

## 2023-02-06 DIAGNOSIS — Z91.018 ALLERGY TO OTHER FOODS: ICD-10-CM

## 2023-02-06 DIAGNOSIS — K58.9 IRRITABLE BOWEL SYNDROME WITHOUT DIARRHEA: ICD-10-CM

## 2023-02-06 DIAGNOSIS — F32.A DEPRESSION, UNSPECIFIED: ICD-10-CM

## 2023-02-06 DIAGNOSIS — R10.32 LEFT LOWER QUADRANT PAIN: ICD-10-CM

## 2023-02-06 DIAGNOSIS — N39.0 URINARY TRACT INFECTION, SITE NOT SPECIFIED: ICD-10-CM

## 2023-02-06 DIAGNOSIS — F60.3 BORDERLINE PERSONALITY DISORDER: ICD-10-CM

## 2023-02-06 DIAGNOSIS — R10.31 RIGHT LOWER QUADRANT PAIN: ICD-10-CM

## 2023-02-06 LAB
ALBUMIN SERPL ELPH-MCNC: 4.4 G/DL — SIGNIFICANT CHANGE UP (ref 3.5–5.2)
ALP SERPL-CCNC: 101 U/L — SIGNIFICANT CHANGE UP (ref 30–115)
ALT FLD-CCNC: 14 U/L — SIGNIFICANT CHANGE UP (ref 0–41)
ANION GAP SERPL CALC-SCNC: 10 MMOL/L — SIGNIFICANT CHANGE UP (ref 7–14)
APPEARANCE UR: ABNORMAL
AST SERPL-CCNC: 15 U/L — SIGNIFICANT CHANGE UP (ref 0–41)
BACTERIA # UR AUTO: NEGATIVE — SIGNIFICANT CHANGE UP
BASOPHILS # BLD AUTO: 0.04 K/UL — SIGNIFICANT CHANGE UP (ref 0–0.2)
BASOPHILS NFR BLD AUTO: 0.5 % — SIGNIFICANT CHANGE UP (ref 0–1)
BILIRUB SERPL-MCNC: 0.3 MG/DL — SIGNIFICANT CHANGE UP (ref 0.2–1.2)
BILIRUB UR-MCNC: NEGATIVE — SIGNIFICANT CHANGE UP
BUN SERPL-MCNC: 8 MG/DL — LOW (ref 10–20)
CALCIUM SERPL-MCNC: 9.4 MG/DL — SIGNIFICANT CHANGE UP (ref 8.4–10.5)
CHLORIDE SERPL-SCNC: 104 MMOL/L — SIGNIFICANT CHANGE UP (ref 98–110)
CO2 SERPL-SCNC: 27 MMOL/L — SIGNIFICANT CHANGE UP (ref 17–32)
COLOR SPEC: YELLOW — SIGNIFICANT CHANGE UP
CREAT SERPL-MCNC: 0.7 MG/DL — SIGNIFICANT CHANGE UP (ref 0.7–1.5)
DIFF PNL FLD: ABNORMAL
EGFR: 120 ML/MIN/1.73M2 — SIGNIFICANT CHANGE UP
EOSINOPHIL # BLD AUTO: 0.32 K/UL — SIGNIFICANT CHANGE UP (ref 0–0.7)
EOSINOPHIL NFR BLD AUTO: 3.7 % — SIGNIFICANT CHANGE UP (ref 0–8)
EPI CELLS # UR: 2 /HPF — SIGNIFICANT CHANGE UP (ref 0–5)
GLUCOSE SERPL-MCNC: 91 MG/DL — SIGNIFICANT CHANGE UP (ref 70–99)
GLUCOSE UR QL: NEGATIVE — SIGNIFICANT CHANGE UP
HCG SERPL QL: NEGATIVE — SIGNIFICANT CHANGE UP
HCT VFR BLD CALC: 41.4 % — SIGNIFICANT CHANGE UP (ref 37–47)
HGB BLD-MCNC: 13.9 G/DL — SIGNIFICANT CHANGE UP (ref 12–16)
HYALINE CASTS # UR AUTO: 4 /LPF — SIGNIFICANT CHANGE UP (ref 0–7)
IMM GRANULOCYTES NFR BLD AUTO: 0.2 % — SIGNIFICANT CHANGE UP (ref 0.1–0.3)
KETONES UR-MCNC: NEGATIVE — SIGNIFICANT CHANGE UP
LEUKOCYTE ESTERASE UR-ACNC: ABNORMAL
LIDOCAIN IGE QN: 36 U/L — SIGNIFICANT CHANGE UP (ref 7–60)
LYMPHOCYTES # BLD AUTO: 1.78 K/UL — SIGNIFICANT CHANGE UP (ref 1.2–3.4)
LYMPHOCYTES # BLD AUTO: 20.6 % — SIGNIFICANT CHANGE UP (ref 20.5–51.1)
MCHC RBC-ENTMCNC: 27.8 PG — SIGNIFICANT CHANGE UP (ref 27–31)
MCHC RBC-ENTMCNC: 33.6 G/DL — SIGNIFICANT CHANGE UP (ref 32–37)
MCV RBC AUTO: 82.8 FL — SIGNIFICANT CHANGE UP (ref 81–99)
MONOCYTES # BLD AUTO: 0.51 K/UL — SIGNIFICANT CHANGE UP (ref 0.1–0.6)
MONOCYTES NFR BLD AUTO: 5.9 % — SIGNIFICANT CHANGE UP (ref 1.7–9.3)
NEUTROPHILS # BLD AUTO: 5.96 K/UL — SIGNIFICANT CHANGE UP (ref 1.4–6.5)
NEUTROPHILS NFR BLD AUTO: 69.1 % — SIGNIFICANT CHANGE UP (ref 42.2–75.2)
NITRITE UR-MCNC: NEGATIVE — SIGNIFICANT CHANGE UP
NRBC # BLD: 0 /100 WBCS — SIGNIFICANT CHANGE UP (ref 0–0)
PH UR: 6 — SIGNIFICANT CHANGE UP (ref 5–8)
PLATELET # BLD AUTO: 284 K/UL — SIGNIFICANT CHANGE UP (ref 130–400)
POTASSIUM SERPL-MCNC: 3.8 MMOL/L — SIGNIFICANT CHANGE UP (ref 3.5–5)
POTASSIUM SERPL-SCNC: 3.8 MMOL/L — SIGNIFICANT CHANGE UP (ref 3.5–5)
PROT SERPL-MCNC: 7.2 G/DL — SIGNIFICANT CHANGE UP (ref 6–8)
PROT UR-MCNC: SIGNIFICANT CHANGE UP
RBC # BLD: 5 M/UL — SIGNIFICANT CHANGE UP (ref 4.2–5.4)
RBC # FLD: 13.1 % — SIGNIFICANT CHANGE UP (ref 11.5–14.5)
RBC CASTS # UR COMP ASSIST: 4 /HPF — SIGNIFICANT CHANGE UP (ref 0–4)
SODIUM SERPL-SCNC: 141 MMOL/L — SIGNIFICANT CHANGE UP (ref 135–146)
SP GR SPEC: 1.01 — SIGNIFICANT CHANGE UP (ref 1.01–1.03)
UROBILINOGEN FLD QL: SIGNIFICANT CHANGE UP
WBC # BLD: 8.63 K/UL — SIGNIFICANT CHANGE UP (ref 4.8–10.8)
WBC # FLD AUTO: 8.63 K/UL — SIGNIFICANT CHANGE UP (ref 4.8–10.8)
WBC UR QL: 127 /HPF — HIGH (ref 0–5)

## 2023-02-06 PROCEDURE — 87086 URINE CULTURE/COLONY COUNT: CPT

## 2023-02-06 PROCEDURE — 74177 CT ABD & PELVIS W/CONTRAST: CPT | Mod: MA

## 2023-02-06 PROCEDURE — 81001 URINALYSIS AUTO W/SCOPE: CPT

## 2023-02-06 PROCEDURE — 76830 TRANSVAGINAL US NON-OB: CPT | Mod: 26

## 2023-02-06 PROCEDURE — 99284 EMERGENCY DEPT VISIT MOD MDM: CPT | Mod: 25

## 2023-02-06 PROCEDURE — 36415 COLL VENOUS BLD VENIPUNCTURE: CPT

## 2023-02-06 PROCEDURE — 76830 TRANSVAGINAL US NON-OB: CPT

## 2023-02-06 PROCEDURE — 74177 CT ABD & PELVIS W/CONTRAST: CPT | Mod: 26,MA

## 2023-02-06 PROCEDURE — 85025 COMPLETE CBC W/AUTO DIFF WBC: CPT

## 2023-02-06 PROCEDURE — 76937 US GUIDE VASCULAR ACCESS: CPT | Mod: 26

## 2023-02-06 PROCEDURE — 83690 ASSAY OF LIPASE: CPT

## 2023-02-06 PROCEDURE — 36000 PLACE NEEDLE IN VEIN: CPT

## 2023-02-06 PROCEDURE — 84703 CHORIONIC GONADOTROPIN ASSAY: CPT

## 2023-02-06 PROCEDURE — 80053 COMPREHEN METABOLIC PANEL: CPT

## 2023-02-06 RX ORDER — NITROFURANTOIN MACROCRYSTAL 50 MG
1 CAPSULE ORAL
Qty: 10 | Refills: 0
Start: 2023-02-06 | End: 2023-02-10

## 2023-02-06 RX ORDER — SODIUM CHLORIDE 9 MG/ML
1000 INJECTION INTRAMUSCULAR; INTRAVENOUS; SUBCUTANEOUS ONCE
Refills: 0 | Status: COMPLETED | OUTPATIENT
Start: 2023-02-06 | End: 2023-02-06

## 2023-02-06 RX ORDER — IBUPROFEN 200 MG
600 TABLET ORAL ONCE
Refills: 0 | Status: COMPLETED | OUTPATIENT
Start: 2023-02-06 | End: 2023-02-06

## 2023-02-06 RX ORDER — METHOCARBAMOL 500 MG/1
750 TABLET, FILM COATED ORAL ONCE
Refills: 0 | Status: COMPLETED | OUTPATIENT
Start: 2023-02-06 | End: 2023-02-06

## 2023-02-06 RX ADMIN — METHOCARBAMOL 750 MILLIGRAM(S): 500 TABLET, FILM COATED ORAL at 19:41

## 2023-02-06 RX ADMIN — SODIUM CHLORIDE 1000 MILLILITER(S): 9 INJECTION INTRAMUSCULAR; INTRAVENOUS; SUBCUTANEOUS at 19:41

## 2023-02-06 RX ADMIN — Medication 600 MILLIGRAM(S): at 19:41

## 2023-02-06 NOTE — ED PROVIDER NOTE - CONSIDERATION OF ADMISSION OBSERVATION
Consideration of Admission/Observation Patient with negative work up in ED, feeling better, tolerating PO, serial abdominal exams benign, no indication for admission at this time.

## 2023-02-06 NOTE — ED PROVIDER NOTE - DIFFERENTIAL DIAGNOSIS
Differential Diagnosis Abdominal pain r/o GYN pathology vs obstruction, perforation, abscess, appendicitis, ischemia, hepatobiliary abnormality or any other emergent intra-abdominal pathology.

## 2023-02-06 NOTE — ED PROVIDER NOTE - CLINICAL SUMMARY MEDICAL DECISION MAKING FREE TEXT BOX
Patient presented with abdominal pain as documenteed. (+) tender on exam but otherwise afebrile, HD stable, well appearing. Obtained labs which were grossly unremarkable including no significant leukocytosis, anemia, signs of dehydration/BRENT, transaminitis or significant electrolyte abnormalities. UA showed (+) likely UTI. CT abd/pelvis and transvaginal US negative for emergent processes. Patient felt much better after tx in ED, and serial abdominal exams benign. Able to tolerate PO. Given the above, will discharge home with PO abx for UTI, outpatient follow up. Patient agreeable with plan. Agrees to return to ED for any new or worsening symptoms.

## 2023-02-06 NOTE — ED PROVIDER NOTE - PATIENT PORTAL LINK FT
You can access the FollowMyHealth Patient Portal offered by [facility] by registering at the following website: [email or web address]. By joining [employer]’s FollowMyHealth portal, you will also be able to view your health information using other applications (apps) compatible with our system.

## 2023-02-06 NOTE — ED PROVIDER NOTE - PHYSICAL EXAMINATION
VITAL SIGNS: I have reviewed nursing notes and confirm.  CONSTITUTIONAL: non-toxic, well appearing  SKIN: no rash, no petechiae.  EYES: PERRL, pink conjunctiva, anicteric  ENT: tongue midline, no exudates, MMM  NECK: Supple; no meningismus  CARD: RRR, no murmurs, equal radial pulses bilaterally 2+  RESP: CTAB, no respiratory distress  ABD: Soft,  non-distended, no peritoneal signs, no CVA tenderness, + b/l lower abd pain L>R, no rebound or guarding   EXT: No edema. No calves tenderness  NEURO: Alert, oriented. no focal deficits

## 2023-02-06 NOTE — ED PROVIDER NOTE - OBJECTIVE STATEMENT
27-year-old female with past medical history of IBS depression chronic back pain presents to the emergency department referred from urgent care to rule out appendicitis.  Patient reports she was at the mall and walking and suddenly developed right lower back pain that was radiating to the bilateral lower abdomen.  Pain is worse on the left lower abdomen than right.  Patient also notes some increased urinary frequency.  Patient is on birth control and is not sexually active, and denies any concerns for STD or STI.  Denies fevers chills chest pain difficulty breathing nausea vomiting diarrhea hematuria or dysuria.

## 2023-02-06 NOTE — ED PROVIDER NOTE - NSFOLLOWUPINSTRUCTIONS_ED_ALL_ED_FT
You were evaluated in the Emergency Department today, and your visit did not reveal anything immediately life-threatening.    However, it is important that you follow-up with your PRIMARY CARE PHYSICIAN within 3-5 days.    Antibiotics were sent to your pharmacy.    For pain, you may take the following over-the-counter medication(s):  - Acetaminophen (Tylenol, Midol) 650-1000 mg up to every 4-6 hours, as needed (max 4000mg/24hrs), AND/OR  - Ibuprofen (Motrin, Advil) 400 mg up to every 6-8 hours, as needed (max 3200mg/24hrs)    ---------------------------------------------------------------------------------------------------    Abdominal Pain    Many things can cause abdominal pain. Many times, abdominal pain is not caused by a disease and will improve without treatment. Your health care provider will do a physical exam to determine if there is a dangerous cause of your pain; blood tests and imaging may help determine the cause of your pain. However, in many cases, no cause may be found and you may need further testing as an outpatient. Monitor your abdominal pain for any changes.     SEEK IMMEDIATE MEDICAL CARE IF YOU HAVE ANY OF THE FOLLOWING SYMPTOMS: worsening abdominal pain, uncontrollable vomiting, profuse diarrhea, inability to have bowel movements or pass gas, black or bloody stools, fever accompanying chest pain or back pain, or fainting. These symptoms may represent a serious problem that is an emergency. Do not wait to see if the symptoms will go away. Get medical help right away. Call 911 and do not drive yourself to the hospital.    ---------------------------------------------------------------------------------------------------    Urinary Tract Infection    A urinary tract infection (UTI) is an infection of any part of the urinary tract, which includes the kidneys, ureters, bladder, and urethra. Risk factors include ignoring your need to urinate, wiping back to front if female, being an uncircumcised male, and having diabetes or a weak immune system. Symptoms include frequent urination, pain or burning with urination, foul smelling urine, cloudy urine, pain in the lower abdomen, blood in the urine, and fever. If you were prescribed an antibiotic medicine, take it as told by your health care provider. Do not stop taking the antibiotic even if you start to feel better.    SEEK IMMEDIATE MEDICAL CARE IF YOU HAVE ANY OF THE FOLLOWING SYMPTOMS: severe back or abdominal pain, fever, inability to keep fluids or medicine down, dizziness/lightheadedness, or a change in mental status.

## 2023-02-06 NOTE — ED PROVIDER NOTE - PROGRESS NOTE DETAILS
Resident AO: Patient received as signout from Dr. Townsend, pending IV, labs, CT scan, and US read. Not in acute distress. Resident AO: Results reviewed and discussed with patient. Will dc with abx for UTI. All questions answered, patient expressed understanding, and agreed with outpatient follow up. Return precautions given.

## 2023-02-08 LAB
CULTURE RESULTS: SIGNIFICANT CHANGE UP
SPECIMEN SOURCE: SIGNIFICANT CHANGE UP

## 2023-02-22 ENCOUNTER — APPOINTMENT (OUTPATIENT)
Dept: PSYCHIATRY | Facility: CLINIC | Age: 28
End: 2023-02-22
Payer: MEDICAID

## 2023-02-22 ENCOUNTER — OUTPATIENT (OUTPATIENT)
Dept: OUTPATIENT SERVICES | Facility: HOSPITAL | Age: 28
LOS: 1 days | End: 2023-02-22
Payer: COMMERCIAL

## 2023-02-22 ENCOUNTER — APPOINTMENT (OUTPATIENT)
Dept: PSYCHIATRY | Facility: CLINIC | Age: 28
End: 2023-02-22

## 2023-02-22 DIAGNOSIS — F32.A DEPRESSION, UNSPECIFIED: ICD-10-CM

## 2023-02-22 DIAGNOSIS — F60.3 BORDERLINE PERSONALITY DISORDER: ICD-10-CM

## 2023-02-22 DIAGNOSIS — F43.10 POST-TRAUMATIC STRESS DISORDER, UNSPECIFIED: ICD-10-CM

## 2023-02-22 PROCEDURE — ZZZZZ: CPT

## 2023-02-22 PROCEDURE — 99214 OFFICE O/P EST MOD 30 MIN: CPT | Mod: 95

## 2023-02-22 RX ORDER — VENLAFAXINE HYDROCHLORIDE 150 MG/1
150 CAPSULE, EXTENDED RELEASE ORAL
Qty: 30 | Refills: 0 | Status: COMPLETED | COMMUNITY
Start: 2021-10-12 | End: 2023-02-22

## 2023-02-22 RX ORDER — VENLAFAXINE HYDROCHLORIDE 75 MG/1
75 CAPSULE, EXTENDED RELEASE ORAL
Qty: 30 | Refills: 0 | Status: COMPLETED | COMMUNITY
Start: 2021-10-12 | End: 2023-02-22

## 2023-02-22 NOTE — PLAN
[No] : No [Medication education provided] : Medication education provided. [Rationale for medication choices, possible risks/precautions, benefits, alternative treatment choices, and consequences of non-treatment discussed] : Rationale for medication choices, possible risks/precautions, benefits, alternative treatment choices, and consequences of non-treatment discussed with patient/family/caregiver  [FreeTextEntry4] : To keep bpd, depression, ptsd and anxiety under control [FreeTextEntry5] : - continue Effexor XR 225mg po daily to target anxiety and depression\par - continue Quetiapine extended release 400mg PO QHS\par - Defer Gabapentin 300mg po BID to patient's other doctors to target her pain symptoms\par - Continue Hydroxyzine 25mg PO BID PRN for anxiety\par - Continue with school therapist and support group\par - Follow up in 1 month

## 2023-02-22 NOTE — PHYSICAL EXAM
[None] : none [Cooperative] : cooperative [Full] : full [Clear] : clear [Linear/Goal Directed] : linear/goal directed [None Reported] : none reported [WNL] : within normal limits [Average] : average [FreeTextEntry1] : colorful hair, casually dressed and groomed [FreeTextEntry8] : "okay" [de-identified] : fair [de-identified] : fair
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
## 2023-02-22 NOTE — REASON FOR VISIT
[Patient preference] : as per patient preference [Telehealth (audio & video) - Individual/Group] : This visit was provided via telehealth using real-time 2-way audio visual technology. [Medical Office: ([facility])___] : The provider was located at the medical office in [unfilled]. [Home] : The patient, [unfilled], was located at home, [unfilled], at the time of the visit. [Patient] : Patient [FreeTextEntry1] : Med management

## 2023-02-22 NOTE — DISCUSSION/SUMMARY
[FreeTextEntry1] : Patient is a 28 y/o F (identifies as "Julio," non-binary, they/them pronouns), recently single, domiciled with grandmother, with PMH of heart murmur with atrial septal defect (benign), PPH of borderline personality disorder, PTSD, obsessive compulsive sx, with 2 prior IPP admissions and 1 prior suicide attempt at age 15 via pill ingestion, current medical marijuana use for PTSD, presenting for follow up examination.\par \par Today, patient reported she may have a thyroid issue and has an appointment with an endocrinologist in april for workup. She additionalyl reported some episodes of "dissociation" where she felt weird but she reports she contineud to be aware of her situation and used grounding techniques to cope. She reports that she otherwise continues to see her therapist regularly. \par She denied si and hi. pt was future oriented and linear in thought. Currently the patient was not actively suicidal, homicidal, psychotic or manic during the interview. \par \par Risk assessment: Risk factors include history of suicide attempt, personality disorder diagnosis, PTSD symptoms, depressive symptoms, SIB; protective factors include no acute suicidality, future oriented towards completing coursework, social support from family/friends, engaged and compliant with treatment. Patient is therefore low acute suicide risk despite her chronically elevated risk.\par

## 2023-02-22 NOTE — SOCIAL HISTORY
[FreeTextEntry1] : 2 previous engagements, in same-sex relationship since 10/2018. Currently unemployed, Former [employer] ; Studying for bachelors in psychology and minor in women and gender studies, to graduate 2023. Mom in [place], Dad in [place]. Has 7 siblings, raised 5 siblings. Has friends. currently working as pharmacy tech. Domiciled with grandmother in [facility]. \par \par Description: medicinal marijuana for PTSD; 0.5 cartridge/month with daily use

## 2023-03-21 ENCOUNTER — EMERGENCY (EMERGENCY)
Facility: HOSPITAL | Age: 28
LOS: 0 days | Discharge: HOME | End: 2023-03-22
Attending: EMERGENCY MEDICINE
Payer: MEDICAID

## 2023-03-21 VITALS
TEMPERATURE: 99 F | HEIGHT: 59 IN | DIASTOLIC BLOOD PRESSURE: 63 MMHG | HEART RATE: 77 BPM | OXYGEN SATURATION: 99 % | WEIGHT: 177.03 LBS | RESPIRATION RATE: 20 BRPM | SYSTOLIC BLOOD PRESSURE: 111 MMHG

## 2023-03-21 DIAGNOSIS — R11.0 NAUSEA: ICD-10-CM

## 2023-03-21 DIAGNOSIS — F60.3 BORDERLINE PERSONALITY DISORDER: ICD-10-CM

## 2023-03-21 DIAGNOSIS — R19.7 DIARRHEA, UNSPECIFIED: ICD-10-CM

## 2023-03-21 DIAGNOSIS — F43.10 POST-TRAUMATIC STRESS DISORDER, UNSPECIFIED: ICD-10-CM

## 2023-03-21 DIAGNOSIS — Z87.19 PERSONAL HISTORY OF OTHER DISEASES OF THE DIGESTIVE SYSTEM: ICD-10-CM

## 2023-03-21 DIAGNOSIS — Z88.1 ALLERGY STATUS TO OTHER ANTIBIOTIC AGENTS STATUS: ICD-10-CM

## 2023-03-21 DIAGNOSIS — F32.A DEPRESSION, UNSPECIFIED: ICD-10-CM

## 2023-03-21 DIAGNOSIS — Z91.018 ALLERGY TO OTHER FOODS: ICD-10-CM

## 2023-03-21 DIAGNOSIS — Z88.0 ALLERGY STATUS TO PENICILLIN: ICD-10-CM

## 2023-03-21 DIAGNOSIS — N39.0 URINARY TRACT INFECTION, SITE NOT SPECIFIED: ICD-10-CM

## 2023-03-21 DIAGNOSIS — R10.9 UNSPECIFIED ABDOMINAL PAIN: ICD-10-CM

## 2023-03-21 LAB
ALBUMIN SERPL ELPH-MCNC: 3.9 G/DL — SIGNIFICANT CHANGE UP (ref 3.5–5.2)
ALP SERPL-CCNC: 91 U/L — SIGNIFICANT CHANGE UP (ref 30–115)
ALT FLD-CCNC: 14 U/L — SIGNIFICANT CHANGE UP (ref 0–41)
ANION GAP SERPL CALC-SCNC: 8 MMOL/L — SIGNIFICANT CHANGE UP (ref 7–14)
APPEARANCE UR: ABNORMAL
AST SERPL-CCNC: 15 U/L — SIGNIFICANT CHANGE UP (ref 0–41)
BACTERIA # UR AUTO: ABNORMAL
BASOPHILS # BLD AUTO: 0.07 K/UL — SIGNIFICANT CHANGE UP (ref 0–0.2)
BASOPHILS NFR BLD AUTO: 0.7 % — SIGNIFICANT CHANGE UP (ref 0–1)
BILIRUB SERPL-MCNC: <0.2 MG/DL — SIGNIFICANT CHANGE UP (ref 0.2–1.2)
BILIRUB UR-MCNC: NEGATIVE — SIGNIFICANT CHANGE UP
BUN SERPL-MCNC: 7 MG/DL — LOW (ref 10–20)
CALCIUM SERPL-MCNC: 8.9 MG/DL — SIGNIFICANT CHANGE UP (ref 8.4–10.5)
CHLORIDE SERPL-SCNC: 101 MMOL/L — SIGNIFICANT CHANGE UP (ref 98–110)
CO2 SERPL-SCNC: 25 MMOL/L — SIGNIFICANT CHANGE UP (ref 17–32)
COLOR SPEC: YELLOW — SIGNIFICANT CHANGE UP
CREAT SERPL-MCNC: 0.7 MG/DL — SIGNIFICANT CHANGE UP (ref 0.7–1.5)
DIFF PNL FLD: ABNORMAL
EGFR: 120 ML/MIN/1.73M2 — SIGNIFICANT CHANGE UP
EOSINOPHIL # BLD AUTO: 0.7 K/UL — SIGNIFICANT CHANGE UP (ref 0–0.7)
EOSINOPHIL NFR BLD AUTO: 7 % — SIGNIFICANT CHANGE UP (ref 0–8)
EPI CELLS # UR: ABNORMAL /HPF
GLUCOSE SERPL-MCNC: 126 MG/DL — HIGH (ref 70–99)
GLUCOSE UR QL: NEGATIVE MG/DL — SIGNIFICANT CHANGE UP
HCG SERPL QL: NEGATIVE — SIGNIFICANT CHANGE UP
HCT VFR BLD CALC: 37.9 % — SIGNIFICANT CHANGE UP (ref 37–47)
HGB BLD-MCNC: 12.8 G/DL — SIGNIFICANT CHANGE UP (ref 12–16)
IMM GRANULOCYTES NFR BLD AUTO: 0.4 % — HIGH (ref 0.1–0.3)
KETONES UR-MCNC: NEGATIVE — SIGNIFICANT CHANGE UP
LEUKOCYTE ESTERASE UR-ACNC: ABNORMAL
LIDOCAIN IGE QN: 41 U/L — SIGNIFICANT CHANGE UP (ref 7–60)
LYMPHOCYTES # BLD AUTO: 1.9 K/UL — SIGNIFICANT CHANGE UP (ref 1.2–3.4)
LYMPHOCYTES # BLD AUTO: 19 % — LOW (ref 20.5–51.1)
MAGNESIUM SERPL-MCNC: 1.9 MG/DL — SIGNIFICANT CHANGE UP (ref 1.8–2.4)
MCHC RBC-ENTMCNC: 27.7 PG — SIGNIFICANT CHANGE UP (ref 27–31)
MCHC RBC-ENTMCNC: 33.8 G/DL — SIGNIFICANT CHANGE UP (ref 32–37)
MCV RBC AUTO: 82 FL — SIGNIFICANT CHANGE UP (ref 81–99)
MONOCYTES # BLD AUTO: 0.79 K/UL — HIGH (ref 0.1–0.6)
MONOCYTES NFR BLD AUTO: 7.9 % — SIGNIFICANT CHANGE UP (ref 1.7–9.3)
NEUTROPHILS # BLD AUTO: 6.49 K/UL — SIGNIFICANT CHANGE UP (ref 1.4–6.5)
NEUTROPHILS NFR BLD AUTO: 65 % — SIGNIFICANT CHANGE UP (ref 42.2–75.2)
NITRITE UR-MCNC: NEGATIVE — SIGNIFICANT CHANGE UP
NRBC # BLD: 0 /100 WBCS — SIGNIFICANT CHANGE UP (ref 0–0)
PH UR: 6 — SIGNIFICANT CHANGE UP (ref 5–8)
PLATELET # BLD AUTO: 301 K/UL — SIGNIFICANT CHANGE UP (ref 130–400)
POTASSIUM SERPL-MCNC: 3.9 MMOL/L — SIGNIFICANT CHANGE UP (ref 3.5–5)
POTASSIUM SERPL-SCNC: 3.9 MMOL/L — SIGNIFICANT CHANGE UP (ref 3.5–5)
PROT SERPL-MCNC: 6.8 G/DL — SIGNIFICANT CHANGE UP (ref 6–8)
PROT UR-MCNC: ABNORMAL MG/DL
RBC # BLD: 4.62 M/UL — SIGNIFICANT CHANGE UP (ref 4.2–5.4)
RBC # FLD: 12.9 % — SIGNIFICANT CHANGE UP (ref 11.5–14.5)
RBC CASTS # UR COMP ASSIST: ABNORMAL /HPF
SODIUM SERPL-SCNC: 134 MMOL/L — LOW (ref 135–146)
SP GR SPEC: 1.02 — SIGNIFICANT CHANGE UP (ref 1.01–1.03)
UROBILINOGEN FLD QL: 0.2 MG/DL — SIGNIFICANT CHANGE UP
WBC # BLD: 9.99 K/UL — SIGNIFICANT CHANGE UP (ref 4.8–10.8)
WBC # FLD AUTO: 9.99 K/UL — SIGNIFICANT CHANGE UP (ref 4.8–10.8)
WBC UR QL: >50 /HPF

## 2023-03-21 PROCEDURE — 36415 COLL VENOUS BLD VENIPUNCTURE: CPT

## 2023-03-21 PROCEDURE — 85025 COMPLETE CBC W/AUTO DIFF WBC: CPT

## 2023-03-21 PROCEDURE — 80053 COMPREHEN METABOLIC PANEL: CPT

## 2023-03-21 PROCEDURE — 96374 THER/PROPH/DIAG INJ IV PUSH: CPT | Mod: XU

## 2023-03-21 PROCEDURE — 84703 CHORIONIC GONADOTROPIN ASSAY: CPT

## 2023-03-21 PROCEDURE — 99284 EMERGENCY DEPT VISIT MOD MDM: CPT

## 2023-03-21 PROCEDURE — 99284 EMERGENCY DEPT VISIT MOD MDM: CPT | Mod: 25

## 2023-03-21 PROCEDURE — 74177 CT ABD & PELVIS W/CONTRAST: CPT | Mod: 26,MA

## 2023-03-21 PROCEDURE — 81001 URINALYSIS AUTO W/SCOPE: CPT

## 2023-03-21 PROCEDURE — 83735 ASSAY OF MAGNESIUM: CPT

## 2023-03-21 PROCEDURE — 83690 ASSAY OF LIPASE: CPT

## 2023-03-21 PROCEDURE — 74177 CT ABD & PELVIS W/CONTRAST: CPT | Mod: MA

## 2023-03-21 RX ORDER — SODIUM CHLORIDE 9 MG/ML
1000 INJECTION INTRAMUSCULAR; INTRAVENOUS; SUBCUTANEOUS ONCE
Refills: 0 | Status: COMPLETED | OUTPATIENT
Start: 2023-03-21 | End: 2023-03-21

## 2023-03-21 RX ORDER — ONDANSETRON 8 MG/1
4 TABLET, FILM COATED ORAL ONCE
Refills: 0 | Status: COMPLETED | OUTPATIENT
Start: 2023-03-21 | End: 2023-03-21

## 2023-03-21 RX ADMIN — ONDANSETRON 4 MILLIGRAM(S): 8 TABLET, FILM COATED ORAL at 22:22

## 2023-03-21 RX ADMIN — SODIUM CHLORIDE 1000 MILLILITER(S): 9 INJECTION INTRAMUSCULAR; INTRAVENOUS; SUBCUTANEOUS at 22:22

## 2023-03-21 NOTE — ED PROVIDER NOTE - CLINICAL SUMMARY MEDICAL DECISION MAKING FREE TEXT BOX
27-year-old female with history of chronic back pain, IBS, depression, borderline personality disorder, here for assessment of right lower quadrant/right flank pain associated with multiple episodes of nonbloody, nonmucoid diarrhea.  Pain is worse with urination but has no dysuria.  No fevers, chills.  No recent travel or sick contacts.    Vital signs are normal.  The patient is well-appearing, no distress.  She has clear lungs, regular rate and rhythm, soft abdomen with mild right lower quadrant tenderness palpation.  No rebound or guarding.  No CVA tenderness to palpation.    Labs are normal.  UA is positive with leuks, blood, greater than 50 WBCs.    CT scan without acute intra-abdominal pathology.    Symptoms likely related to UTI.  No signs of appendicitis, pyelonephritis, renal colic.    On reassessment, the patient is feeling better, p.o. tolerant, no further diarrhea.    Will DC him with close monitoring, antibiotics, Bentyl, and Zofran.    Advised on follow-up, return precautions.

## 2023-03-21 NOTE — ED PROVIDER NOTE - OBJECTIVE STATEMENT
26 y/o female presents to the ED with abdominal pain and nausea since this am. patient with non bloody diarrhea. patient with hx of chronic back pain and IBS. no fevers, recent antibx, sick contacts. no dysuria . no black or bloody stools. patient c/o crampy abdominal pain .

## 2023-03-21 NOTE — ED PROVIDER NOTE - PATIENT PORTAL LINK FT
You can access the FollowMyHealth Patient Portal offered by [facility] by registering at the following website: [email or web address]. By joining [facility]’s FollowMyHealth portal, you will also be able to view your health information using other applications (apps) compatible with our system.

## 2023-03-21 NOTE — ED PROVIDER NOTE - PHYSICAL EXAMINATION
Vital Signs: I have reviewed the initial vital signs.  Constitutional: well-nourished, no acute distress, normocephalic  Eyes: PERRLA, EOMI, clear conjunctiva  ENT: MMM,   Cardiovascular: regular rate, regular rhythm, no murmur appreciated  Respiratory: unlabored respiratory effort, clear to auscultation bilaterally  Gastrointestinal: soft, RLQ tenderness, no cva tenderness, non-distended  abdomen, no pulsatile mass  Musculoskeletal: supple neck, no lower extremity edema, no bony tenderness  Integumentary: warm, dry, no rash  Neurologic: awake, alert, cranial nerves II-XII grossly intact, extremities’ motor and sensory functions grossly intact, no focal deficits

## 2023-03-22 ENCOUNTER — NON-APPOINTMENT (OUTPATIENT)
Age: 28
End: 2023-03-22

## 2023-03-22 RX ORDER — CEFPODOXIME PROXETIL 100 MG
1 TABLET ORAL
Qty: 20 | Refills: 0
Start: 2023-03-22 | End: 2023-03-31

## 2023-03-22 RX ORDER — CEFPODOXIME PROXETIL 100 MG
100 TABLET ORAL ONCE
Refills: 0 | Status: COMPLETED | OUTPATIENT
Start: 2023-03-22 | End: 2023-03-22

## 2023-03-22 RX ORDER — ONDANSETRON 8 MG/1
1 TABLET, FILM COATED ORAL
Qty: 3 | Refills: 0
Start: 2023-03-22 | End: 2023-03-22

## 2023-03-22 RX ADMIN — Medication 100 MILLIGRAM(S): at 01:09

## 2023-03-23 ENCOUNTER — OUTPATIENT (OUTPATIENT)
Dept: OUTPATIENT SERVICES | Facility: HOSPITAL | Age: 28
LOS: 1 days | End: 2023-03-23
Payer: COMMERCIAL

## 2023-03-23 ENCOUNTER — APPOINTMENT (OUTPATIENT)
Dept: PSYCHIATRY | Facility: CLINIC | Age: 28
End: 2023-03-23
Payer: COMMERCIAL

## 2023-03-23 DIAGNOSIS — F32.A DEPRESSION, UNSPECIFIED: ICD-10-CM

## 2023-03-23 DIAGNOSIS — F60.3 BORDERLINE PERSONALITY DISORDER: ICD-10-CM

## 2023-03-23 DIAGNOSIS — F43.10 POST-TRAUMATIC STRESS DISORDER, UNSPECIFIED: ICD-10-CM

## 2023-03-23 PROCEDURE — 99214 OFFICE O/P EST MOD 30 MIN: CPT | Mod: 95

## 2023-03-23 PROCEDURE — ZZZZZ: CPT

## 2023-03-24 NOTE — SOCIAL HISTORY
[FreeTextEntry1] : 2 previous engagements, in same-sex relationship since 10/2018. Currently unemployed, Former [employer] ; Studying for bachelors in psychology and minor in women and gender studies, to graduate 2023. Mom in [place], Dad in [place]. Has 7 siblings, raised 5 siblings. Has friends. currently working as pharmacy tech. Domiciled with grandmother in [facility]. \par \par Description: medicinal marijuana for PTSD; 0.5 cartridge/month with daily use

## 2023-03-24 NOTE — REASON FOR VISIT
[Patient preference] : as per patient preference [Telehealth (audio & video) - Individual/Group] : This visit was provided via telehealth using real-time 2-way audio visual technology. [Medical Office: ([facility])___] : The provider was located at the medical office in [unfilled]. [Home] : The patient, [unfilled], was located at home, [unfilled], at the time of the visit. [Patient] : Patient [FreeTextEntry1] : Med management
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
## 2023-03-24 NOTE — PLAN
[No] : No [Medication education provided] : Medication education provided. [Rationale for medication choices, possible risks/precautions, benefits, alternative treatment choices, and consequences of non-treatment discussed] : Rationale for medication choices, possible risks/precautions, benefits, alternative treatment choices, and consequences of non-treatment discussed with patient/family/caregiver  [FreeTextEntry4] : To keep bpd, depression, ptsd and anxiety under control [FreeTextEntry5] : - continue Effexor XR 225mg po daily to target anxiety and depression\par - continue Quetiapine extended release 400mg PO QHS\par - Defer Gabapentin 300mg po BID to patient's other doctors to target her pain symptoms\par - Continue Hydroxyzine 25mg PO BID PRN for anxiety\par - Continue with school therapist and support group\par - Follow up in 1 week due slight elevated risk due to loss of friend

## 2023-03-24 NOTE — DISCUSSION/SUMMARY
[FreeTextEntry1] : Patient is a 26 y/o F (identifies as "Julio," non-binary, they/them pronouns), recently single, domiciled with grandmother, with PMH of heart murmur with atrial septal defect (benign), PPH of borderline personality disorder, PTSD, obsessive compulsive sx, with 2 prior IPP admissions and 1 prior suicide attempt at age 15 via pill ingestion, current medical marijuana use for PTSD, presenting for follow up examination.\par \par Today, patient reported that they were recently informed about the loss of a friend, however, they report that they have dealt with loss before and reported they they had a number of coping skills to help them through difficult times. They adamantly denied si, they reported that they would reach out to their therapist and they were aware about crisis lines including 911/988. They additionally reported improvement in sleep, energy and mood after starting Synthroid. They denied si and hi. pt was future oriented and linear in thought. Currently the patient was not actively suicidal, homicidal, psychotic or manic during the interview. \par \par Risk assessment: Risk factors include history of suicide attempt, personality disorder diagnosis, PTSD symptoms, depressive symptoms, SIB and recent trigger; protective factors include no acute suicidality, future oriented towards completing coursework, social support from family/friends, engaged and compliant with treatment and help seeking behavior. Patient is therefore low to mod acute suicide risk despite her chronically elevated risk.\par

## 2023-03-31 ENCOUNTER — NON-APPOINTMENT (OUTPATIENT)
Age: 28
End: 2023-03-31

## 2023-03-31 ENCOUNTER — OUTPATIENT (OUTPATIENT)
Dept: OUTPATIENT SERVICES | Facility: HOSPITAL | Age: 28
LOS: 1 days | End: 2023-03-31
Payer: COMMERCIAL

## 2023-03-31 ENCOUNTER — APPOINTMENT (OUTPATIENT)
Dept: PSYCHIATRY | Facility: CLINIC | Age: 28
End: 2023-03-31
Payer: COMMERCIAL

## 2023-03-31 DIAGNOSIS — F60.3 BORDERLINE PERSONALITY DISORDER: ICD-10-CM

## 2023-03-31 DIAGNOSIS — F32.A DEPRESSION, UNSPECIFIED: ICD-10-CM

## 2023-03-31 PROCEDURE — ZZZZZ: CPT

## 2023-03-31 PROCEDURE — 99213 OFFICE O/P EST LOW 20 MIN: CPT | Mod: 95

## 2023-03-31 NOTE — DISCUSSION/SUMMARY
[FreeTextEntry1] : Patient is a 26 y/o F (identifies as "Julio," non-binary, they/them pronouns), recently single, domiciled with grandmother, with PMH of heart murmur with atrial septal defect (benign), PPH of borderline personality disorder, PTSD, obsessive compulsive sx, with 2 prior IPP admissions and 1 prior suicide attempt at age 15 via pill ingestion, current medical marijuana use for PTSD, presenting for follow up examination.\par \par Today, patient reported that they remain well, however report some irritability, likely due to the stress and sadness. They report that despite the irritability, they have kept their cool and not had any notable outbursts. They report that they continue to have good insight into intrusive thoughts, know when to and feel comfortable asking for help. They denied si and hi. pt was future oriented and linear in thought. Currently the patient was not actively suicidal, homicidal, psychotic or manic during the interview.\par \par Risk assessment: Risk factors include history of suicide attempt, personality disorder diagnosis, PTSD symptoms, depressive symptoms, SIB and recent trigger; protective factors include no acute suicidality, future oriented towards completing coursework, social support from family/friends, engaged and compliant with treatment and help seeking behavior. Patient is therefore low to mod acute suicide risk despite her chronically elevated risk.\par

## 2023-03-31 NOTE — SOCIAL HISTORY
[FreeTextEntry1] : 2 previous engagements, in same-sex relationship since 10/2018. Currently unemployed, Former [employer] ; Studying for bachelors in psychology and minor in women and gender studies, to graduate 2023. Mom in [place], Dad in [place]. Has 7 siblings, raised 5 siblings. Has friends. currently working as pharmacy tech. Domiciled with grandmother in [facility]. \par \par Description: medicinal marijuana for PTSD; 0.5 cartridge/month with daily use
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
## 2023-04-01 DIAGNOSIS — F32.A DEPRESSION, UNSPECIFIED: ICD-10-CM

## 2023-04-01 DIAGNOSIS — F60.3 BORDERLINE PERSONALITY DISORDER: ICD-10-CM

## 2023-04-16 ENCOUNTER — EMERGENCY (EMERGENCY)
Facility: HOSPITAL | Age: 28
LOS: 0 days | Discharge: HOME | End: 2023-04-16
Attending: EMERGENCY MEDICINE
Payer: MEDICAID

## 2023-04-16 VITALS
SYSTOLIC BLOOD PRESSURE: 129 MMHG | HEART RATE: 74 BPM | HEIGHT: 59 IN | RESPIRATION RATE: 18 BRPM | DIASTOLIC BLOOD PRESSURE: 80 MMHG | WEIGHT: 169.98 LBS | TEMPERATURE: 96 F | OXYGEN SATURATION: 98 %

## 2023-04-16 VITALS
HEART RATE: 78 BPM | SYSTOLIC BLOOD PRESSURE: 126 MMHG | TEMPERATURE: 96 F | OXYGEN SATURATION: 99 % | RESPIRATION RATE: 18 BRPM | DIASTOLIC BLOOD PRESSURE: 78 MMHG

## 2023-04-16 DIAGNOSIS — R61 GENERALIZED HYPERHIDROSIS: ICD-10-CM

## 2023-04-16 DIAGNOSIS — F42.9 OBSESSIVE-COMPULSIVE DISORDER, UNSPECIFIED: ICD-10-CM

## 2023-04-16 DIAGNOSIS — Z91.048 OTHER NONMEDICINAL SUBSTANCE ALLERGY STATUS: ICD-10-CM

## 2023-04-16 DIAGNOSIS — K58.9 IRRITABLE BOWEL SYNDROME WITHOUT DIARRHEA: ICD-10-CM

## 2023-04-16 DIAGNOSIS — Z88.1 ALLERGY STATUS TO OTHER ANTIBIOTIC AGENTS STATUS: ICD-10-CM

## 2023-04-16 DIAGNOSIS — Z88.0 ALLERGY STATUS TO PENICILLIN: ICD-10-CM

## 2023-04-16 DIAGNOSIS — E03.9 HYPOTHYROIDISM, UNSPECIFIED: ICD-10-CM

## 2023-04-16 DIAGNOSIS — Z88.8 ALLERGY STATUS TO OTHER DRUGS, MEDICAMENTS AND BIOLOGICAL SUBSTANCES: ICD-10-CM

## 2023-04-16 DIAGNOSIS — F60.3 BORDERLINE PERSONALITY DISORDER: ICD-10-CM

## 2023-04-16 DIAGNOSIS — G43.909 MIGRAINE, UNSPECIFIED, NOT INTRACTABLE, WITHOUT STATUS MIGRAINOSUS: ICD-10-CM

## 2023-04-16 DIAGNOSIS — Z87.891 PERSONAL HISTORY OF NICOTINE DEPENDENCE: ICD-10-CM

## 2023-04-16 DIAGNOSIS — F41.9 ANXIETY DISORDER, UNSPECIFIED: ICD-10-CM

## 2023-04-16 DIAGNOSIS — Z91.018 ALLERGY TO OTHER FOODS: ICD-10-CM

## 2023-04-16 DIAGNOSIS — F32.9 MAJOR DEPRESSIVE DISORDER, SINGLE EPISODE, UNSPECIFIED: ICD-10-CM

## 2023-04-16 DIAGNOSIS — F43.10 POST-TRAUMATIC STRESS DISORDER, UNSPECIFIED: ICD-10-CM

## 2023-04-16 LAB
ALBUMIN SERPL ELPH-MCNC: 4.5 G/DL — SIGNIFICANT CHANGE UP (ref 3.5–5.2)
ALP SERPL-CCNC: 100 U/L — SIGNIFICANT CHANGE UP (ref 30–115)
ALT FLD-CCNC: 14 U/L — SIGNIFICANT CHANGE UP (ref 0–41)
ANION GAP SERPL CALC-SCNC: 11 MMOL/L — SIGNIFICANT CHANGE UP (ref 7–14)
AST SERPL-CCNC: 17 U/L — SIGNIFICANT CHANGE UP (ref 0–41)
BASOPHILS # BLD AUTO: 0.06 K/UL — SIGNIFICANT CHANGE UP (ref 0–0.2)
BASOPHILS NFR BLD AUTO: 0.6 % — SIGNIFICANT CHANGE UP (ref 0–1)
BILIRUB SERPL-MCNC: 0.2 MG/DL — SIGNIFICANT CHANGE UP (ref 0.2–1.2)
BUN SERPL-MCNC: 9 MG/DL — LOW (ref 10–20)
CALCIUM SERPL-MCNC: 10 MG/DL — SIGNIFICANT CHANGE UP (ref 8.4–10.5)
CHLORIDE SERPL-SCNC: 103 MMOL/L — SIGNIFICANT CHANGE UP (ref 98–110)
CO2 SERPL-SCNC: 27 MMOL/L — SIGNIFICANT CHANGE UP (ref 17–32)
CREAT SERPL-MCNC: 0.8 MG/DL — SIGNIFICANT CHANGE UP (ref 0.7–1.5)
EGFR: 102 ML/MIN/1.73M2 — SIGNIFICANT CHANGE UP
EOSINOPHIL # BLD AUTO: 0.74 K/UL — HIGH (ref 0–0.7)
EOSINOPHIL NFR BLD AUTO: 6.9 % — SIGNIFICANT CHANGE UP (ref 0–8)
GLUCOSE SERPL-MCNC: 102 MG/DL — HIGH (ref 70–99)
HCG SERPL QL: NEGATIVE — SIGNIFICANT CHANGE UP
HCT VFR BLD CALC: 40.8 % — SIGNIFICANT CHANGE UP (ref 37–47)
HGB BLD-MCNC: 13.7 G/DL — SIGNIFICANT CHANGE UP (ref 12–16)
IMM GRANULOCYTES NFR BLD AUTO: 0.4 % — HIGH (ref 0.1–0.3)
LYMPHOCYTES # BLD AUTO: 19.9 % — LOW (ref 20.5–51.1)
LYMPHOCYTES # BLD AUTO: 2.13 K/UL — SIGNIFICANT CHANGE UP (ref 1.2–3.4)
MAGNESIUM SERPL-MCNC: 2 MG/DL — SIGNIFICANT CHANGE UP (ref 1.8–2.4)
MCHC RBC-ENTMCNC: 27.3 PG — SIGNIFICANT CHANGE UP (ref 27–31)
MCHC RBC-ENTMCNC: 33.6 G/DL — SIGNIFICANT CHANGE UP (ref 32–37)
MCV RBC AUTO: 81.4 FL — SIGNIFICANT CHANGE UP (ref 81–99)
MONOCYTES # BLD AUTO: 0.7 K/UL — HIGH (ref 0.1–0.6)
MONOCYTES NFR BLD AUTO: 6.5 % — SIGNIFICANT CHANGE UP (ref 1.7–9.3)
NEUTROPHILS # BLD AUTO: 7.06 K/UL — HIGH (ref 1.4–6.5)
NEUTROPHILS NFR BLD AUTO: 65.7 % — SIGNIFICANT CHANGE UP (ref 42.2–75.2)
NRBC # BLD: 0 /100 WBCS — SIGNIFICANT CHANGE UP (ref 0–0)
PLATELET # BLD AUTO: 332 K/UL — SIGNIFICANT CHANGE UP (ref 130–400)
PMV BLD: 9.5 FL — SIGNIFICANT CHANGE UP (ref 7.4–10.4)
POTASSIUM SERPL-MCNC: 4.1 MMOL/L — SIGNIFICANT CHANGE UP (ref 3.5–5)
POTASSIUM SERPL-SCNC: 4.1 MMOL/L — SIGNIFICANT CHANGE UP (ref 3.5–5)
PROT SERPL-MCNC: 7.3 G/DL — SIGNIFICANT CHANGE UP (ref 6–8)
RBC # BLD: 5.01 M/UL — SIGNIFICANT CHANGE UP (ref 4.2–5.4)
RBC # FLD: 12.8 % — SIGNIFICANT CHANGE UP (ref 11.5–14.5)
SODIUM SERPL-SCNC: 141 MMOL/L — SIGNIFICANT CHANGE UP (ref 135–146)
WBC # BLD: 10.73 K/UL — SIGNIFICANT CHANGE UP (ref 4.8–10.8)
WBC # FLD AUTO: 10.73 K/UL — SIGNIFICANT CHANGE UP (ref 4.8–10.8)

## 2023-04-16 PROCEDURE — 93010 ELECTROCARDIOGRAM REPORT: CPT

## 2023-04-16 PROCEDURE — 71045 X-RAY EXAM CHEST 1 VIEW: CPT

## 2023-04-16 PROCEDURE — 99285 EMERGENCY DEPT VISIT HI MDM: CPT | Mod: 25

## 2023-04-16 PROCEDURE — 84703 CHORIONIC GONADOTROPIN ASSAY: CPT

## 2023-04-16 PROCEDURE — 83735 ASSAY OF MAGNESIUM: CPT

## 2023-04-16 PROCEDURE — 99284 EMERGENCY DEPT VISIT MOD MDM: CPT

## 2023-04-16 PROCEDURE — 85025 COMPLETE CBC W/AUTO DIFF WBC: CPT

## 2023-04-16 PROCEDURE — 36415 COLL VENOUS BLD VENIPUNCTURE: CPT

## 2023-04-16 PROCEDURE — 93005 ELECTROCARDIOGRAM TRACING: CPT

## 2023-04-16 PROCEDURE — 80053 COMPREHEN METABOLIC PANEL: CPT

## 2023-04-16 PROCEDURE — 71045 X-RAY EXAM CHEST 1 VIEW: CPT | Mod: 26

## 2023-04-16 NOTE — ED PROVIDER NOTE - CLINICAL SUMMARY MEDICAL DECISION MAKING FREE TEXT BOX
Essential hypertension 27-year-old female, history of borderline personality disorder, depression, IBS, PTSD, hypothyroidism, presents with sweating, feeling hot and headache for few days.  Exam unremarkable.  Labs normal.  EKG normal sinus rhythm no acute changes.  Chest x-ray no acute disease.  Will DC to follow-up with PCP.

## 2023-04-16 NOTE — ED PROVIDER NOTE - ATTENDING APP SHARED VISIT CONTRIBUTION OF CARE
27-year-old female, history of borderline personality disorder, depression, IBS, PTSD, hypothyroidism, presents with sweating, feeling hot and headache for few days.  No fever.  Did not take levothyroxine today.  Exam shows alert patient in no distress, HEENT NCAT PERRL, neck supple, lungs clear, RR S1S2, abdomen soft NT +BS, no CCE, skin no rash, neuro A&OX3 GCS 15 no deficits.

## 2023-04-16 NOTE — ED ADULT NURSE NOTE - ISOLATION TYPE:
Progress Notes by Mely Landaverde MD at 02/19/17 10:19 AM     Author:  Mely Landaverde MD Service:  (none) Author Type:  Physician     Filed:  02/19/17 10:19 AM Encounter Date:  2/18/2017 Status:  Signed     :  Mely Landaverde MD (Physician)            TSH is therapeutic, please notify patient of results and to continue with current dose of Levothyroxine/Synthroid 50 micros daily  Ok for 6 month refill   Call or RTC if additional questions or concerns    Electronically Signed by:    Mely Landaverde MD , 2/19/2017     [SO1.1M]    Revision History        User Key Date/Time User Provider Type Action    > SO1.1 02/19/17 10:19 AM Mely Landaverde MD Physician Sign    M - Manual             None

## 2023-04-16 NOTE — ED PROVIDER NOTE - CARE PROVIDER_API CALL
ARIANA VILLANUEVA  Endocrinology, Diabetes and Metabolism  [address] [place] [address]  Phone: [phone]  Fax: [phone]  Follow Up Time: 7-10 Days    YOUR PRIMARY CARE DOCTOR,   Phone: (   )    -  Fax: (   )    -  Follow Up Time: 7-10 Days

## 2023-04-16 NOTE — ED PROVIDER NOTE - PROVIDER TOKENS
PROVIDER:[TOKEN:[[id number]],FOLLOWUP:[7-10 Days]],FREE:[LAST:[YOUR PRIMARY CARE DOCTOR],PHONE:[(   )    -],FAX:[(   )    -],FOLLOWUP:[7-10 Days]]

## 2023-04-16 NOTE — ED PROVIDER NOTE - PATIENT PORTAL LINK FT
You can access the FollowMyHealth Patient Portal offered by [facility] by registering at the following website: [email or web address]. By joining [facility]’s FollowMyHealth portal, you will also be able to view your health information using other applications (apps) compatible with our system.

## 2023-04-16 NOTE — ED PROVIDER NOTE - OBJECTIVE STATEMENT
28 y/o female with as PMH of hypothyroidism on levothyroxine the past two months followed by lanie Hutchinson, bipolar, MDD, OCD, anxiety, and PFO presents to the ED for evaluation of night sweats the past week and a migraine that began today. pt is concerned this is a reaction due to her levothyroxine after speaking to her pcp this morning. pt reports 5 pound weight loss since starting levothyroxine. pt denies easy bruising or bleeding, blood in the urine or stool, fhx of lymphoma, hx of tuberculosis, cough, runny nose, sore throat, dizziness, visual changes, back pain, abdominal pain, n/v/d/c, illicit drug use, or weakness.

## 2023-04-16 NOTE — ED PROVIDER NOTE - PROGRESS NOTE DETAILS
FF: I discussed with pt drawing thyroid panel labs, but discussed she would have to download [employer] jayden and follow results with her pcp and or endocrinologist. pt reports she would prefer to have the thyroid panel labs drawn with her endo and reports her endo has an urgent care where they can draw blood.

## 2023-04-16 NOTE — ED PROVIDER NOTE - NSFOLLOWUPINSTRUCTIONS_ED_ALL_ED_FT
Night sweats are repeated episodes of very heavy sweating during sleep, heavy enough to soak your nightclothes or bedding. They're often caused by an underlying condition or illness.    Sometimes you may wake up after sweating heavily, particularly if you're sleeping under too many blankets or your bedroom is too warm. Although uncomfortable, these episodes aren't usually considered night sweats and aren't sign of an underlying condition or illness.    Night sweats usually happen with other concerning symptoms, such as fever, weight loss, pain in a specific area, cough or diarrhea.    Medications that can cause night sweats  Causes of night sweats include medication such as:    Depression medications (antidepressants)  Hormone therapy  A drug used to treat opioid use disorder (methadone)  Drugs used to treat low blood sugar with diabetes (hypoglycemic agents)  Conditions that can cause night sweats  Causes of night sweats include conditions and illnesses such as:    Alcohol use disorder  Anxiety disorders  Autoimmune disorders  Autonomic neuropathy (damage to your autonomic nerves)  Brucellosis (a bacterial infection)  Carcinoid tumors (a type of neuroendocrine tumor)  Drug addiction (substance use disorder)  Endocarditis (an infection of the inner lining of the heart)  HIV/AIDS  Hodgkin's lymphoma (Hodgkin's disease)  Hyperthyroidism (overactive thyroid)  Leukemia  Menopause  Myelofibrosis (a bone marrow disorder)  Non-Hodgkin's lymphoma  Osteomyelitis  Pheochromocytoma (a rare adrenal gland tumor)  Pyogenic abscess (a pus-filled cavity caused by an infection)  Sleep disorders (such as obstructive sleep apnea)  Stroke  Syringomyelia (a fluid-filled cyst in the spinal cord)  Takayasu's arteritis  Thyroid disease  Tuberculosis  Valley fever (coccidioidomycosis)    Schedule a visit with your health care provider if night sweats:    Occur on a regular basis  Interrupt your sleep  Are accompanied by a fever, weight loss, pain in a specific area, cough, diarrhea or other symptoms of concern  Start months or years after menopause symptoms ended

## 2023-04-16 NOTE — ED PROVIDER NOTE - PHYSICAL EXAMINATION
Physical Exam    Vital Signs: I have reviewed the initial vital signs.  Constitutional: well-nourished, appears stated age, no acute distress  Eyes: Conjunctiva pink, Sclera clear, PERRLA, EOMI without pain.  Cardiovascular: S1 and S2, regular rate, regular rhythm, well-perfused extremities, radial pulses equal and 2+ b/l.   Respiratory: unlabored respiratory effort, clear to auscultation bilaterally no wheezing, rales and rhonchi. pt is speaking full sentences. no accessory muscle use.   Gastrointestinal: soft, non-tender, nondistended abdomen, no pulsatile mass, normal bowl sounds, no rebound, no guarding  Musculoskeletal: supple neck, no lower extremity edema, no calf tenderness. No palpable LAD. No cervical, supraclavicular, or axillary lymphadenopathy.   Integumentary: warm, dry, no rash  Neurologic: awake, alert, extremities’ motor and sensory functions grossly intact.  steady gait.   Psychiatric: appropriate mood, appropriate affect

## 2023-04-28 ENCOUNTER — NON-APPOINTMENT (OUTPATIENT)
Age: 28
End: 2023-04-28

## 2023-05-01 ENCOUNTER — OUTPATIENT (OUTPATIENT)
Dept: OUTPATIENT SERVICES | Facility: HOSPITAL | Age: 28
LOS: 1 days | End: 2023-05-01
Payer: COMMERCIAL

## 2023-05-01 ENCOUNTER — APPOINTMENT (OUTPATIENT)
Dept: PSYCHIATRY | Facility: CLINIC | Age: 28
End: 2023-05-01
Payer: COMMERCIAL

## 2023-05-01 DIAGNOSIS — F32.A DEPRESSION, UNSPECIFIED: ICD-10-CM

## 2023-05-01 DIAGNOSIS — F60.3 BORDERLINE PERSONALITY DISORDER: ICD-10-CM

## 2023-05-01 PROCEDURE — 99214 OFFICE O/P EST MOD 30 MIN: CPT | Mod: 95

## 2023-05-01 PROCEDURE — ZZZZZ: CPT

## 2023-05-01 NOTE — PLAN
[FreeTextEntry4] : To keep bpd, depression, ptsd and anxiety under control [FreeTextEntry5] : - patient decreased Effexor XR to 150mg po daily to target anxiety and depression\par - continue Quetiapine extended release 400mg PO QHS\par - Defer Gabapentin 300mg po BID to patient's other doctors to target her pain symptoms\par - Continue Hydroxyzine 25mg -50mg PO BID PRN for anxiety/ irritability \par - Continue with school therapist and support group

## 2023-05-01 NOTE — PHYSICAL EXAM
[FreeTextEntry8] : "okay" [de-identified] : good [de-identified] : fair [Cooperative] : cooperative [Full] : full [Clear] : clear [Linear/Goal Directed] : linear/goal directed [None Reported] : none reported [WNL] : within normal limits [Average] : average

## 2023-05-01 NOTE — SOCIAL HISTORY
[FreeTextEntry1] : 2 previous engagements, in same-sex relationship since 10/2018. Currently unemployed, Former [employer] ; Studying for bachelors in psychology and minor in women and gender studies, to graduate 2023. Mom in [place], Dad in [place]. Has 7 siblings, raised 5 siblings. Has friends. currently working as pharmacy tech. Domiciled with grandmother in [facility]. \par \par Description: medicinal marijuana for PTSD; 0.5 cartridge/month with daily use

## 2023-05-01 NOTE — REASON FOR VISIT
[FreeTextEntry1] : Med management  [Patient preference] : as per patient preference [Telephone (audio) - Individual/Group] : This telephonic visit was provided via audio only technology. [This encounter was initiated by telehealth (audio with video) and converted to telephone (audio only) due to technical difficulties.] : This encounter was initiated by telehealth (audio with video) and converted to telephone (audio only) due to technical difficulties. [Medical Office: ([facility])___] : The provider was located at the medical office in [unfilled]. [Home] : The patient, [unfilled], was located at home, [unfilled], at the time of the visit. [Patient] : Patient

## 2023-05-01 NOTE — PLAN
[FreeTextEntry4] : To keep bpd, depression, ptsd and anxiety under control [FreeTextEntry5] : - continue Effexor XR 225mg po daily to target anxiety and depression\par - continue Quetiapine extended release 400mg PO QHS\par - Defer Gabapentin 300mg po BID to patient's other doctors to target her pain symptoms\par - Continue Hydroxyzine 25mg -50mg PO BID PRN for anxiety/ irritability \par - Continue with school therapist and support group [No] : No [Medication education provided] : Medication education provided. [Rationale for medication choices, possible risks/precautions, benefits, alternative treatment choices, and consequences of non-treatment discussed] : Rationale for medication choices, possible risks/precautions, benefits, alternative treatment choices, and consequences of non-treatment discussed with patient/family/caregiver
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
## 2023-05-01 NOTE — DISCUSSION/SUMMARY
[FreeTextEntry1] : Patient is a 28 y/o F (identifies as "Julio," non-binary, they/them pronouns), recently single, domiciled with grandmother, with PMH of heart murmur with atrial septal defect (benign), PPH of borderline personality disorder, PTSD, obsessive compulsive sx, with 2 prior IPP admissions and 1 prior suicide attempt at age 15 via pill ingestion, current medical marijuana use for PTSD, presenting for follow up examination.\par \par Today, patient reported some he side effects after inceasing dose of synthroid leading her to decrease her venlafaxine. They report good effect from decrease in venlafaxine and report fair mood. They report that they continue to have good insight into intrusive thoughts, know when to and feel comfortable asking for help. They denied si and hi. pt was future oriented and linear in thought. Currently the patient was not actively suicidal, homicidal, psychotic or manic during the interview.\par \par Risk assessment: Risk factors include history of suicide attempt, personality disorder diagnosis, PTSD symptoms, depressive symptoms, SIB and recent trigger; protective factors include no acute suicidality, future oriented towards completing coursework, social support from family/friends, engaged and compliant with treatment and help seeking behavior. Patient is therefore low to mod acute suicide risk despite her chronically elevated risk.\par

## 2023-05-03 ENCOUNTER — EMERGENCY (EMERGENCY)
Facility: HOSPITAL | Age: 28
LOS: 0 days | Discharge: HOME | End: 2023-05-04
Attending: EMERGENCY MEDICINE
Payer: MEDICAID

## 2023-05-03 VITALS
RESPIRATION RATE: 18 BRPM | SYSTOLIC BLOOD PRESSURE: 135 MMHG | WEIGHT: 171.96 LBS | DIASTOLIC BLOOD PRESSURE: 66 MMHG | OXYGEN SATURATION: 98 % | HEART RATE: 103 BPM | HEIGHT: 59 IN

## 2023-05-03 DIAGNOSIS — Z88.0 ALLERGY STATUS TO PENICILLIN: ICD-10-CM

## 2023-05-03 DIAGNOSIS — Z88.8 ALLERGY STATUS TO OTHER DRUGS, MEDICAMENTS AND BIOLOGICAL SUBSTANCES: ICD-10-CM

## 2023-05-03 DIAGNOSIS — M54.2 CERVICALGIA: ICD-10-CM

## 2023-05-03 DIAGNOSIS — R20.2 PARESTHESIA OF SKIN: ICD-10-CM

## 2023-05-03 DIAGNOSIS — R20.0 ANESTHESIA OF SKIN: ICD-10-CM

## 2023-05-03 DIAGNOSIS — Z91.018 ALLERGY TO OTHER FOODS: ICD-10-CM

## 2023-05-03 DIAGNOSIS — Z88.1 ALLERGY STATUS TO OTHER ANTIBIOTIC AGENTS STATUS: ICD-10-CM

## 2023-05-03 PROCEDURE — 70496 CT ANGIOGRAPHY HEAD: CPT | Mod: MA

## 2023-05-03 PROCEDURE — 36415 COLL VENOUS BLD VENIPUNCTURE: CPT

## 2023-05-03 PROCEDURE — 99285 EMERGENCY DEPT VISIT HI MDM: CPT | Mod: 25

## 2023-05-03 PROCEDURE — 82962 GLUCOSE BLOOD TEST: CPT

## 2023-05-03 PROCEDURE — 85610 PROTHROMBIN TIME: CPT

## 2023-05-03 PROCEDURE — 93005 ELECTROCARDIOGRAM TRACING: CPT

## 2023-05-03 PROCEDURE — 93010 ELECTROCARDIOGRAM REPORT: CPT

## 2023-05-03 PROCEDURE — 71045 X-RAY EXAM CHEST 1 VIEW: CPT

## 2023-05-03 PROCEDURE — 99285 EMERGENCY DEPT VISIT HI MDM: CPT

## 2023-05-03 PROCEDURE — 85730 THROMBOPLASTIN TIME PARTIAL: CPT

## 2023-05-03 PROCEDURE — 70450 CT HEAD/BRAIN W/O DYE: CPT | Mod: MA

## 2023-05-03 PROCEDURE — 80053 COMPREHEN METABOLIC PANEL: CPT

## 2023-05-03 PROCEDURE — 70498 CT ANGIOGRAPHY NECK: CPT | Mod: MA

## 2023-05-03 PROCEDURE — 85025 COMPLETE CBC W/AUTO DIFF WBC: CPT

## 2023-05-03 PROCEDURE — 84484 ASSAY OF TROPONIN QUANT: CPT

## 2023-05-03 PROCEDURE — 0042T: CPT | Mod: MA

## 2023-05-03 NOTE — ED ADULT TRIAGE NOTE - CHIEF COMPLAINT QUOTE
sent in by urgent care for rule out stroke, right hand numbness and aphasic  as per patient approximately 930 pm

## 2023-05-04 VITALS
RESPIRATION RATE: 18 BRPM | DIASTOLIC BLOOD PRESSURE: 59 MMHG | OXYGEN SATURATION: 98 % | HEART RATE: 94 BPM | SYSTOLIC BLOOD PRESSURE: 108 MMHG | TEMPERATURE: 97 F

## 2023-05-04 LAB
ALBUMIN SERPL ELPH-MCNC: 4.4 G/DL — SIGNIFICANT CHANGE UP (ref 3.5–5.2)
ALP SERPL-CCNC: 92 U/L — SIGNIFICANT CHANGE UP (ref 30–115)
ALT FLD-CCNC: 21 U/L — SIGNIFICANT CHANGE UP (ref 0–41)
ANION GAP SERPL CALC-SCNC: 13 MMOL/L — SIGNIFICANT CHANGE UP (ref 7–14)
APTT BLD: 32.6 SEC — SIGNIFICANT CHANGE UP (ref 27–39.2)
AST SERPL-CCNC: 16 U/L — SIGNIFICANT CHANGE UP (ref 0–41)
BASOPHILS # BLD AUTO: 0.05 K/UL — SIGNIFICANT CHANGE UP (ref 0–0.2)
BASOPHILS NFR BLD AUTO: 0.5 % — SIGNIFICANT CHANGE UP (ref 0–1)
BILIRUB SERPL-MCNC: 0.3 MG/DL — SIGNIFICANT CHANGE UP (ref 0.2–1.2)
BUN SERPL-MCNC: 9 MG/DL — LOW (ref 10–20)
CALCIUM SERPL-MCNC: 8.7 MG/DL — SIGNIFICANT CHANGE UP (ref 8.4–10.5)
CHLORIDE SERPL-SCNC: 101 MMOL/L — SIGNIFICANT CHANGE UP (ref 98–110)
CO2 SERPL-SCNC: 23 MMOL/L — SIGNIFICANT CHANGE UP (ref 17–32)
CREAT SERPL-MCNC: 0.7 MG/DL — SIGNIFICANT CHANGE UP (ref 0.7–1.5)
EGFR: 120 ML/MIN/1.73M2 — SIGNIFICANT CHANGE UP
EOSINOPHIL # BLD AUTO: 0.68 K/UL — SIGNIFICANT CHANGE UP (ref 0–0.7)
EOSINOPHIL NFR BLD AUTO: 6.2 % — SIGNIFICANT CHANGE UP (ref 0–8)
GLUCOSE SERPL-MCNC: 152 MG/DL — HIGH (ref 70–99)
HCT VFR BLD CALC: 37.6 % — SIGNIFICANT CHANGE UP (ref 37–47)
HGB BLD-MCNC: 12.5 G/DL — SIGNIFICANT CHANGE UP (ref 12–16)
IMM GRANULOCYTES NFR BLD AUTO: 0.3 % — SIGNIFICANT CHANGE UP (ref 0.1–0.3)
INR BLD: 1.01 RATIO — SIGNIFICANT CHANGE UP (ref 0.65–1.3)
LYMPHOCYTES # BLD AUTO: 1.78 K/UL — SIGNIFICANT CHANGE UP (ref 1.2–3.4)
LYMPHOCYTES # BLD AUTO: 16.3 % — LOW (ref 20.5–51.1)
MCHC RBC-ENTMCNC: 27.2 PG — SIGNIFICANT CHANGE UP (ref 27–31)
MCHC RBC-ENTMCNC: 33.2 G/DL — SIGNIFICANT CHANGE UP (ref 32–37)
MCV RBC AUTO: 81.9 FL — SIGNIFICANT CHANGE UP (ref 81–99)
MONOCYTES # BLD AUTO: 0.84 K/UL — HIGH (ref 0.1–0.6)
MONOCYTES NFR BLD AUTO: 7.7 % — SIGNIFICANT CHANGE UP (ref 1.7–9.3)
NEUTROPHILS # BLD AUTO: 7.52 K/UL — HIGH (ref 1.4–6.5)
NEUTROPHILS NFR BLD AUTO: 69 % — SIGNIFICANT CHANGE UP (ref 42.2–75.2)
NRBC # BLD: 0 /100 WBCS — SIGNIFICANT CHANGE UP (ref 0–0)
PLATELET # BLD AUTO: 271 K/UL — SIGNIFICANT CHANGE UP (ref 130–400)
PMV BLD: 9.3 FL — SIGNIFICANT CHANGE UP (ref 7.4–10.4)
POTASSIUM SERPL-MCNC: 3.6 MMOL/L — SIGNIFICANT CHANGE UP (ref 3.5–5)
POTASSIUM SERPL-SCNC: 3.6 MMOL/L — SIGNIFICANT CHANGE UP (ref 3.5–5)
PROT SERPL-MCNC: 6.6 G/DL — SIGNIFICANT CHANGE UP (ref 6–8)
PROTHROM AB SERPL-ACNC: 11.5 SEC — SIGNIFICANT CHANGE UP (ref 9.95–12.87)
RBC # BLD: 4.59 M/UL — SIGNIFICANT CHANGE UP (ref 4.2–5.4)
RBC # FLD: 12.8 % — SIGNIFICANT CHANGE UP (ref 11.5–14.5)
SODIUM SERPL-SCNC: 137 MMOL/L — SIGNIFICANT CHANGE UP (ref 135–146)
TROPONIN T SERPL-MCNC: <0.01 NG/ML — SIGNIFICANT CHANGE UP
WBC # BLD: 10.9 K/UL — HIGH (ref 4.8–10.8)
WBC # FLD AUTO: 10.9 K/UL — HIGH (ref 4.8–10.8)

## 2023-05-04 PROCEDURE — 70450 CT HEAD/BRAIN W/O DYE: CPT | Mod: 26,MA

## 2023-05-04 PROCEDURE — 0042T: CPT | Mod: MA

## 2023-05-04 PROCEDURE — 70498 CT ANGIOGRAPHY NECK: CPT | Mod: 26,MA

## 2023-05-04 PROCEDURE — 70496 CT ANGIOGRAPHY HEAD: CPT | Mod: 26,MA

## 2023-05-04 PROCEDURE — 71045 X-RAY EXAM CHEST 1 VIEW: CPT | Mod: 26

## 2023-05-04 NOTE — ED PROVIDER NOTE - PATIENT PORTAL LINK FT
You can access the FollowMyHealth Patient Portal offered by [facility] by registering at the following website: [email or web address]. By joining [employer]’s FollowMyHealth portal, you will also be able to view your health information using other applications (apps) compatible with our system.

## 2023-05-04 NOTE — ED PROVIDER NOTE - CARE PROVIDER_API CALL
Augustin Mir)  Neuromuscular Medicine  [address] [place] [address], [address]  [place], [place] [id number]  Phone: [phone]  Fax: [phone]  Follow Up Time:

## 2023-05-04 NOTE — ED PROVIDER NOTE - ATTENDING APP SHARED VISIT CONTRIBUTION OF CARE
pt co tingling to right hand for 2 days, assoc with right sided neck pain, worse with certain positions. no ha, vis or speech changes. no n, v, cp, sob, ab pain. no fever or chills.    stroke code was called by RN for paresthesias.    PE: nad, aaox4, perrl, eomi, neck sup, ctab, rrr, ab soft, nt, nd. Alert and oriented, face symmetric and speech fluent. Strength 5/5 x 4 ext and symmetric, nml gross motor movement, nml RRM/SHONNA/FTN, nml gait. No focal deficits noted.    I am cancelling the stroke code due to sx not c/w stroke and NIHSS 0.

## 2023-05-04 NOTE — ED PROVIDER NOTE - CARE PLAN
Principal Discharge DX:	Paresthesia   1 Principal Discharge DX:	Paresthesia  Assessment and plan of treatment:	paresthesia of RUE, resolved. neck pain.  imaging, labs, supportive care

## 2023-05-04 NOTE — ED PROVIDER NOTE - OBJECTIVE STATEMENT
24 year old female comes to emergency room for right arm pins and needles. patient states she was watching tv with friend and her right arm started to feel numb and cold described as a pins and needles. Friend tried to find pulse and was having trouble. Pt went to urgent care and doctor there said you maybe having stroke and told patient to drive to hospital. Pt states that the arm started to feel better while going to urgent care and symptoms almost completely gone on emergency room arrival, but started to panic when the doctor said she was having a stroke and made it hard to talk at first. no fever chills. no leg weakness.

## 2023-05-04 NOTE — ED ADULT NURSE REASSESSMENT NOTE - NS ED NURSE REASSESS COMMENT FT1
delay going to ct scan, pt was refusing to go until she saw her friend at the bedside.  Friend brought in from waiting room, pt transported to CT.

## 2023-05-17 ENCOUNTER — NON-APPOINTMENT (OUTPATIENT)
Age: 28
End: 2023-05-17

## 2023-05-24 ENCOUNTER — NON-APPOINTMENT (OUTPATIENT)
Age: 28
End: 2023-05-24

## 2023-05-26 ENCOUNTER — APPOINTMENT (OUTPATIENT)
Dept: PEDIATRIC ALLERGY IMMUNOLOGY | Facility: CLINIC | Age: 28
End: 2023-05-26
Payer: MEDICAID

## 2023-05-26 VITALS — BODY MASS INDEX: 34.27 KG/M2 | HEIGHT: 59 IN | WEIGHT: 170 LBS

## 2023-05-26 DIAGNOSIS — Z91.018 ALLERGY TO OTHER FOODS: ICD-10-CM

## 2023-05-26 DIAGNOSIS — Z91.010 ALLERGY TO PEANUTS: ICD-10-CM

## 2023-05-26 PROCEDURE — 99213 OFFICE O/P EST LOW 20 MIN: CPT

## 2023-05-26 NOTE — HISTORY OF PRESENT ILLNESS
[None] : The patient is currently asymptomatic [de-identified] : AUGUSTO REYNOSO is a 27 year old female with a history of mouth, tongue and jaw numbness, itchy throat, wheezing and tightness of the chest when having a nut bar two weeks ago. She took  two Benadryl and went to ER which they gave Dexamethasone and monitored her and sent her home. She did well. Two to three days later she had the same type of reaction and saw her PCP Wednesday last week, she gave Prednisone 20 mg a day for a week plus Benadryl every four hours and recommended allergy evaluation.  \par \par Going back years she used to have upper respiratory allergies, she gets them in the fall and winter months, she also had asthma periodically, always treated and released as an outpatient. Her asthma is aggravated by cold air and exercise. No steady follow up by anyone. \par \par She was staying away from tree nuts and peanuts for quite some time. 2-3 weeks ago she had some vanilla ice cream with m & ms on top as well as caramel. At the same time she had some cookies with almonds, walnuts and cashews. Within 15-30 minutes she began to feel hot sensation, itchy chest, throat, lumpy feeling in her throat and numbness of her tongue and lip. She took Benadryl and rushed to the ER where they administered Dexamethasone, observed for 4 hours. Her PCP gave her Prednisone 20mg daily for a week. I had  ordered screening [employer] for various peanuts an Tree nuts which was essentially negative despite her reactions. She has an EpiPen and knows how to use it. I had also recommended Zyrtec 10 mg daily. She haD been doing well. She had recent blood work done and was put on Metformin 500 mg daily. \par \par A  week ago she ate a cupcake which had a peanut butter chip and had some itchy throat, self limiting. The next day she had coffee and they gave her almond milk, she felt lump in her throat and administered EpiPen and went to ER. She was observed and left.

## 2023-05-26 NOTE — ASSESSMENT
[FreeTextEntry1] : The patient is a 27 year female. I have strongly Recommend she seek consultation from  [facility] [place] for possible food challenge. \par \par

## 2023-05-29 ENCOUNTER — EMERGENCY (EMERGENCY)
Facility: HOSPITAL | Age: 28
LOS: 0 days | Discharge: HOME | End: 2023-05-29
Attending: EMERGENCY MEDICINE
Payer: MEDICAID

## 2023-05-29 VITALS
HEART RATE: 98 BPM | SYSTOLIC BLOOD PRESSURE: 130 MMHG | DIASTOLIC BLOOD PRESSURE: 74 MMHG | OXYGEN SATURATION: 99 % | RESPIRATION RATE: 18 BRPM | HEIGHT: 59 IN | TEMPERATURE: 98 F

## 2023-05-29 DIAGNOSIS — F32.A DEPRESSION, UNSPECIFIED: ICD-10-CM

## 2023-05-29 DIAGNOSIS — K58.9 IRRITABLE BOWEL SYNDROME WITHOUT DIARRHEA: ICD-10-CM

## 2023-05-29 DIAGNOSIS — Z88.1 ALLERGY STATUS TO OTHER ANTIBIOTIC AGENTS STATUS: ICD-10-CM

## 2023-05-29 DIAGNOSIS — Z88.0 ALLERGY STATUS TO PENICILLIN: ICD-10-CM

## 2023-05-29 DIAGNOSIS — Z91.018 ALLERGY TO OTHER FOODS: ICD-10-CM

## 2023-05-29 DIAGNOSIS — Z88.8 ALLERGY STATUS TO OTHER DRUGS, MEDICAMENTS AND BIOLOGICAL SUBSTANCES: ICD-10-CM

## 2023-05-29 DIAGNOSIS — K08.89 OTHER SPECIFIED DISORDERS OF TEETH AND SUPPORTING STRUCTURES: ICD-10-CM

## 2023-05-29 DIAGNOSIS — F60.3 BORDERLINE PERSONALITY DISORDER: ICD-10-CM

## 2023-05-29 DIAGNOSIS — F43.10 POST-TRAUMATIC STRESS DISORDER, UNSPECIFIED: ICD-10-CM

## 2023-05-29 PROCEDURE — 99283 EMERGENCY DEPT VISIT LOW MDM: CPT

## 2023-05-29 RX ORDER — KETOROLAC TROMETHAMINE 30 MG/ML
1 SYRINGE (ML) INJECTION
Qty: 12 | Refills: 0
Start: 2023-05-29 | End: 2023-05-31

## 2023-05-29 NOTE — ED PROVIDER NOTE - PHYSICAL EXAMINATION
--EXAM--  VITAL SIGNS: I have reviewed vs documented at present.  CONSTITUTIONAL: Well-developed; well-nourished; in no acute distress.   SKIN: Warm and dry, no acute rash.   HEAD: Normocephalic; atraumatic.  EYES: PERRL, EOM intact; conjunctiva and sclera clear. No nystagmus.  ENT: No nasal discharge; airway clear.  Left upper mild swelling mild tenderness no fluctuance

## 2023-05-29 NOTE — ED PROVIDER NOTE - PATIENT PORTAL LINK FT
You can access the FollowMyHealth Patient Portal offered by [facility] by registering at the following website: [email or web address]. By joining [facility]’s FollowMyHealth portal, you will also be able to view your health information using other applications (apps) compatible with our system.

## 2023-05-29 NOTE — ED ADULT TRIAGE NOTE - CHIEF COMPLAINT QUOTE
As per patient for three days my left lower lip has been numb and it's extending down to my jaw. My left side of my face hurts too, I think it might be my wisdom tooth.

## 2023-05-29 NOTE — ED PROVIDER NOTE - NSFOLLOWUPCLINICS_GEN_ALL_ED_FT
[facility]  Dental  [address] [place] [id number]  Phone: [phone]  Fax:

## 2023-05-29 NOTE — ED PROVIDER NOTE - NS ED MD DISPO DISCHARGE
" Emergency Department TeleTriage Encounter Note      CHIEF COMPLAINT    Chief Complaint   Patient presents with    Leg Pain     Pt c/o L calf pain X 3 days, denies any injury. Increased pain my bending knee, pt states "I think it is arthritis"          VITAL SIGNS   Initial Vitals [09/12/22 1358]   BP Pulse Resp Temp SpO2   (!) 174/103 99 18 98.6 °F (37 °C) 98 %      MAP       --            ALLERGIES    Review of patient's allergies indicates:  No Known Allergies    PROVIDER TRIAGE NOTE  This is a teletriage evaluation of a 52 y.o. female presenting to the ED complaining of leg pain. Patient reports atraumatic left calf pain. She reports increased pain in the calf when she sits down or bends her knee. She denies swelling.     Initial orders will be placed and care will be transferred to an alternate provider when patient is roomed for a full evaluation. Any additional orders and the final disposition will be determined by that provider.         ORDERS  Labs Reviewed - No data to display    ED Orders (720h ago, onward)      Start Ordered     Status Ordering Provider    09/12/22 1440 09/12/22 1440  US Lower Extremity Veins Left  1 time imaging         Ordered PINO VANCE              Virtual Visit Note: The provider triage portion of this emergency department evaluation and documentation was performed via [employer], a HIPAA-compliant telemedicine application, in concert with a tele-presenter in the room. A face to face patient evaluation with one of my colleagues will occur once the patient is placed in an emergency department room.      DISCLAIMER: This note was prepared with [employer] voice recognition transcription software. Garbled syntax, mangled pronouns, and other bizarre constructions may be attributed to that software system.    " Home

## 2023-05-29 NOTE — ED PROVIDER NOTE - NSFOLLOWUPINSTRUCTIONS_ED_ALL_ED_FT
Toothache    WHAT YOU NEED TO KNOW:    A toothache is pain that is caused by irritation of the nerves in the center of your tooth. The irritation may be caused by several problems, such as a cavity, an infection, a cracked tooth, or gum disease. Tooth Anatomy         DISCHARGE INSTRUCTIONS:    Return to the emergency department if:     You have trouble breathing or swallowing.       You have swelling in your face or neck.     Contact your dentist if:     You have a fever and chills.       You have trouble opening or closing your mouth.       You have swelling around your tooth.       You have questions or concerns about your condition or care.    Medicines: You may need any of the following:     NSAIDs, such as ibuprofen, help decrease swelling, pain, and fever. This medicine is available with or without a doctor's order. NSAIDs can cause stomach bleeding or kidney problems in certain people. If you take blood thinner medicine, always ask if NSAIDs are safe for you. Always read the medicine label and follow directions. Do not give these medicines to children under 6 months of age without direction from your child's healthcare provider.      Acetaminophen decreases pain and fever. It is available without a doctor's order. Ask how much to take and how often to take it. Follow directions. Acetaminophen can cause liver damage if not taken correctly.      Prescription pain medicine may be given. Ask your healthcare provider how to take this medicine safely. Some prescription pain medicines contain acetaminophen. Do not take other medicines that contain acetaminophen without talking to your healthcare provider. Too much acetaminophen may cause liver damage. Prescription pain medicine may cause constipation. Ask your healthcare provider how to prevent or treat constipation.       Antibiotics help treat or prevent a bacterial infection.       Take your medicine as directed. Contact your healthcare provider if you think your medicine is not helping or if you have side effects. Tell him of her if you are allergic to any medicine. Keep a list of the medicines, vitamins, and herbs you take. Include the amounts, and when and why you take them. Bring the list or the pill bottles to follow-up visits. Carry your medicine list with you in case of an emergency.    Self-care:     Rinse your mouth with warm salt water 4 times a day or as directed.       Eat soft foods to help relieve pain caused by chewing.       Apply ice on your jaw or cheek for 15 to 20 minutes every hour or as directed. Use an ice pack, or put crushed ice in a plastic bag. Cover it with a towel before you apply it. Ice helps prevent tissue damage and decreases swelling and pain.    Help prevent a toothache:     Brush your teeth at least 2 times a day.      Use dental floss to clean between your teeth at least 1 time a day.      See your dentist regularly every 6 months for dental cleanings and oral exams.    Follow up with your dentist as directed: You may be referred to a dental surgeon. Write down your questions so you remember to ask them during your visits.        © Copyright [employer] 2019 All illustrations and images included in CareNotes are the copyrighted property of A.D.A.M., Inc. or [employer].

## 2023-05-29 NOTE — ED PROVIDER NOTE - OBJECTIVE STATEMENT
76-year-old female presents to the ED for evaluation of left upper dental pain.  Patient is currently taking Bactrim.  Patient was placed on Bactrim by telemedicine doctor.

## 2023-05-29 NOTE — ED ADULT NURSE NOTE - NS ED NURSE DISCH DISPOSITION
Urine culture (+) for Group D Enterococcus, initiated Rocephin.  Ongoing nausea, minimal PO intake, made inpatient for further evaluation and treatment   Discharged

## 2023-05-29 NOTE — ED ADULT TRIAGE NOTE - HEIGHT IN CM
Urology 11/29/2020 7:05 AM    Patient undergoing voiding trial tomorrow.  Please chart all voided output, bladder scans, and straight catheterization output.  Will follow voiding through the day.      Urine Culture Urine, Bacterial Culture (no units)   Date Value   11/25/2020 10,000 TO 50,000 CFU/mL Enterococcus faecalis (A)   11/25/2020 (A)    10,000 TO 50,000 CFU/mL Enterobacter cloacae complex          Diagnosis/Comorbidities/Complications:  · UTI/sepsis  · Hydronephrosis despite stent s/p stent removal and placement of tandem stents 11/25  · Urinary retention      Plans/Recommendations:  · Continue abx per ID   · Diagnostic ureteroscopy with  once patient has discharged   · Plan for a voiding trial tomorrow  Bladder scan after patient voids x 3.   If > 350 ml or no void please straight cath x 2 and replace lee on 3rd occasion.   If scans are < 350 x 3 you can discontinue checking scans unless patient has symptoms.       We will not see patient over the weekend. Please call the answering service to have Urology paged if urologic needs arise.         ADRIANNA Jose  (Available:  Mon/Tues/Wed)  Pager:  [phone]    Please page urology PA/NP with questions on weekdays between the hours of 7A-7P. Please page the on call urologist on nights and weekends.    ** If you have paged me and have not had paged returned in 15 minutes- Please page [phone]**    Consulting Urologist:  Eb Manning -[phone]    Agree  CBC and BMP in AM    Eb Manning MD  [facility]  Pager [phone]  Office [phone]          149.86

## 2023-05-29 NOTE — ED PROVIDER NOTE - CLINICAL SUMMARY MEDICAL DECISION MAKING FREE TEXT BOX
27-year-old female presents with left upper dental discomfort.  Currently on Bactrim for this.  States that she had swelling and discharge couple days ago which is since decided locally around the area of pain.  No trouble breathing or any other swelling.  Has been having difficulty getting to see a dentist due to insurance reasons.  No neck stiffness, headache, or any other symptoms.  On exam nontoxic, vital signs noted, upper and lower left premolars with mild tenderness to percussion and do not appear to be surrounded by any fluctuance, bulging or discharge.  No signs of drainable abscess at this time.  Possible periapical infection but already on antibiotics and limited choices given her allergy list.  Oropharyngeal exam no exudates or swelling, neck supple, no stridor.  No signs of airway compromise.  Advised that she can follow-up with dental clinic and [facility].  Return precaution discussed.

## 2023-05-29 NOTE — ED ADULT NURSE NOTE - NSFALLUNIVINTERV_ED_ALL_ED
Assistance OOB with selected safe patient handling equipment if applicable/Bed/Stretcher in lowest position, wheels locked, appropriate side rails in place/Call bell, personal items and telephone in reach/Instruct patient to call for assistance before getting out of bed/chair/stretcher/Non-slip footwear applied when patient is off stretcher/[place] to call system/Physically safe environment - no spills, clutter or unnecessary equipment/Purposeful proactive rounding/Room/bathroom lighting operational, light cord in reach

## 2023-06-01 ENCOUNTER — APPOINTMENT (OUTPATIENT)
Dept: PSYCHIATRY | Facility: CLINIC | Age: 28
End: 2023-06-01
Payer: COMMERCIAL

## 2023-06-01 ENCOUNTER — OUTPATIENT (OUTPATIENT)
Dept: OUTPATIENT SERVICES | Facility: HOSPITAL | Age: 28
LOS: 1 days | End: 2023-06-01
Payer: COMMERCIAL

## 2023-06-01 DIAGNOSIS — F32.A DEPRESSION, UNSPECIFIED: ICD-10-CM

## 2023-06-01 DIAGNOSIS — F60.3 BORDERLINE PERSONALITY DISORDER: ICD-10-CM

## 2023-06-01 PROCEDURE — ZZZZZ: CPT

## 2023-06-01 PROCEDURE — 99214 OFFICE O/P EST MOD 30 MIN: CPT | Mod: 95

## 2023-06-01 PROCEDURE — 90832 PSYTX W PT 30 MINUTES: CPT

## 2023-06-01 NOTE — REASON FOR VISIT
[Patient preference] : as per patient preference [Telephone (audio) - Individual/Group] : This telephonic visit was provided via audio only technology. [This encounter was initiated by telehealth (audio with video) and converted to telephone (audio only) due to technical difficulties.] : This encounter was initiated by telehealth (audio with video) and converted to telephone (audio only) due to technical difficulties. [Medical Office: ([facility])___] : The provider was located at the medical office in [unfilled]. [Home] : The patient, [unfilled], was located at home, [unfilled], at the time of the visit. [Patient] : Patient [FreeTextEntry1] : Med management

## 2023-06-01 NOTE — PLAN
[No] : No [Medication education provided] : Medication education provided. [Rationale for medication choices, possible risks/precautions, benefits, alternative treatment choices, and consequences of non-treatment discussed] : Rationale for medication choices, possible risks/precautions, benefits, alternative treatment choices, and consequences of non-treatment discussed with patient/family/caregiver  [FreeTextEntry4] : To keep bpd, depression, ptsd and anxiety under control [FreeTextEntry5] : - continue Effexor XR to 150mg po daily to target anxiety and depression\par - decrease Quetiapine extended release to 200mg PO QHS\par - start abilify 10mg po q daily, as per Filipino Provider Antipsychtoic switching tool (https://www.nps.org.au/Samoan-prescriber/articles/imdemttixbsra-ipmjyowqr-cqgf) \par - Defer Gabapentin 300mg po BID to patient's other doctors to target her pain symptoms\par - Continue Hydroxyzine 25mg -50mg PO BID PRN for anxiety/ irritability \par - Continue with school therapist and support group

## 2023-06-01 NOTE — PHYSICAL EXAM
[Cooperative] : cooperative [Clear] : clear [Linear/Goal Directed] : linear/goal directed [None Reported] : none reported [Average] : average [WNL] : within normal limits [FreeTextEntry1] : rebecca [FreeTextEntry8] : "not great" [de-identified] : sad, constricted, congruent [de-identified] : good [de-identified] : fair

## 2023-06-01 NOTE — SOCIAL HISTORY
[FreeTextEntry1] : 2 previous engagements, in same-sex relationship since 10/2018. Currently unemployed, Former [employer] ; Studying for bachelors in psychology and minor in women and gender studies, to graduate 2023. Mom in [place], Dad in [place]. Has 7 siblings, raised 5 siblings. Has friends. currently working as pharmacy tech. Domiciled with grandmother in [facility]. \par \par Description: medicinal marijuana for PTSD; 0.5 cartridge/month with daily use

## 2023-06-01 NOTE — DISCUSSION/SUMMARY
[FreeTextEntry1] : Patient is a 26 y/o F (identifies as "Julio," non-binary, they/them pronouns), recently single, domiciled with grandmother, with PMH of heart murmur with atrial septal defect (benign), PPH of borderline personality disorder, PTSD, obsessive compulsive sx, with 2 prior IPP admissions and 1 prior suicide attempt at age 15 via pill ingestion, current medical marijuana use for PTSD, presenting for follow up examination.\par \par Today, patient reported some increased intrusive thoughts along with some increased depressive sx. Patient was offered increase and venlafaxine but refused despite education and requested trial of abilify over seroquel. Risks were discussed at length with patient, patient expressed understanding and was agreeable to cross taper. Patient reports she will have friends over while she cross tapers in order to look after her. Despite her intrusive thoughts, she denied plan or intent, continues to have good insight, is not actively suicidal, homicidal, psychotic or manic during the interview.\par \par Risk assessment: Risk factors include history of suicide attempt, personality disorder diagnosis, PTSD symptoms, depressive symptoms, SIB and recent trigger; protective factors include no acute suicidality, future oriented towards completing coursework, social support from family/friends, engaged and compliant with treatment and help seeking behavior. Patient is therefore low to mod acute suicide risk despite her chronically elevated risk.\par
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
## 2023-06-07 NOTE — DISCUSSION/SUMMARY
[FreeTextEntry1] : Patient called and left  requesting callback. \par Patient reports that she does not know if abilify is agreeing with her. she reports that she had difficulty falling asleep and felt somewhat restless. It is unclear if her difficulty with sleep was due to questionable activation from abilify or due to decrease of seroquel. \par After discussion with patient, it was agreed to hold abilify tonight and to start at a lower dose of 2mg the following day. \par Additionally, quetiapine was changed to 300mg.\par Patient expressed understanding, agreement and denied acute safety concerns. Patient has a follow up appointment with provider next thursday 6/15. \par

## 2023-06-09 ENCOUNTER — NON-APPOINTMENT (OUTPATIENT)
Age: 28
End: 2023-06-09

## 2023-06-09 NOTE — DISCUSSION/SUMMARY
[FreeTextEntry1] : Patient had called yesterday 6/8 requesting callback. \par Patient was called 6/8 evening and reported that she had copious vomiting the night before requiring avisit to [facility] where she was rehydrated and discharged. She reports she believes it may be due to abilify but reports she had not taken abilify since 6/7 am. Patient agreed to hold off on abilify trial. \par \par Provider called patient today 6/9 to follow up. \par Patient reports she feels quite tired, reporting she restarted her seroquel 400mg. She reports that she would like some time to rest before making adjustments, stating she still feels physically ill. She reports she had not had time to worry about si due to her physical discomfort. She denied acute concerns, agreed to reach out to provider or seek out help in case of emergency. Patient scheduled for follow up thursday 6/15

## 2023-06-14 LAB
ALBUMIN SERPL ELPH-MCNC: 4.4 G/DL
ALP BLD-CCNC: 114 U/L
ALT SERPL-CCNC: 15 U/L
ANION GAP SERPL CALC-SCNC: 13 MMOL/L
AST SERPL-CCNC: 15 U/L
BASOPHILS # BLD AUTO: 0.06 K/UL
BASOPHILS NFR BLD AUTO: 0.6 %
BILIRUB SERPL-MCNC: 0.2 MG/DL
BUN SERPL-MCNC: 8 MG/DL
CALCIUM SERPL-MCNC: 9.3 MG/DL
CHLORIDE SERPL-SCNC: 102 MMOL/L
CHOLEST SERPL-MCNC: 228 MG/DL
CO2 SERPL-SCNC: 25 MMOL/L
CREAT SERPL-MCNC: 0.8 MG/DL
EGFR: 102 ML/MIN/1.73M2
EOSINOPHIL # BLD AUTO: 0.7 K/UL
EOSINOPHIL NFR BLD AUTO: 6.7 %
ESTIMATED AVERAGE GLUCOSE: 128 MG/DL
GLUCOSE SERPL-MCNC: 121 MG/DL
HBA1C MFR BLD HPLC: 6.1 %
HCT VFR BLD CALC: 41.4 %
HDLC SERPL-MCNC: 38 MG/DL
HGB BLD-MCNC: 13.6 G/DL
IMM GRANULOCYTES NFR BLD AUTO: 0.2 %
LDLC SERPL CALC-MCNC: 150 MG/DL
LYMPHOCYTES # BLD AUTO: 1.76 K/UL
LYMPHOCYTES NFR BLD AUTO: 16.7 %
MAN DIFF?: NORMAL
MCHC RBC-ENTMCNC: 27.5 PG
MCHC RBC-ENTMCNC: 32.9 G/DL
MCV RBC AUTO: 83.8 FL
MONOCYTES # BLD AUTO: 0.71 K/UL
MONOCYTES NFR BLD AUTO: 6.7 %
NEUTROPHILS # BLD AUTO: 7.27 K/UL
NEUTROPHILS NFR BLD AUTO: 69.1 %
NONHDLC SERPL-MCNC: 190 MG/DL
PLATELET # BLD AUTO: 293 K/UL
POTASSIUM SERPL-SCNC: 3.9 MMOL/L
PROT SERPL-MCNC: 7.4 G/DL
RBC # BLD: 4.94 M/UL
RBC # FLD: 14 %
SODIUM SERPL-SCNC: 140 MMOL/L
TRIGL SERPL-MCNC: 199 MG/DL
TSH SERPL-ACNC: 2.88 UIU/ML
WBC # FLD AUTO: 10.52 K/UL

## 2023-06-15 ENCOUNTER — OUTPATIENT (OUTPATIENT)
Dept: OUTPATIENT SERVICES | Facility: HOSPITAL | Age: 28
LOS: 1 days | End: 2023-06-15
Payer: COMMERCIAL

## 2023-06-15 ENCOUNTER — APPOINTMENT (OUTPATIENT)
Dept: PSYCHIATRY | Facility: CLINIC | Age: 28
End: 2023-06-15
Payer: COMMERCIAL

## 2023-06-15 DIAGNOSIS — F60.3 BORDERLINE PERSONALITY DISORDER: ICD-10-CM

## 2023-06-15 DIAGNOSIS — F32.A DEPRESSION, UNSPECIFIED: ICD-10-CM

## 2023-06-15 PROCEDURE — 99214 OFFICE O/P EST MOD 30 MIN: CPT | Mod: 95

## 2023-06-15 PROCEDURE — ZZZZZ: CPT

## 2023-06-15 RX ORDER — ARIPIPRAZOLE 2 MG/1
2 TABLET ORAL
Qty: 14 | Refills: 0 | Status: COMPLETED | COMMUNITY
Start: 2023-06-01 | End: 2023-06-08

## 2023-06-15 NOTE — DISCUSSION/SUMMARY
[FreeTextEntry1] : Patient is a 26 y/o F (identifies as "Julio," non-binary, they/them pronouns), recently single, domiciled with grandmother, with PMH of heart murmur with atrial septal defect (benign), PPH of borderline personality disorder, PTSD, obsessive compulsive sx, with 2 prior IPP admissions and 1 prior suicide attempt at age 15 via pill ingestion, current medical marijuana use for PTSD, presenting for follow up examination.\par \par Today, patient reported that she is back to seroquel 400mg and stopped abilify. Patient reported iintrusive thoughts are under better control today but continued feelings of depression and after discussion, patient was agreeable to continued optimization of venlafaxine with further optimization. Despite her intrusive thoughts, she denied plan or intent, continues to have good insight, is not actively suicidal, homicidal, psychotic or manic during the interview.\par \par Risk assessment: Risk factors include history of suicide attempt, personality disorder diagnosis, PTSD symptoms, depressive symptoms, SIB and recent trigger; protective factors include no acute suicidality, future oriented towards completing coursework, social support from family/friends, engaged and compliant with treatment and help seeking behavior. Patient is therefore low to mod acute suicide risk despite her chronically elevated risk.\par

## 2023-06-15 NOTE — PHYSICAL EXAM
[Cooperative] : cooperative [Clear] : clear [Linear/Goal Directed] : linear/goal directed [None Reported] : none reported [WNL] : within normal limits [Average] : average [FreeTextEntry1] : overweight [FreeTextEntry8] : "okay, better" [de-identified] : sad, constricted, congruent [de-identified] : good [de-identified] : fair

## 2023-06-15 NOTE — PLAN
[Medication education provided] : Medication education provided. [Rationale for medication choices, possible risks/precautions, benefits, alternative treatment choices, and consequences of non-treatment discussed] : Rationale for medication choices, possible risks/precautions, benefits, alternative treatment choices, and consequences of non-treatment discussed with patient/family/caregiver  [FreeTextEntry4] : To keep bpd, depression, ptsd and anxiety under control [FreeTextEntry5] : - increase Effexor XR to 150mg + 37.5mg po daily to target anxiety and depression\par - Continue  Quetiapine extended release 400mg PO QHS\par - stopped abilify\par - Defer Gabapentin 300mg po BID to patient's other doctors to target her pain symptoms\par - Continue Hydroxyzine 25mg -50mg PO BID PRN for anxiety/ irritability \par - Continue with school therapist and support group
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
## 2023-06-15 NOTE — SOCIAL HISTORY
[FreeTextEntry1] : 2 previous engagements, in same-sex relationship since 10/2018. Currently unemployed, Former [employer] ; Studying for bachelors in psychology and minor in women and gender studies, to graduate 2023. Mom in [place], Dad in [place]. Has 7 siblings, raised 5 siblings. Has friends. currently working as pharmacy tech. Domiciled with grandmother in [facility]. \par \par Description: medicinal marijuana for PTSD; 0.5 cartridge/month with daily use

## 2023-06-15 NOTE — REASON FOR VISIT
[Patient preference] : as per patient preference [Medical Office: ([facility])___] : The provider was located at the medical office in [unfilled]. [Home] : The patient, [unfilled], was located at home, [unfilled], at the time of the visit. [Patient] : Patient [Telehealth (audio & video) - Individual/Group] : This visit was provided via telehealth using real-time 2-way audio visual technology. [FreeTextEntry1] : Med management

## 2023-06-16 DIAGNOSIS — F60.3 BORDERLINE PERSONALITY DISORDER: ICD-10-CM

## 2023-06-16 DIAGNOSIS — F32.A DEPRESSION, UNSPECIFIED: ICD-10-CM

## 2023-06-21 ENCOUNTER — EMERGENCY (EMERGENCY)
Facility: HOSPITAL | Age: 28
LOS: 0 days | Discharge: HOME | End: 2023-06-21
Attending: EMERGENCY MEDICINE
Payer: MEDICAID

## 2023-06-21 VITALS
TEMPERATURE: 98 F | SYSTOLIC BLOOD PRESSURE: 130 MMHG | WEIGHT: 169.98 LBS | RESPIRATION RATE: 18 BRPM | HEIGHT: 59 IN | OXYGEN SATURATION: 99 % | HEART RATE: 94 BPM | DIASTOLIC BLOOD PRESSURE: 83 MMHG

## 2023-06-21 DIAGNOSIS — R05.1 ACUTE COUGH: ICD-10-CM

## 2023-06-21 DIAGNOSIS — R06.02 SHORTNESS OF BREATH: ICD-10-CM

## 2023-06-21 DIAGNOSIS — Z91.018 ALLERGY TO OTHER FOODS: ICD-10-CM

## 2023-06-21 DIAGNOSIS — Z88.8 ALLERGY STATUS TO OTHER DRUGS, MEDICAMENTS AND BIOLOGICAL SUBSTANCES: ICD-10-CM

## 2023-06-21 DIAGNOSIS — Z88.1 ALLERGY STATUS TO OTHER ANTIBIOTIC AGENTS STATUS: ICD-10-CM

## 2023-06-21 DIAGNOSIS — Z88.0 ALLERGY STATUS TO PENICILLIN: ICD-10-CM

## 2023-06-21 DIAGNOSIS — J45.901 UNSPECIFIED ASTHMA WITH (ACUTE) EXACERBATION: ICD-10-CM

## 2023-06-21 PROCEDURE — 71046 X-RAY EXAM CHEST 2 VIEWS: CPT

## 2023-06-21 PROCEDURE — 93005 ELECTROCARDIOGRAM TRACING: CPT

## 2023-06-21 PROCEDURE — 71046 X-RAY EXAM CHEST 2 VIEWS: CPT | Mod: 26

## 2023-06-21 PROCEDURE — 94640 AIRWAY INHALATION TREATMENT: CPT

## 2023-06-21 PROCEDURE — 99284 EMERGENCY DEPT VISIT MOD MDM: CPT

## 2023-06-21 PROCEDURE — 99285 EMERGENCY DEPT VISIT HI MDM: CPT | Mod: 25

## 2023-06-21 PROCEDURE — 93010 ELECTROCARDIOGRAM REPORT: CPT | Mod: 76

## 2023-06-21 RX ORDER — IPRATROPIUM/ALBUTEROL SULFATE 18-103MCG
3 AEROSOL WITH ADAPTER (GRAM) INHALATION
Refills: 0 | Status: COMPLETED | OUTPATIENT
Start: 2023-06-21 | End: 2023-06-21

## 2023-06-21 RX ADMIN — Medication 3 MILLILITER(S): at 21:28

## 2023-06-21 RX ADMIN — Medication 3 MILLILITER(S): at 21:54

## 2023-06-21 RX ADMIN — Medication 3 MILLILITER(S): at 21:45

## 2023-06-21 NOTE — ED ADULT TRIAGE NOTE - TEMPERATURE IN CELSIUS (DEGREES C)
"Chief Complaint   Patient presents with     Contraception     RECHECK       Initial /64 (BP Location: Right arm, Patient Position: Sitting, Cuff Size: Adult Regular)   Pulse 96   Temp 98.3  F (36.8  C) (Oral)   Ht 1.664 m (5' 5.5\")   Wt 79.8 kg (176 lb)   SpO2 97%   BMI 28.84 kg/m   Estimated body mass index is 28.84 kg/m  as calculated from the following:    Height as of this encounter: 1.664 m (5' 5.5\").    Weight as of this encounter: 79.8 kg (176 lb).  BP completed using cuff size: regular    Questioned patient about current smoking habits.  Pt. has never smoked.          The following HM Due: NONE      The following patient reported/Care Every where data was sent to:  P ABSTRACT QUALITY INITIATIVES [30703]  n/a      n/a and patient has appointment for today     Kamala Harris is a 19 year old female who presents today for evaluation of breakthrough bleeding on nexplanon.   Had it placed 3/5/19 at her post partum visit.   Still breast feeding,  milk supply going down.  Still making enough, though.     Bled from 3/18 --> 4/5 and again from --> 5/4 (a bit lighter flow)  Then  until the present time  Each bleeding episode is a bit lighter.   Currently her flow is more like spotting.   She has no other symptoms or unpleasant side effects from the nexplanon and really does not want to get pregnant.     Working 20 hrs a week at [employer]    Wt Readings from Last 4 Encounters:   19 79.8 kg (176 lb)   19 77.2 kg (170 lb 3.2 oz)   19 93.3 kg (205 lb 9.6 oz)   19 94.3 kg (208 lb)            OBJECTIVE:   /64 (BP Location: Right arm, Patient Position: Sitting, Cuff Size: Adult Regular)   Pulse 96   Temp 98.3  F (36.8  C) (Oral)   Ht 1.664 m (5' 5.5\")   Wt 79.8 kg (176 lb)   SpO2 97%   BMI 28.84 kg/m     Gen: no apparent distress   Patient is alert and oriented times three.   normal respiratory and cardiovascular effort      Pelvic: deferred. "       ASSESSMENT:  Encounter Diagnosis   Name Primary?     Breakthrough bleeding on Nexplanon Yes      Weight gain   Milk supply decrease     PLAN:   Reviewed normal expectations with use of nexplanon and possible side effects.   Since her bleeding is decreasing with time, she was encouraged to continue to wait it out.  Might actually stop altogether.   discussed option of oral contraceptive pills but that might not be a permanent fix   expectant management was suggested and patient is comfortable with that plan for now.       All of her questions were fully answered.  Birgit Montesinos             36.9

## 2023-06-21 NOTE — ED PROVIDER NOTE - INTERNATIONAL TRAVEL
PT DAILY TREATMENT NOTE - [facility]     Patient Name: Shavonne Pretty  Date:2018  : 1950  [x]  Patient  Verified  Payor: Nanette Vargas / Plan: [place] MED[place]ARE PART A & B / Product Type: Medicare /    In time:8:30  Out time:9:16  Total Treatment Time (min): 46  Total Timed Codes (min): 46  1:1 Treatment Time ( [address] only): 40   Visit #: 15 of 16    Treatment Area: Left shoulder pain [M25.512]    SUBJECTIVE  Pain Level (0-10 scale): 0  Any medication changes, allergies to medications, adverse drug reactions, diagnosis change, or new procedure performed?: [x] No    [] Yes (see summary sheet for update)  Subjective functional status/changes:   [] No changes reported  Pt reports that her arm continues to have difficulty lifting her arm up over her head.      OBJECTIVE        Min Type Additional Details    [] Estim:  []Unatt       []IFC  []Premod                        []Other:  []w/ice   []w/heat  Position:  Location:    [] Estim: []Att    []TENS instruct  []NMES                    []Other:  []w/US   []w/ice   []w/heat  Position:  Location:    []  Traction: [] Cervical       []Lumbar                       [] Prone          []Supine                       []Intermittent   []Continuous Lbs:  [] before manual  [] after manual    []  Ultrasound: []Continuous   [] Pulsed                           []1MHz   []3MHz W/cm2:  Location:    []  Iontophoresis with dexamethasone         Location: [] Take home patch   [] In clinic    []  Ice     []  heat  []  Ice massage  []  Laser   []  Anodyne Position:  Location:    []  Laser with stim  []  Other:  Position:  Location:    []  Vasopneumatic Device Pressure:       [] lo [] med [] hi   Temperature: [] lo [] med [] hi   [] Skin assessment post-treatment:  []intact []redness- no adverse reaction    []redness  adverse reaction:              21 min Therapeutic Exercise:  [x] See flow sheet :   Rationale: increase ROM and increase strength to improve the patients ability to increase tolerance to activities.         25 min Neuromuscular Re-education:  [x]  See flow sheet :scapular stabilization. Rationale: increase ROM and increase strength  to improve the patients ability to increase ease with lifting activities.               With   [] TE   [] TA   [] neuro   [] other: Patient Education: [x] Review HEP    [] Progressed/Changed HEP based on:   [] positioning   [] body mechanics   [] transfers   [] heat/ice application    [] other:      Other Objective/Functional Measures:      Pain Level (0-10 scale) post treatment: 0    ASSESSMENT/Changes in Function: Continue to progress on strengthening scapular stabilizer muscle to help decrease UT substations with flexion and scaption. Patient will continue to benefit from skilled PT services to modify and progress therapeutic interventions, address functional mobility deficits, address ROM deficits, address strength deficits and analyze and address soft tissue restrictions to attain remaining goals. []  See Plan of Care  []  See progress note/recertification  []  See Discharge Summary         Progress towards goals / Updated goals:  Short Term Goals: To be accomplished in 3 weeks:  1. Pt will be independent and complaint w/ HEP to progress gains in PT. At PN: Goal met: Pt reports performing HEP. 6/1/18  2. Pt will improve PROM to full flex and scap left shoulder for ease w/ sleeping. At PN:  Goal met, Flexion 155 degrees, Scpation 161 degrees, 7/13/2018  3. Pt will improve AAROM to > or = to 120 deg flex and scap as measured on pulleys for ease w/ dressing. At PN: Goal met: AAROM flexion and scaption 125 deg with pulleys. 6/14/18  Long Term Goals: To be accomplished in 8 weeks:  1. Pt will improve FOTO to > or = to 63 to demo improved function. At PN:  FOTO = 50, increased by 14 since [facility]. 7/6/18  2. Pt will improve AROM left shoulder flex and scap to > or = to 140 deg for ease w/ doing her hair.    At PN: AROM flexion 63 deg, scap 54 deg. 7/10/18  Current: remains, AROM flexion 63 deg, scap 54 deg. 7/19/18  3. Pt will improve AROM right shoulder functional ER to > or = to occiput for ease putting on her shirt. At eval:Left Shoulder Functional ER = Left Ear, 7/13/2018  4. Pt will improve MMT left shoulder to > or = to -4-4/5 grossly for ease w/ return to unrestricted ADLs.    At Eval: Daniel Rad 3-/5, abd 3-/5, IR 3/5, ER 4/5 7/13/18       PLAN  []  Upgrade activities as tolerated     [x]  Continue plan of care  []  Update interventions per flow sheet       []  Discharge due to:_  []  Other:_      Rajatjennifer Looney, PTA 7/24/2018  8:32 AM    Future Appointments  Date Time Provider Claire Johnson   7/26/2018 8:30 AM Jarod Carrington, PT [facility] No

## 2023-06-21 NOTE — ED PROVIDER NOTE - ATTENDING APP SHARED VISIT CONTRIBUTION OF CARE
27yF p/w SOB x days - reports cough w/ exertion x days, not improved by home albuterol inhaler use.  No fever.  Has not tried her nebulizer bc she was told it contained the same medicine as her inhaler and she should not take both at the same time.
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
## 2023-06-21 NOTE — ED PROVIDER NOTE - OBJECTIVE STATEMENT
27 year old female with pmhx of asthma and palacios presents to ed with sob and cough x 3 days. no fever, chills, calf pain or swelling, abd pain or nausea, vomiting, diarrhea.

## 2023-06-21 NOTE — ED PROVIDER NOTE - PATIENT PORTAL LINK FT
You can access the FollowMyHealth Patient Portal offered by [facility] by registering at the following website: [email or web address]. By joining [facility]’s FollowMyHealth portal, you will also be able to view your health information using other applications (apps) compatible with our system.

## 2023-06-21 NOTE — ED ADULT NURSE NOTE - NSFALLUNIVINTERV_ED_ALL_ED
Assistance OOB with selected safe patient handling equipment if applicable/Bed/Stretcher in lowest position, wheels locked, appropriate side rails in place/Call bell, personal items and telephone in reach/Instruct patient to call for assistance before getting out of bed/chair/stretcher/Non-slip footwear applied when patient is off stretcher/[place] to call system/Physically safe environment - no spills, clutter or unnecessary equipment/Purposeful proactive rounding/Room/bathroom lighting operational, light cord in reach

## 2023-06-21 NOTE — ED ADULT NURSE NOTE - NSICDXPASTMEDICALHX_GEN_ALL_CORE_FT
PAST MEDICAL HISTORY:  Anorexia     Borderline personality disorder     Depression     Hypothyroid     Irritable bowel syndrome (IBS)     Post traumatic stress disorder (PTSD)     POTS (postural orthostatic tachycardia syndrome)

## 2023-07-01 NOTE — SOCIAL HISTORY
Father Zeeshan Alvarado # [phone]   [FreeTextEntry1] : 2 previous engagements, in same-sex relationship since 10/2018. Currently unemployed, Former [employer] ; Studying for bachelors in psychology and minor in women and gender studies, to graduate 2023. Mom in [place], Dad in [place]. Has 7 siblings, raised 5 siblings. Has friends. currently working as pharmacy tech. Domiciled with grandmother in [facility]. \par \par Description: medicinal marijuana for PTSD; 0.5 cartridge/month with daily use

## 2023-07-05 ENCOUNTER — OUTPATIENT (OUTPATIENT)
Dept: OUTPATIENT SERVICES | Facility: HOSPITAL | Age: 28
LOS: 1 days | End: 2023-07-05
Payer: COMMERCIAL

## 2023-07-05 ENCOUNTER — APPOINTMENT (OUTPATIENT)
Dept: PSYCHIATRY | Facility: CLINIC | Age: 28
End: 2023-07-05

## 2023-07-05 ENCOUNTER — APPOINTMENT (OUTPATIENT)
Dept: PSYCHIATRY | Facility: CLINIC | Age: 28
End: 2023-07-05
Payer: COMMERCIAL

## 2023-07-05 DIAGNOSIS — F32.A DEPRESSION, UNSPECIFIED: ICD-10-CM

## 2023-07-05 DIAGNOSIS — F33.1 MAJOR DEPRESSIVE DISORDER, RECURRENT, MODERATE: ICD-10-CM

## 2023-07-05 PROBLEM — E03.9 HYPOTHYROIDISM, UNSPECIFIED: Chronic | Status: ACTIVE | Noted: 2023-06-21

## 2023-07-05 PROBLEM — G90.A POSTURAL ORTHOSTATIC TACHYCARDIA SYNDROME [POTS]: Chronic | Status: ACTIVE | Noted: 2023-06-21

## 2023-07-05 PROCEDURE — ZZZZZ: CPT

## 2023-07-05 PROCEDURE — 99214 OFFICE O/P EST MOD 30 MIN: CPT | Mod: 95

## 2023-07-05 NOTE — PLAN
[Medication education provided] : Medication education provided. [Rationale for medication choices, possible risks/precautions, benefits, alternative treatment choices, and consequences of non-treatment discussed] : Rationale for medication choices, possible risks/precautions, benefits, alternative treatment choices, and consequences of non-treatment discussed with patient/family/caregiver  [FreeTextEntry4] : To keep bpd, depression, ptsd and anxiety under control [FreeTextEntry5] : - continue Effexor XR to 150mg + 37.5mg po daily to target anxiety and depression\par - Continue  Quetiapine extended release 400mg PO QHS\par - trial latuda 20mg po daily\par - Defer Gabapentin 300mg po BID to patient's other doctors to target her pain symptoms\par - Continue Hydroxyzine 25mg -50mg PO BID PRN for anxiety/ irritability \par - Continue with school therapist and support group
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
## 2023-07-05 NOTE — PHYSICAL EXAM
[Cooperative] : cooperative [Clear] : clear [Linear/Goal Directed] : linear/goal directed [None Reported] : none reported [WNL] : within normal limits [Average] : average [FreeTextEntry1] : overweight [FreeTextEntry8] : "okay, better" [de-identified] : sad, constricted, congruent [de-identified] : good [de-identified] : fair

## 2023-07-05 NOTE — REASON FOR VISIT
[Patient preference] : as per patient preference [Telehealth (audio & video) - Individual/Group] : This visit was provided via telehealth using real-time 2-way audio visual technology. [Medical Office: ([facility])___] : The provider was located at the medical office in [unfilled]. [Home] : The patient, [unfilled], was located at home, [unfilled], at the time of the visit. [Patient] : Patient [FreeTextEntry1] : Med management

## 2023-07-05 NOTE — DISCUSSION/SUMMARY
[FreeTextEntry1] : Patient is a 26 y/o F (identifies as "Julio," non-binary, they/them pronouns), recently single, domiciled with grandmother, with PMH of heart murmur with atrial septal defect (benign), PPH of borderline personality disorder, PTSD, obsessive compulsive sx, with 2 prior IPP admissions and 1 prior suicide attempt at age 15 via pill ingestion, current medical marijuana use for PTSD, presenting for follow up examination.\par \par Today, patient reported that they reported some improvement with venlafaxine but requested trial of latuda. After discussing risks and benefits, patient was agreeable to trial with plan to start latuda then cross taper with seroquel. Patient otherwise reports she is okay, denied si./hi, she denied plan or intent, continues to have good insight, is not actively suicidal, homicidal, psychotic or manic during the interview.\par \par Risk assessment: Risk factors include history of suicide attempt, personality disorder diagnosis, PTSD symptoms, depressive symptoms, SIB and recent trigger; protective factors include no acute suicidality, future oriented towards completing coursework, social support from family/friends, engaged and compliant with treatment and help seeking behavior. Patient is therefore low to mod acute suicide risk despite her chronically elevated risk.\par \par plan: \par

## 2023-07-05 NOTE — SOCIAL HISTORY
[FreeTextEntry1] : 2 previous engagements, in same-sex relationship since 10/2018. Currently unemployed, Former [employer] ; Studying for bachelors in psychology and minor in women and gender studies, to graduate 2023. Mom in [place], Dad in [place]. Has 7 siblings, raised 5 siblings. Has friends. currently working as pharmacy tech. Domiciled with grandmother in [facility]. \par \par Description: medicinal marijuana for PTSD; 0.5 cartridge/month with daily use

## 2023-07-06 DIAGNOSIS — F32.A DEPRESSION, UNSPECIFIED: ICD-10-CM

## 2023-07-09 ENCOUNTER — EMERGENCY (EMERGENCY)
Facility: HOSPITAL | Age: 28
LOS: 0 days | Discharge: HOME | End: 2023-07-09
Attending: EMERGENCY MEDICINE
Payer: MEDICAID

## 2023-07-09 VITALS
DIASTOLIC BLOOD PRESSURE: 77 MMHG | SYSTOLIC BLOOD PRESSURE: 110 MMHG | OXYGEN SATURATION: 99 % | TEMPERATURE: 97 F | HEART RATE: 92 BPM | RESPIRATION RATE: 19 BRPM

## 2023-07-09 VITALS
SYSTOLIC BLOOD PRESSURE: 125 MMHG | TEMPERATURE: 96 F | OXYGEN SATURATION: 98 % | RESPIRATION RATE: 19 BRPM | WEIGHT: 164.91 LBS | DIASTOLIC BLOOD PRESSURE: 89 MMHG | HEART RATE: 105 BPM | HEIGHT: 59 IN

## 2023-07-09 DIAGNOSIS — Z91.018 ALLERGY TO OTHER FOODS: ICD-10-CM

## 2023-07-09 DIAGNOSIS — Z88.8 ALLERGY STATUS TO OTHER DRUGS, MEDICAMENTS AND BIOLOGICAL SUBSTANCES: ICD-10-CM

## 2023-07-09 DIAGNOSIS — B34.9 VIRAL INFECTION, UNSPECIFIED: ICD-10-CM

## 2023-07-09 DIAGNOSIS — Z88.0 ALLERGY STATUS TO PENICILLIN: ICD-10-CM

## 2023-07-09 DIAGNOSIS — R42 DIZZINESS AND GIDDINESS: ICD-10-CM

## 2023-07-09 DIAGNOSIS — J45.909 UNSPECIFIED ASTHMA, UNCOMPLICATED: ICD-10-CM

## 2023-07-09 LAB
ALBUMIN SERPL ELPH-MCNC: 4.6 G/DL — SIGNIFICANT CHANGE UP (ref 3.5–5.2)
ALP SERPL-CCNC: 111 U/L — SIGNIFICANT CHANGE UP (ref 30–115)
ALT FLD-CCNC: 14 U/L — SIGNIFICANT CHANGE UP (ref 0–41)
ANION GAP SERPL CALC-SCNC: 10 MMOL/L — SIGNIFICANT CHANGE UP (ref 7–14)
AST SERPL-CCNC: 16 U/L — SIGNIFICANT CHANGE UP (ref 0–41)
BASOPHILS # BLD AUTO: 0.06 K/UL — SIGNIFICANT CHANGE UP (ref 0–0.2)
BASOPHILS NFR BLD AUTO: 0.7 % — SIGNIFICANT CHANGE UP (ref 0–1)
BILIRUB SERPL-MCNC: 0.2 MG/DL — SIGNIFICANT CHANGE UP (ref 0.2–1.2)
BUN SERPL-MCNC: 8 MG/DL — LOW (ref 10–20)
CALCIUM SERPL-MCNC: 9.4 MG/DL — SIGNIFICANT CHANGE UP (ref 8.4–10.5)
CHLORIDE SERPL-SCNC: 102 MMOL/L — SIGNIFICANT CHANGE UP (ref 98–110)
CO2 SERPL-SCNC: 26 MMOL/L — SIGNIFICANT CHANGE UP (ref 17–32)
CREAT SERPL-MCNC: 0.8 MG/DL — SIGNIFICANT CHANGE UP (ref 0.7–1.5)
EGFR: 102 ML/MIN/1.73M2 — SIGNIFICANT CHANGE UP
EOSINOPHIL # BLD AUTO: 0.57 K/UL — SIGNIFICANT CHANGE UP (ref 0–0.7)
EOSINOPHIL NFR BLD AUTO: 6.6 % — SIGNIFICANT CHANGE UP (ref 0–8)
GLUCOSE SERPL-MCNC: 101 MG/DL — HIGH (ref 70–99)
HCG SERPL QL: NEGATIVE — SIGNIFICANT CHANGE UP
HCT VFR BLD CALC: 41.6 % — SIGNIFICANT CHANGE UP (ref 37–47)
HGB BLD-MCNC: 13.9 G/DL — SIGNIFICANT CHANGE UP (ref 12–16)
IMM GRANULOCYTES NFR BLD AUTO: 0.2 % — SIGNIFICANT CHANGE UP (ref 0.1–0.3)
LYMPHOCYTES # BLD AUTO: 1.49 K/UL — SIGNIFICANT CHANGE UP (ref 1.2–3.4)
LYMPHOCYTES # BLD AUTO: 17.3 % — LOW (ref 20.5–51.1)
MAGNESIUM SERPL-MCNC: 1.9 MG/DL — SIGNIFICANT CHANGE UP (ref 1.8–2.4)
MCHC RBC-ENTMCNC: 26.7 PG — LOW (ref 27–31)
MCHC RBC-ENTMCNC: 33.4 G/DL — SIGNIFICANT CHANGE UP (ref 32–37)
MCV RBC AUTO: 80 FL — LOW (ref 81–99)
MONOCYTES # BLD AUTO: 0.58 K/UL — SIGNIFICANT CHANGE UP (ref 0.1–0.6)
MONOCYTES NFR BLD AUTO: 6.7 % — SIGNIFICANT CHANGE UP (ref 1.7–9.3)
NEUTROPHILS # BLD AUTO: 5.88 K/UL — SIGNIFICANT CHANGE UP (ref 1.4–6.5)
NEUTROPHILS NFR BLD AUTO: 68.5 % — SIGNIFICANT CHANGE UP (ref 42.2–75.2)
NRBC # BLD: 0 /100 WBCS — SIGNIFICANT CHANGE UP (ref 0–0)
PLATELET # BLD AUTO: 306 K/UL — SIGNIFICANT CHANGE UP (ref 130–400)
PMV BLD: 9 FL — SIGNIFICANT CHANGE UP (ref 7.4–10.4)
POTASSIUM SERPL-MCNC: 4.5 MMOL/L — SIGNIFICANT CHANGE UP (ref 3.5–5)
POTASSIUM SERPL-SCNC: 4.5 MMOL/L — SIGNIFICANT CHANGE UP (ref 3.5–5)
PROT SERPL-MCNC: 7.6 G/DL — SIGNIFICANT CHANGE UP (ref 6–8)
RBC # BLD: 5.2 M/UL — SIGNIFICANT CHANGE UP (ref 4.2–5.4)
RBC # FLD: 13.1 % — SIGNIFICANT CHANGE UP (ref 11.5–14.5)
SODIUM SERPL-SCNC: 138 MMOL/L — SIGNIFICANT CHANGE UP (ref 135–146)
WBC # BLD: 8.6 K/UL — SIGNIFICANT CHANGE UP (ref 4.8–10.8)
WBC # FLD AUTO: 8.6 K/UL — SIGNIFICANT CHANGE UP (ref 4.8–10.8)

## 2023-07-09 PROCEDURE — 71046 X-RAY EXAM CHEST 2 VIEWS: CPT

## 2023-07-09 PROCEDURE — 71046 X-RAY EXAM CHEST 2 VIEWS: CPT | Mod: 26

## 2023-07-09 PROCEDURE — 85025 COMPLETE CBC W/AUTO DIFF WBC: CPT

## 2023-07-09 PROCEDURE — 99285 EMERGENCY DEPT VISIT HI MDM: CPT | Mod: 25

## 2023-07-09 PROCEDURE — 83735 ASSAY OF MAGNESIUM: CPT

## 2023-07-09 PROCEDURE — 36415 COLL VENOUS BLD VENIPUNCTURE: CPT

## 2023-07-09 PROCEDURE — 93010 ELECTROCARDIOGRAM REPORT: CPT

## 2023-07-09 PROCEDURE — 99284 EMERGENCY DEPT VISIT MOD MDM: CPT

## 2023-07-09 PROCEDURE — 93005 ELECTROCARDIOGRAM TRACING: CPT

## 2023-07-09 PROCEDURE — 80053 COMPREHEN METABOLIC PANEL: CPT

## 2023-07-09 PROCEDURE — 84703 CHORIONIC GONADOTROPIN ASSAY: CPT

## 2023-07-09 RX ORDER — SODIUM CHLORIDE 9 MG/ML
1000 INJECTION INTRAMUSCULAR; INTRAVENOUS; SUBCUTANEOUS ONCE
Refills: 0 | Status: COMPLETED | OUTPATIENT
Start: 2023-07-09 | End: 2023-07-09

## 2023-07-09 RX ORDER — LEVOTHYROXINE SODIUM 125 MCG
1 TABLET ORAL
Refills: 0 | DISCHARGE

## 2023-07-09 RX ADMIN — SODIUM CHLORIDE 2000 MILLILITER(S): 9 INJECTION INTRAMUSCULAR; INTRAVENOUS; SUBCUTANEOUS at 13:11

## 2023-07-09 NOTE — ED PROVIDER NOTE - ATTENDING CONTRIBUTION TO CARE
I have personally performed a history and physical exam on this patient and personally directed the management of the patient. Patient is a 27-year-old female presents for evaluation of dizziness onset over the past 72 hours states she has a history of periods of dizziness in the past worse over the past several weeks as she has diagnosis of POTS patient denies any chest pain shortness of breath palpitations abdominal pain back pain numbness tingling no weakness denies any tenderness denies any headache visual changes states that this is typical for exacerbations of dizziness    On exam normocephalic atraumatic pupils equal round and reactive to light accommodation extraocular muscles intact oropharynx clear chest clear to station bilaterally abdomen soft nontender nondistended bowel sounds positive no guarding or rebound extremities full range of motion patient is able to ambulate well patient given IV fluids we obtained labs which are within normal limits patient is vastly improved essentially asymptomatic in addition we obtained EKG per my dependent evaluation not consistent with STEMI also provide the pain evaluation chest x-ray not consistent with pneumonia or pneumothorax I will discharge at this time given time course patient is out of the window for any emergent neuro intervention also not a candidate for TNK based on time course and improvement in symptoms

## 2023-07-09 NOTE — ED PROVIDER NOTE - NS ED ATTENDING STATEMENT MOD
This was a shared visit with the [facility]. I reviewed and verified the documentation and independently performed the documented: Attending with

## 2023-07-09 NOTE — ED ADULT NURSE NOTE - NSFALLHARMRISKINTERV_ED_ALL_ED

## 2023-07-09 NOTE — ED PROVIDER NOTE - CLINICAL SUMMARY MEDICAL DECISION MAKING FREE TEXT BOX
On exam normocephalic atraumatic pupils equal round and reactive to light accommodation extraocular muscles intact oropharynx clear chest clear to station bilaterally abdomen soft nontender nondistended bowel sounds positive no guarding or rebound extremities full range of motion patient is able to ambulate well patient given IV fluids we obtained labs which are within normal limits patient is vastly improved essentially asymptomatic in addition we obtained EKG per my dependent evaluation not consistent with STEMI also provide the pain evaluation chest x-ray not consistent with pneumonia or pneumothorax I will discharge at this time given time course patient is out of the window for any emergent neuro intervention also not a candidate for TNK based on time course and improvement in symptoms

## 2023-07-09 NOTE — ED PROVIDER NOTE - NSFOLLOWUPINSTRUCTIONS_ED_ALL_ED_FT
Please follow-up with PCP in 1 to 3 days. Return precautions explained in full to patient/family.    Viral Illness, Adult    Viruses are tiny germs that can get into a person's body and cause illness. There are many different types of viruses, and they cause many types of illness. Viral illnesses can range from mild to severe. They can affect various parts of the body.    Short-term conditions that are caused by a virus include colds and the flu (influenza). Long-term conditions that are caused by a virus include herpes, shingles, and HIV (human immunodeficiency virus) infection. A few viruses have been linked to certain cancers.    What are the causes?  Many types of viruses can cause illness. Viruses invade cells in your body, multiply, and cause the infected cells to work abnormally or die. When these cells die, they release more of the virus. When this happens, you develop symptoms of the illness, and the virus continues to spread to other cells. If the virus takes over the function of the cell, it can cause the cell to divide and grow out of control. This happens when a virus causes cancer.    Different viruses get into the body in different ways. You can get a virus by:  - Swallowing food or water that has come in contact with the virus (is contaminated).  - Breathing in droplets that have been coughed or sneezed into the air by an infected person.  - Touching a surface that has been contaminated with the virus and then touching your eyes, nose, or mouth.  - Being bitten by an insect or animal that carries the virus.  - Having sexual contact with a person who is infected with the virus.  - Being exposed to blood or fluids that contain the virus, either through an open cut or during a transfusion.    If a virus enters your body, your body's defense system (immune system) will try to fight the virus. You may be at higher risk for a viral illness if your immune system is weak.    What are the signs or symptoms?  You may have these symptoms, depending on the type of virus and the location of the cells that it invades:  - Cold and flu viruses:  -- Fever.  -- Headache.  -- Sore throat.  -- Muscle aches.  -- Stuffy nose (nasal congestion).  -- Cough.  -- Digestive system (gastrointestinal) viruses:  -- Fever.  -- Pain in the abdomen.  -- Nausea.  -- Diarrhea.  - Liver viruses (hepatitis):  -- Loss of appetite.  -- Tiredness.  -- Skin or the white parts of your eyes turning yellow (jaundice).  - Brain and spinal cord viruses:  -- Fever.  -- Headache.  -- Stiff neck.  -- Nausea and vomiting.  -- Confusion or sleepiness.  - Skin viruses:  -- Warts.  -- Itching.  -- Rash.  - Sexually transmitted viruses:  -- Discharge.  -- Swelling.  -- Redness.  -- Rash.    How is this diagnosed?  This condition may be diagnosed based on one or more of the following:  - Symptoms.  - Medical history.  - Physical exam.  - Blood test, sample of mucus from your lungs (sputum sample), stool sample, or a swab of body fluids or a skin sore (lesion).    How is this treated?  Viruses can be hard to treat because they live within cells. Antibiotic medicines do not treat viruses because these medicines do not get inside cells. Treatment for a viral illness may include:  - Resting and drinking plenty of fluids.  - Medicines to relieve symptoms. These can include over-the-counter medicine for pain and fever, medicines for cough or congestion, and medicines to relieve diarrhea.  - Antiviral medicines. These medicines are available only for certain types of viruses.  Some viral illnesses can be prevented with vaccinations. A common example is the flu shot.    Follow these instructions at home:  Medicines   - Take over-the-counter and prescription medicines only as told by your health care provider.  - If you were prescribed an antiviral medicine, take it as told by your health care provider. Do not stop taking the antiviral even if you start to feel better.  - Be aware of when antibiotics are needed and when they are not needed. Antibiotics do not treat viruses. You may get an antibiotic if your health care provider thinks that you may have, or are at risk for, a bacterial infection and you have a viral infection.  - Do not ask for an antibiotic prescription if you have been diagnosed with a viral illness. Antibiotics will not make your illness go away faster.  - Frequently taking antibiotics when they are not needed can lead to antibiotic resistance. When this develops, the medicine no longer works against the bacteria that it normally fights.    General instructions   - Drink enough fluids to keep your urine pale yellow.  - Rest as much as possible.  - Return to your normal activities as told by your health care provider. Ask your health care provider what activities are safe for you.  - Keep all follow-up visits as told by your health care provider. This is important.    How is this prevented?  To reduce your risk of viral illness:  - Wash your hands often with soap and water for at least 20 seconds. If soap and water are not available, use hand .  - Avoid touching your nose, eyes, and mouth, especially if you have not washed your hands recently.  - If anyone in your household has a viral infection, clean all household surfaces that may have been in contact with the virus. Use soap and hot water. You may also use bleach that you have added water to (diluted).  - Stay away from people who are sick with symptoms of a viral infection.  - Do not share items such as toothbrushes and water bottles with other people.  - Keep your vaccinations up to date. This includes getting a yearly flu shot.  - Eat a healthy diet and get plenty of rest.    Contact a health care provider if:  - You have symptoms of a viral illness that do not go away.  - Your symptoms come back after going away.  - Your symptoms get worse.    Get help right away if you have:  - Trouble breathing.  - A severe headache or a stiff neck.  - Severe vomiting or pain in your abdomen.  These symptoms may represent a serious problem that is an emergency. Do not wait to see if the symptoms will go away. Get medical help right away. Call your local emergency services (911 in the [place] Do not drive yourself to the hospital.     Summary  - Viruses are types of germs that can get into a person's body and cause illness. Viral illnesses can range from mild to severe. They can affect various parts of the body.  - Viruses can be hard to treat. There are medicines to relieve symptoms, and there are some antiviral medicines.  - If you were prescribed an antiviral medicine, take it as told by your health care provider. Do not stop taking the antiviral even if you start to feel better.  - Contact a health care provider if you have symptoms of a viral illness that do not go away.    This information is not intended to replace advice given to you by your health care provider. Make sure you discuss any questions you have with your health care provider.  Document Revised: 05/03/2021 Document Reviewed: 10/27/2020  Elsevier Patient Education © 2022 [employer]

## 2023-07-09 NOTE — ED ADULT NURSE NOTE - NS ED NOTE  TALK SOMEONE YN
Subjective:    Patient ID:  Lillian Stokes is a 81 y.o. female who presents for follow-up of CHF    HPI  She comes with chest pain, not relieved by SL NTG  She also describes shortness of breath and leg swelling over the weekend  Finally, she agrees with cardiomems    Review of Systems   Constitution: Negative for decreased appetite, weakness, malaise/fatigue, weight gain and weight loss.   Cardiovascular: Negative for chest pain, dyspnea on exertion, leg swelling, palpitations and syncope.   Respiratory: Negative for cough and shortness of breath.    Gastrointestinal: Negative.    All other systems reviewed and are negative.       Objective:    Physical Exam   Constitutional: She is oriented to person, place, and time. She appears well-developed and well-nourished.   HENT:   Head: Normocephalic.   Eyes: Pupils are equal, round, and reactive to light.   Neck: Normal range of motion. Neck supple. No JVD present. Carotid bruit is not present. No thyromegaly present.   Cardiovascular: Normal rate, regular rhythm, normal heart sounds, intact distal pulses and normal pulses.  PMI is not displaced.  Exam reveals no gallop.    No murmur heard.  Pulmonary/Chest: Effort normal and breath sounds normal.   Abdominal: Soft. Normal appearance. She exhibits no mass. There is no hepatosplenomegaly. There is no tenderness.   Musculoskeletal: Normal range of motion. She exhibits no edema.   Neurological: She is alert and oriented to person, place, and time. She has normal strength and normal reflexes. No sensory deficit.   Skin: Skin is warm and intact.   Psychiatric: She has a normal mood and affect.   Nursing note and vitals reviewed.    Functional Class III      Assessment:       1. Acute on chronic combined systolic and diastolic heart failure    2. Atherosclerosis of native coronary artery without angina pectoris, unspecified whether native or transplanted heart    3. Coronary artery disease involving native coronary  artery of native heart without angina pectoris    4. Cardiomyopathy, ischemic    5. Essential hypertension    6. PAF (paroxysmal atrial fibrillation)    7. CKD (chronic kidney disease) stage 4, GFR 15-29 ml/min         Plan:     Extra bumex as needed  Cardiomems next week         No

## 2023-07-09 NOTE — ED PROVIDER NOTE - OBJECTIVE STATEMENT
27-year-old past medical history of asthma, POTS, BPD coming in with complaints of dizziness.  Patient reports the past day they have been dizzy, worsened when they gets up, associated with mild SOB.  Does note that they have had excessive sweating for no apparent reason. Denies any fever, chills, nausea, vomiting, CP, changes in urination, or changes in bowel movements.

## 2023-07-09 NOTE — ED PROVIDER NOTE - PHYSICAL EXAMINATION
Gen: Alert, NAD, well appearing  Head: NC, AT, EOMI, normal lids/conjunctiva,  PERRL,  ENT: normal hearing, patent oropharynx without erythema/exudate, uvula midline  Pulm: Bilateral BS, normal resp effort, no wheeze/stridor/retractions  CV: RRR, no M/R/G, +dist pulses  Abd: soft, NT/ND, no hepatosplenomegaly  Mskel: no edema/erythema/cyanosis  Skin: no rash, warm/dry  Neuro: AAOx3, no sensory/motor deficits, CN 2-12 grossly intact

## 2023-07-09 NOTE — ED PROVIDER NOTE - PATIENT PORTAL LINK FT
You can access the FollowMyHealth Patient Portal offered by [facility] by registering at the following website: [email or web address]. By joining [facility]’s FollowMyHealth portal, you will also be able to view your health information using other applications (apps) compatible with our system.

## 2023-07-09 NOTE — ED ADULT TRIAGE NOTE - CHIEF COMPLAINT QUOTE
Pt states " I have a history of postural tachycardia. When I stand up I feel dizzy, This has been going on over two days"

## 2023-07-10 NOTE — ED ADULT NURSE NOTE - NS ED NOTE  TALK SOMEONE YN
No Detail Level: Detailed Size Of Lesion In Cm (Optional): 0.3 X Size Of Lesion In Cm (Optional): 0.2

## 2023-07-13 ENCOUNTER — OUTPATIENT (OUTPATIENT)
Dept: OUTPATIENT SERVICES | Facility: HOSPITAL | Age: 28
LOS: 1 days | End: 2023-07-13
Payer: COMMERCIAL

## 2023-07-13 ENCOUNTER — APPOINTMENT (OUTPATIENT)
Dept: PSYCHIATRY | Facility: CLINIC | Age: 28
End: 2023-07-13
Payer: COMMERCIAL

## 2023-07-13 DIAGNOSIS — F33.1 MAJOR DEPRESSIVE DISORDER, RECURRENT, MODERATE: ICD-10-CM

## 2023-07-13 PROCEDURE — 99213 OFFICE O/P EST LOW 20 MIN: CPT | Mod: 95

## 2023-07-13 PROCEDURE — ZZZZZ: CPT

## 2023-07-13 NOTE — DISCUSSION/SUMMARY
[FreeTextEntry1] : Patient is a 28 y/o F (identifies as "Julio," non-binary, they/them pronouns), recently single, domiciled with grandmother, with PMH of heart murmur with atrial septal defect (benign), PPH of borderline personality disorder, PTSD, obsessive compulsive sx, with 2 prior IPP admissions and 1 prior suicide attempt at age 15 via pill ingestion, current medical marijuana use for PTSD, presenting for follow up examination.\par \par Today, patient reported good response to latuda and report improvement in mood but report some worsening of anxiety with fair response to hydroxyzine. As patient is going on vacation and patient's anxiety responded well to hydroxyzine, patient was agreeable to continue on current medication regiment and follow up in 2 weeks for continued observation. Patient otherwise reports she is okay, denied si./hi, she denied plan or intent, continues to have good insight, is not actively suicidal, homicidal, psychotic or manic during the interview.\par \par Risk assessment: Risk factors include history of suicide attempt, personality disorder diagnosis, PTSD symptoms, depressive symptoms, SIB and recent trigger; protective factors include no acute suicidality, future oriented towards completing coursework, social support from family/friends, engaged and compliant with treatment and help seeking behavior. Patient is therefore low to mod acute suicide risk despite her chronically elevated risk.\par

## 2023-07-13 NOTE — PLAN
[Medication education provided] : Medication education provided. [Rationale for medication choices, possible risks/precautions, benefits, alternative treatment choices, and consequences of non-treatment discussed] : Rationale for medication choices, possible risks/precautions, benefits, alternative treatment choices, and consequences of non-treatment discussed with patient/family/caregiver  [FreeTextEntry4] : To keep bpd, depression, ptsd and anxiety under control [FreeTextEntry5] : - continue Effexor XR to 150mg + 37.5mg po daily to target anxiety and depression\par - Continue  Quetiapine extended release 400mg PO QHS\par - continue latuda 20mg po daily\par - patient requested refill of Gabapentin 300mg po BID prn for anxiety symptoms\par - Continue Hydroxyzine 25mg -50mg PO BID PRN for anxiety/ irritability \par - Continue with school therapist and support group

## 2023-07-13 NOTE — PHYSICAL EXAM
[Cooperative] : cooperative [Clear] : clear [Linear/Goal Directed] : linear/goal directed [None Reported] : none reported [WNL] : within normal limits [Average] : average [FreeTextEntry1] : overweight [FreeTextEntry8] : "better" [de-identified] : euthymic, less constricted than previous [de-identified] : good [de-identified] : fair
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
## 2023-07-13 NOTE — REASON FOR VISIT
[Patient preference] : as per patient preference [Telehealth (audio & video) - Individual/Group] : This visit was provided via telehealth using real-time 2-way audio visual technology. [Medical Office: ([facility])___] : The provider was located at the medical office in [unfilled]. [Home] : The patient, [unfilled], was located at home, [unfilled], at the time of the visit. [Patient] : Patient [FreeTextEntry1] : Med management
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
## 2023-07-14 DIAGNOSIS — F33.1 MAJOR DEPRESSIVE DISORDER, RECURRENT, MODERATE: ICD-10-CM

## 2023-07-22 ENCOUNTER — EMERGENCY (EMERGENCY)
Facility: HOSPITAL | Age: 28
LOS: 0 days | Discharge: HOME | End: 2023-07-22
Attending: EMERGENCY MEDICINE
Payer: MEDICAID

## 2023-07-22 VITALS
RESPIRATION RATE: 18 BRPM | WEIGHT: 169.98 LBS | TEMPERATURE: 97 F | DIASTOLIC BLOOD PRESSURE: 89 MMHG | OXYGEN SATURATION: 100 % | SYSTOLIC BLOOD PRESSURE: 128 MMHG | HEIGHT: 59 IN | HEART RATE: 86 BPM

## 2023-07-22 DIAGNOSIS — Z91.018 ALLERGY TO OTHER FOODS: ICD-10-CM

## 2023-07-22 DIAGNOSIS — E03.9 HYPOTHYROIDISM, UNSPECIFIED: ICD-10-CM

## 2023-07-22 DIAGNOSIS — Z87.19 PERSONAL HISTORY OF OTHER DISEASES OF THE DIGESTIVE SYSTEM: ICD-10-CM

## 2023-07-22 DIAGNOSIS — F41.9 ANXIETY DISORDER, UNSPECIFIED: ICD-10-CM

## 2023-07-22 DIAGNOSIS — Z86.59 PERSONAL HISTORY OF OTHER MENTAL AND BEHAVIORAL DISORDERS: ICD-10-CM

## 2023-07-22 DIAGNOSIS — Z88.0 ALLERGY STATUS TO PENICILLIN: ICD-10-CM

## 2023-07-22 DIAGNOSIS — G47.00 INSOMNIA, UNSPECIFIED: ICD-10-CM

## 2023-07-22 DIAGNOSIS — F32.A DEPRESSION, UNSPECIFIED: ICD-10-CM

## 2023-07-22 DIAGNOSIS — Z88.1 ALLERGY STATUS TO OTHER ANTIBIOTIC AGENTS STATUS: ICD-10-CM

## 2023-07-22 DIAGNOSIS — Z88.8 ALLERGY STATUS TO OTHER DRUGS, MEDICAMENTS AND BIOLOGICAL SUBSTANCES: ICD-10-CM

## 2023-07-22 DIAGNOSIS — F60.3 BORDERLINE PERSONALITY DISORDER: ICD-10-CM

## 2023-07-22 PROCEDURE — 99282 EMERGENCY DEPT VISIT SF MDM: CPT

## 2023-07-22 PROCEDURE — 99283 EMERGENCY DEPT VISIT LOW MDM: CPT

## 2023-07-22 NOTE — ED PROVIDER NOTE - OBJECTIVE STATEMENT
this is a 28 yo female presents to ed for evaluation of not being able to sleep. Patient states she is under the care of a therapist.  Patient states most recently she was started on Latuda.  Patient was told to still continue her Seroquel and they would see how she is doing on the Latuda.  Patient states since starting the Latuda her anxiety has been worse.  Patient states she is not able to sleep for the past 3 nights.  Patient has been on Latuda for 3 weeks

## 2023-07-22 NOTE — ED PROVIDER NOTE - NS ED MD DISPO DISCHARGE CCDA
Date: 5/8/19        Phone #: [phone]  Dietitian Name: Radha Peterson RD, CD    Weight: 283.9 lbs   BMI: 45.8  Total Weight Loss: 45.7 (lb)  % Excess Body Weight Loss 26.2%  Wt at surgery:  337.2 lbs      Bariatric Nutrition and Activity Plan (after surgery)    1.Eat 3 small meals per day  2. Drink 48-64 ounces of liquids per day   3. No carbonated beverages  4. Choose foods low in sugar and fat  5. Take vitamins as directed  6. Aim for 60-80 grams of protein per day  7. Eat meals very slowly   8. Be physically active    Personal Goals:  Recommended modifications:   1. Continue to eat 3 balanced meals with high protein snacks as needed.   -Choose protein rich food source with each meal.     -Choose a variety of fruits and vegetables.    2. Watch your portions sizes not to exceed 12 ounces (1 1/2 cup per meal)    3. Aim for at least 9973-3433 calories and 60-80 grams protein.    -Begin tracking your food intake again so we can review it at your next visit.    4. Drink 48-64 oz fluids / day  5. Aim for 30 minutes exercise, 3-4 times per week.  6. GOAL: Follow up with a therapist on managing stress  7. Only follow the High Protein liquid diet for 1 week.    8. Locate a bariatric dietitian in [place] to help support you.          
Patient/Caregiver provided printed discharge information.

## 2023-07-22 NOTE — ED ADULT NURSE NOTE - CAS TRG GEN SKIN COLOR
June 26, 2017    Angelica Gracia  [address] [facility] [address]    Dear Angelica,  We are happy to inform you that your PAP smear result from 6/12/17 is normal.  We are now able to do a follow up test on PAP smears. The DNA test is for HPV (Human Papilloma Virus). Cervical cancer is closely linked with certain types of HPV. Your result showed no evidence of high risk HPV.  Therefore we recommend you return in 3 years for your next pap smear.  You will still need to return to the clinic every year for an annual exam and other preventive tests.  Please contact the clinic at [phone] with any questions.  Sincerely,    Anita ROEL Redmond CNP/rlm   Normal for race

## 2023-07-22 NOTE — ED ADULT TRIAGE NOTE - CHIEF COMPLAINT QUOTE
As per patient "I am having trouble sleeping for three days and I'm on Latuda for a month for anxiety, but I can't sleep". Denies wanting to hurt self, denies wanting to hurt others.

## 2023-07-22 NOTE — ED PROVIDER NOTE - PATIENT PORTAL LINK FT
You can access the FollowMyHealth Patient Portal offered by [facility] by registering at the following website: [email or web address]. By joining [facility]’s FollowMyHealth portal, you will also be able to view your health information using other applications (apps) compatible with our system.

## 2023-07-22 NOTE — ED PROVIDER NOTE - PHYSICAL EXAMINATION
--EXAM--  VITAL SIGNS: I have reviewed vs documented at present.  CONSTITUTIONAL: Well-developed; well-nourished; in no acute distress.     PSYCH: Cooperative, appropriate.

## 2023-07-22 NOTE — ED ADULT NURSE NOTE - NSFALLUNIVINTERV_ED_ALL_ED
Bed/Stretcher in lowest position, wheels locked, appropriate side rails in place/Call bell, personal items and telephone in reach/Instruct patient to call for assistance before getting out of bed/chair/stretcher/Non-slip footwear applied when patient is off stretcher/[place] to call system/Physically safe environment - no spills, clutter or unnecessary equipment/Purposeful proactive rounding/Room/bathroom lighting operational, light cord in reach

## 2023-07-22 NOTE — ED PROVIDER NOTE - ATTENDING APP SHARED VISIT CONTRIBUTION OF CARE
Patient with above history complains of insomnia which she attributes to having begun Latuda for anxiety 3 weeks ago, is on both Seroquel and gabapentin and takes hydroxyzine as needed for sleep which she has been taking without result, no SI/HI/hallucinations, on exam well-appearing patient affect full thoughts linear pleasant conversant in ED, neurologically intact, long discussion with patient regarding ED prescribing practice, does have an appointment with her prescribing physician Tuesday, recommend holding this evening and Monday evenings dose of Latuda and following up as scheduled to discuss discontinuing this medication.  Patient counseled regarding conditions which should prompt return.

## 2023-07-22 NOTE — ED PROVIDER NOTE - NSFOLLOWUPINSTRUCTIONS_ED_ALL_ED_FT
Follow-up with your psychiatrist this week.    Return to the ED for any new or worsening symptoms.    Hold the Latuda for today.  Try taking medicine every other day until you see your psychiatrist.

## 2023-07-22 NOTE — ED PROVIDER NOTE - PROGRESS NOTE DETAILS
Patient was advised to follow-up with her therapist this week.  Patient was told to hold the Latuda for today.  Patient will take Latuda every other day to see if that relieves her symptoms

## 2023-07-24 ENCOUNTER — APPOINTMENT (OUTPATIENT)
Dept: PSYCHIATRY | Facility: CLINIC | Age: 28
End: 2023-07-24
Payer: COMMERCIAL

## 2023-07-24 ENCOUNTER — OUTPATIENT (OUTPATIENT)
Dept: OUTPATIENT SERVICES | Facility: HOSPITAL | Age: 28
LOS: 1 days | End: 2023-07-24
Payer: COMMERCIAL

## 2023-07-24 DIAGNOSIS — F32.A DEPRESSION, UNSPECIFIED: ICD-10-CM

## 2023-07-24 DIAGNOSIS — F60.3 BORDERLINE PERSONALITY DISORDER: ICD-10-CM

## 2023-07-24 DIAGNOSIS — F33.1 MAJOR DEPRESSIVE DISORDER, RECURRENT, MODERATE: ICD-10-CM

## 2023-07-24 PROCEDURE — ZZZZZ: CPT

## 2023-07-24 PROCEDURE — 99214 OFFICE O/P EST MOD 30 MIN: CPT | Mod: 95
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
## 2023-07-24 NOTE — PLAN
[Medication education provided] : Medication education provided. [Rationale for medication choices, possible risks/precautions, benefits, alternative treatment choices, and consequences of non-treatment discussed] : Rationale for medication choices, possible risks/precautions, benefits, alternative treatment choices, and consequences of non-treatment discussed with patient/family/caregiver  [FreeTextEntry4] : To keep bpd, depression, ptsd and anxiety under control [FreeTextEntry5] : - continue Effexor XR to 150mg + 37.5mg po daily to target anxiety and depression\par - Continue  Quetiapine extended release 400mg PO QHS\par - patient stopped latuda\par - continune Gabapentin 300mg po BID prn for anxiety symptoms, patient reported anxiety has been under fair control \par - Continue Hydroxyzine 25mg -50mg PO BID PRN for anxiety/ irritability \par - refer to in house therapist

## 2023-07-24 NOTE — REASON FOR VISIT
[Patient preference] : as per patient preference [Telehealth (audio & video) - Individual/Group] : This visit was provided via telehealth using real-time 2-way audio visual technology. [Medical Office: ([facility])___] : The provider was located at the medical office in [unfilled]. [Home] : The patient, [unfilled], was located at home, [unfilled], at the time of the visit. [Patient] : Patient [FreeTextEntry1] : Med management

## 2023-07-24 NOTE — PHYSICAL EXAM
[Cooperative] : cooperative [Clear] : clear [Linear/Goal Directed] : linear/goal directed [None Reported] : none reported [WNL] : within normal limits [Average] : average [FreeTextEntry1] : overweight [FreeTextEntry8] : "okay" [de-identified] : low, mildly constricted, near tearful at times.  [de-identified] : good [de-identified] : fair

## 2023-07-24 NOTE — DISCUSSION/SUMMARY
[FreeTextEntry1] : Patient is a 26 y/o F (identifies as "Julio," non-binary, they/them pronouns), recently single, domiciled with grandmother, with PMH of heart murmur with atrial septal defect (benign), PPH of borderline personality disorder, PTSD, obsessive compulsive sx, with 2 prior IPP admissions and 1 prior suicide attempt at age 15 via pill ingestion, current medical marijuana use for PTSD, presenting for follow up examination.\par \par Today, patient reported that her response to lurasidone was not sustained, reporting that she ended up developing poor sleep as well as self harm ideations which caused her to terminate latuda. She reports she is interested in ECT but was agreeable to continue current med regimant while she goes on vacation. She was agreeable to therapy referal in clinic. Patient otherwise reports she is okay, denied si./hi, she denied plan or intent, continues to have good insight, is not actively suicidal, homicidal, psychotic or manic during the interview.\par \par Risk assessment: Risk factors include history of suicide attempt, personality disorder diagnosis, PTSD symptoms, depressive symptoms, SIB and recent trigger; protective factors include no acute suicidality, future oriented towards completing coursework, social support from family/friends, engaged and compliant with treatment and help seeking behavior. Patient is therefore low to mod acute suicide risk despite her chronically elevated risk.\par
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
## 2023-07-25 DIAGNOSIS — F32.A DEPRESSION, UNSPECIFIED: ICD-10-CM

## 2023-07-25 DIAGNOSIS — F60.3 BORDERLINE PERSONALITY DISORDER: ICD-10-CM

## 2023-08-26 ENCOUNTER — NON-APPOINTMENT (OUTPATIENT)
Age: 28
End: 2023-08-26

## 2023-08-29 ENCOUNTER — EMERGENCY (EMERGENCY)
Facility: HOSPITAL | Age: 28
LOS: 0 days | Discharge: HOME | End: 2023-08-29
Attending: EMERGENCY MEDICINE
Payer: MEDICAID

## 2023-08-29 VITALS
OXYGEN SATURATION: 99 % | SYSTOLIC BLOOD PRESSURE: 142 MMHG | RESPIRATION RATE: 18 BRPM | DIASTOLIC BLOOD PRESSURE: 80 MMHG | HEART RATE: 84 BPM | TEMPERATURE: 98 F

## 2023-08-29 VITALS — WEIGHT: 169.98 LBS | HEIGHT: 59 IN

## 2023-08-29 DIAGNOSIS — R20.0 ANESTHESIA OF SKIN: ICD-10-CM

## 2023-08-29 DIAGNOSIS — E03.9 HYPOTHYROIDISM, UNSPECIFIED: ICD-10-CM

## 2023-08-29 DIAGNOSIS — M54.50 LOW BACK PAIN, UNSPECIFIED: ICD-10-CM

## 2023-08-29 DIAGNOSIS — Z91.018 ALLERGY TO OTHER FOODS: ICD-10-CM

## 2023-08-29 DIAGNOSIS — G89.29 OTHER CHRONIC PAIN: ICD-10-CM

## 2023-08-29 DIAGNOSIS — K58.9 IRRITABLE BOWEL SYNDROME WITHOUT DIARRHEA: ICD-10-CM

## 2023-08-29 DIAGNOSIS — F60.3 BORDERLINE PERSONALITY DISORDER: ICD-10-CM

## 2023-08-29 DIAGNOSIS — G90.A POSTURAL ORTHOSTATIC TACHYCARDIA SYNDROME [POTS]: ICD-10-CM

## 2023-08-29 DIAGNOSIS — G60.8 OTHER HEREDITARY AND IDIOPATHIC NEUROPATHIES: ICD-10-CM

## 2023-08-29 DIAGNOSIS — Z88.1 ALLERGY STATUS TO OTHER ANTIBIOTIC AGENTS STATUS: ICD-10-CM

## 2023-08-29 DIAGNOSIS — Z88.0 ALLERGY STATUS TO PENICILLIN: ICD-10-CM

## 2023-08-29 DIAGNOSIS — Z88.8 ALLERGY STATUS TO OTHER DRUGS, MEDICAMENTS AND BIOLOGICAL SUBSTANCES: ICD-10-CM

## 2023-08-29 DIAGNOSIS — F32.A DEPRESSION, UNSPECIFIED: ICD-10-CM

## 2023-08-29 PROCEDURE — 99283 EMERGENCY DEPT VISIT LOW MDM: CPT

## 2023-08-29 PROCEDURE — 99284 EMERGENCY DEPT VISIT MOD MDM: CPT

## 2023-08-29 RX ORDER — DEXAMETHASONE 0.5 MG/5ML
2 ELIXIR ORAL
Qty: 6 | Refills: 0
Start: 2023-08-29 | End: 2023-08-31

## 2023-08-29 RX ORDER — DEXAMETHASONE 0.5 MG/5ML
10 ELIXIR ORAL ONCE
Refills: 0 | Status: COMPLETED | OUTPATIENT
Start: 2023-08-29 | End: 2023-08-29

## 2023-08-29 RX ORDER — THIAMINE MONONITRATE (VIT B1) 100 MG
1 TABLET ORAL
Qty: 30 | Refills: 0
Start: 2023-08-29 | End: 2023-09-27

## 2023-08-29 RX ADMIN — Medication 10 MILLIGRAM(S): at 12:58

## 2023-08-29 NOTE — ED PROVIDER NOTE - PHYSICAL EXAMINATION
CONSTITUTIONAL: Well-developed; well-nourished; in no acute distress.   SKIN: Warm, dry  HEAD: Normocephalic; atraumatic  EYES: PERRL, EOMI, normal sclera and conjunctiva   ENT: No nasal discharge; airway clear.  NECK: Supple; non tender.  CARD:  Regular rate and rhythm. Normal S1, S2. 2+ PT, DP pulses.  RESP: No increased WOB. CTA b/l without wheezes, crackles, rhonchi  ABD: Normoactive BS. Soft, nontender, nondistended.  EXT: Normal ROM. No LE edema. No deformities.   NEURO: Alert, oriented, grossly unremarkable. Normal gait with walker. 5/5 strength LE joints.   PSYCH: Cooperative, appropriate.

## 2023-08-29 NOTE — ED ADULT NURSE NOTE - NSFALLUNIVINTERV_ED_ALL_ED
Bed/Stretcher in lowest position, wheels locked, appropriate side rails in place/Call bell, personal items and telephone in reach/Instruct patient to call for assistance before getting out of bed/chair/stretcher/Non-slip footwear applied when patient is off stretcher/[place] to call system/Physically safe environment - no spills, clutter or unnecessary equipment/Purposeful proactive rounding/Room/bathroom lighting operational, light cord in reach

## 2023-08-29 NOTE — ED PROVIDER NOTE - PATIENT PORTAL LINK FT
You can access the FollowMyHealth Patient Portal offered by [facility] by registering at the following website: [email or web address]. By joining [facility]’s FollowMyHealth portal, you will also be able to view your health information using other applications (apps) compatible with our system.

## 2023-08-29 NOTE — ED PROVIDER NOTE - DIFFERENTIAL DIAGNOSIS
Differential Diagnosis The differential diagnosis for patients clinical presentation includes but is not limited to:  Lumbar radiculopathy, sciatica, electrolyte abnormality, vitamin deficiency, neuropathy

## 2023-08-29 NOTE — ED PROVIDER NOTE - ATTENDING CONTRIBUTION TO CARE
I personally evaluated patient. I agree with the findings and plan with all documentation on chart except as documented  in my note.    27-year-old female past medical history of POTS, hypothyroidism, PTSD, hypothyroidism, anorexia, depression, borderline personality disorder, chronic lower back pain who presents to the emergency department with lower back pain shooting down to her bilateral legs.  No falls or recent trauma and patient denies any fever, chills, recent illness.  Patient is still ambulatory at baseline and reports that with certain positions she feels numbness that shoots to her legs.  Patient also has numbness to her fingertips.  Patient had recent blood work done and her electrolytes were normal.  Patient MRI 2 years ago reports she likely has disc disease.  Patient multiple ED visits this year and had CT scans of her abdomen pelvis.  Lumbar anatomy reviewed and patient has unremarkable lumbar vertebrae.  Patient is neurovascularly intact and is strong pulses to bilateral extremities and 5 out of 5 motor strength.  Vital signs reviewed.  I printed prior labs and CT imaging for patient and we discussed them.  I believe patient requires follow-up for an MRI of her lower spine to evaluate for spinal stenosis or lumbar disc herniation.  Patient has limited diet and I also discussed starting a multivitamin and taking B12 and thiamine for possible neuropathy.  Patient to follow-up closely with her primary doctor we will put in for rapid follow-up for neurosurgery/spine evaluation.    I discussed with patient need for possible MRI for further work up for their symptoms and how this was not possible in the Emergency Department at this time.  Follow up being provided to have further evaluation for this test given.    Full DC instructions discussed and patient knows when to seek immediate medical attention.  Patient has proper follow up.  All results discussed and patient aware they may require further work up.  Proper follow up ensured. Limitations of ED work up discussed.  Medications administered and prescribed/OTC home meds discussed.  All questions and concerns from patient or family addressed. Understanding of instructions verbalized.

## 2023-08-29 NOTE — ED PROVIDER NOTE - CLINICAL SUMMARY MEDICAL DECISION MAKING FREE TEXT BOX
27-year-old female past medical history of POTS, hypothyroidism, PTSD, hypothyroidism, anorexia, depression, borderline personality disorder, chronic lower back pain who presents to the emergency department with lower back pain shooting down to her bilateral legs.  No falls or recent trauma and patient denies any fever, chills, recent illness.  Patient is still ambulatory at baseline and reports that with certain positions she feels numbness that shoots to her legs.  Patient also has numbness to her fingertips.  Patient had recent blood work done and her electrolytes were normal.  Patient MRI 2 years ago reports she likely has disc disease.  Patient multiple ED visits this year and had CT scans of her abdomen pelvis.  Lumbar anatomy reviewed and patient has unremarkable lumbar vertebrae.  Patient is neurovascularly intact and is strong pulses to bilateral extremities and 5 out of 5 motor strength.  Vital signs reviewed.  I printed prior labs and CT imaging for patient and we discussed them.  I believe patient requires follow-up for an MRI of her lower spine to evaluate for spinal stenosis or lumbar disc herniation.  Patient has limited diet and I also discussed starting a multivitamin and taking B12 and thiamine for possible neuropathy.  Patient to follow-up closely with her primary doctor we will put in for rapid follow-up for neurosurgery/spine evaluation.    I discussed with patient need for possible MRI for further work up for their symptoms and how this was not possible in the Emergency Department at this time.  Follow up being provided to have further evaluation for this test given.    Full DC instructions discussed and patient knows when to seek immediate medical attention.  Patient has proper follow up.  All results discussed and patient aware they may require further work up.  Proper follow up ensured. Limitations of ED work up discussed.  Medications administered and prescribed/OTC home meds discussed.  All questions and concerns from patient or family addressed. Understanding of instructions verbalized.

## 2023-08-29 NOTE — ED ADULT TRIAGE NOTE - CHIEF COMPLAINT QUOTE
"I have a history of chronic back pain. Over the last few days the pain has gotten worse. Now I feel numbness in my legs and feet."

## 2023-08-29 NOTE — ED PROVIDER NOTE - OBJECTIVE STATEMENT
28 y/o F with PMH chronic back pain, IBS, POTS, depression presenting for leg numbness for 1 week. Pt reports she has worsening pain in her back form her baseline chronic pain and when she sits for more than a few minutes she has numbness in both legs. Denies incontinence, saddle anesthesia, recent fever, recent trauma, leg pain, leg swelling, chest pain, palpitations

## 2023-08-29 NOTE — ED PROVIDER NOTE - NSFOLLOWUPINSTRUCTIONS_ED_ALL_ED_FT
Back Pain    Please take medications as prescribed and follow up with the Spine doctors. As discussed, you may require an MRI of your back. Two vitamins/minerals that we recommend are Vitamin B12 and Thiamine. Expect a call from our referral coordinators.    Our Emergency Department Referral Coordinators will be reaching out to you in the next 24-48 hours from 9:00am to 5:00pm with a follow up appointment. Please expect a phone call from the hospital in that time frame. If you do not receive a call or if you have any questions or concerns, you can reach them at   [phone]      Back pain is very common in adults. The cause of back pain is rarely dangerous and the pain often gets better over time. The cause of your back pain may not be known and may include strain of muscles or ligaments, degeneration of the spinal disks, or arthritis. Occasionally the pain may radiate down your leg(s). Over-the-counter medicines to reduce pain and inflammation are often the most helpful. Stretching and remaining active frequently helps the healing process.     SEEK IMMEDIATE MEDICAL CARE IF YOU HAVE ANY OF THE FOLLOWING SYMPTOMS: bowel or bladder control problems, unusual weakness or numbness in your arms or legs, nausea or vomiting, abdominal pain, fever, dizziness/lightheadedness.

## 2023-08-31 ENCOUNTER — APPOINTMENT (OUTPATIENT)
Dept: PSYCHIATRY | Facility: CLINIC | Age: 28
End: 2023-08-31

## 2023-09-05 NOTE — ED ADULT NURSE NOTE - HOW OFTEN DO YOU HAVE A DRINK CONTAINING ALCOHOL?
Repair Performed By Another Provider Text (Leave Blank If You Do Not Want): After the tissue was excised the defect was repaired by another provider. Never

## 2023-09-06 ENCOUNTER — APPOINTMENT (OUTPATIENT)
Dept: PAIN MANAGEMENT | Facility: CLINIC | Age: 28
End: 2023-09-06
Payer: MEDICAID

## 2023-09-06 VITALS
SYSTOLIC BLOOD PRESSURE: 107 MMHG | DIASTOLIC BLOOD PRESSURE: 75 MMHG | HEIGHT: 59 IN | BODY MASS INDEX: 34.27 KG/M2 | HEART RATE: 101 BPM | WEIGHT: 170 LBS

## 2023-09-06 PROCEDURE — 99214 OFFICE O/P EST MOD 30 MIN: CPT | Mod: 25

## 2023-09-06 PROCEDURE — 96136 PSYCL/NRPSYC TST PHY/QHP 1ST: CPT | Mod: 59

## 2023-09-06 NOTE — ASSESSMENT
[FreeTextEntry1] : 28 year old female presenting with a severe flare up of cervical and lumbar radiculopathy. Patient is presenting with radicular pain with impairment in ADLs and functionality.  The pain has not responded to conservative care, including medications, stretching, as well as active modalities, such as physical therapy.  Imaging studies as well as physical exam findings corroborate the symptomatology and radicular pain.  We will proceed with an epidural at this point. Follow up in 6 weeks will be made for reassessment. I have explained the findings to the patient and all questions have been answered.   Patient had a MRI that shows a radicular component along with pain referred into the upper extremity. Patient has trialed rehab (Home exercise, physical therapy or chiropractic care) and medications. I will schedule a C7-T1 cervical epidural steroid injection.   Patient had a MRI that shows a radicular component along with pain referred into the lower extremity. Patient has trialed rehab (Home exercise, physical therapy or chiropractic care) and medications. I will schedule a caudal epidural steroid injection.   Risk, benefits, pros and cons of procedure were explained to the patient using models and diagrams and their questions were answered.    The patient has severe anxiety of procedures that necessitates monitored anesthesia care (MAC). The procedure performed will be close to major nerves, arteries, and spinal cord and/or joint structures. Due to the proximity of these structures, we need the patient to be still during the procedure.  With the help of MAC, this will be safely achieved and decrease the risk of any complications.  Neuropsychological SOAPP and PCS testing was performed as an evaluation of cognition, mood, personality, behavior to assess likelihood of addiction, misuse, other aberrant medication-related behaviors, and different thoughts and feelings that may be associated with pain. The total time spent rendering and interpreting the service was approximately 20 minutes. Results will be implemented in the appropriate care of the patient  A total of 36 minutes was spent on this visit, reviewing previous notes/consultations/imaging, counseling the patient on appropriate biomechanics impacting the pain, ordering tests if needed, refilling meds if needed, and documenting the findings in the note.  Entered by Salina Pope, acting as scribe for Dr. Correa.   The documentation recorded by the scribe, in my presence, accurately reflects the service I personally performed, and the decisions made by me with my edits as appropriate.   Best Regards,  Davon Correa MD  Board Certified, Anesthesiology  Board Certified, Pain Medicine

## 2023-09-06 NOTE — PHYSICAL EXAM
[de-identified] : NECK - tenderness over the cervical paraspinals. ROM restricted. Pain with flexion. Positive spurling bilaterally.  BACK - tenderness into the lumbar paraspinals. ROM restricted. Pain with flexion. Positive SLR bilaterally.

## 2023-09-06 NOTE — HISTORY OF PRESENT ILLNESS
[FreeTextEntry1] : HISTORY OF PRESENT ILLNESS, ORIGINAL: Ms. Oro is a 26 year old female complaining of lower back and neck pain. In regards to her neck pain, she states this pain happens constantly and is severe. She currently rates 10/10 on the pain scale. She states the pain radiates into her left arm causing numbness and tingling as well as spasms. She states the pain is present daily. She states she has trouble getting out of bed due to the severe pain.  In regards to her lower back pain, she states this pain happens constantly and is severe. She states the pain is mainly across the lower lumbar spine with some radiation to the buttock.  The pain started after no inciting event. The patient has had this pain for 5 years. Patient describes the pain as moderate to severe. During the last month the pain has been nearly constant with symptoms worsening morning, 90. Pain described as shooting, pressure-like, dull/achy. Pain is increased with standing, sitting, walking, bowel movement. Pain is decreased with lying down, sitting, coughing/knees. Pain is not changed with exercising. Bowel or bladder habits have not changed.  TODAY: Revisit encounter after a long delay.   As for her neck pain, she is presenting with a severe flareup.  She currently rates pain as a 10 out of 10 on the pain scale.  She states pain is constant in nature and travels into the arms with associated numbness, tingling and spasms.  She also has chief complaints of trouble grasping objects.  As for her lower back pain, she rates the pain as a 10 out of 10 on the pain scale.  She states pain is constant in nature and radiates into the legs with associated numbness, tingling and spasms.  She has significant difficulty walking, sitting or standing secondary to the pain.

## 2023-09-06 NOTE — DATA REVIEWED
[FreeTextEntry1] : MRI of the cervical spine taken on 09/05/2021 showed straightening of the cervical lordosis. There is no evidence of spondylolisthesis. No acute compression fracture. Mild height loss of the C4-5 and C6 vertebral bodies on a degenerative basis. Mild multilevel spondylitic changes of the cervical spine, most prominent at the C3-4, C4-5 and C5-6 levels, where there small disc osteophyte complexes contributing to mild spinal canal stenosis. No significant neural foraminal narrowing. The cervical spinal cord is normal in signal. There is no epidural collection or mass. There is mild dural ext ostia at the level of C6 through T1. Diffusely decreased T1 bone marrow signal, which is nonspecific. No aggressive or expansile osseous lesions.  MRI of the thoracic spine taken on 09/05/2021 showed a thoracic kyphosis is preserved. There is no evidence of spondylolisthesis. No acute compression fracture. Thoracic vertebral body heights are maintained. There is prominence of the central canal at the level of the T8 through T10 vertebral bodies measuring up to 13 mm in maximal cross-sectional diameter. There is no epidural collection or mass. Diffusely decreased T1 bone marrow signal, which is nonspecific. No aggressive or expansile osseous lesion.  MRI of the lumbar spine taken on 09/05/2021 showed there are 5 non rib-bearing lumbar vertebral bodies. The normal lumbar lordosis preserved. There is no evidence of spondylolisthesis. No acute compression fracture. Lumbar vertebral body heights are maintained. There is prominence of the central canal at the level of the conus medullaris. The conus medullaris and cauda clean and nerve roots are otherwise unremarkable; the conus terminates at the level of L1-2. There is no epidural collection or mass. Diffusely decreased T1 bone marrow signal, which is nonspecific. Small right renal cyst. Small volume pelvic the free fluid.  SOAPP: Scored a 2 , low risk.   [place] REGISTRY: Reviewed .     UDS: No data obtained today.     Medications that trigger a UDS: Benzodiazepines (Ativan, Xanax, Valium) etc, Barbiturates, Narcotics (Avinza, Butrans, hydrocodone, Codeine, Yaneth, Methadone, Morphine, MS Contin, Opana, oxycodone, Oxycontin, Suboxone etc), Pregabalin (Lyrica), Tramadol (Ultacet, Utram etc), Tapentadol, (Nucynta) and Elist Drugs (cocaine, THC, Etc.)   Risk factors: Bipolar Illness, positive for any an illicit drugs, history of any ETOH and drug abuse, any signs of diversion, Sharing Meds, selling meds. Non consistent [place] drug reporting and above 120meq of morphine   Low risk: Patient has combination of a low risk SOAP and no risk factors. UDS would be repeated randomly every quarter

## 2023-09-06 NOTE — PHYSICAL EXAM
[de-identified] : NECK - tenderness over the cervical paraspinals. ROM restricted. Pain with flexion. Positive spurling bilaterally.  BACK - tenderness into the lumbar paraspinals. ROM restricted. Pain with flexion. Positive SLR bilaterally.

## 2023-09-13 ENCOUNTER — OUTPATIENT (OUTPATIENT)
Dept: OUTPATIENT SERVICES | Facility: HOSPITAL | Age: 28
LOS: 1 days | End: 2023-09-13
Payer: COMMERCIAL

## 2023-09-13 ENCOUNTER — APPOINTMENT (OUTPATIENT)
Dept: PSYCHIATRY | Facility: CLINIC | Age: 28
End: 2023-09-13
Payer: COMMERCIAL

## 2023-09-13 DIAGNOSIS — F60.3 BORDERLINE PERSONALITY DISORDER: ICD-10-CM

## 2023-09-13 PROCEDURE — 99214 OFFICE O/P EST MOD 30 MIN: CPT | Mod: 95

## 2023-09-13 PROCEDURE — ZZZZZ: CPT

## 2023-09-14 ENCOUNTER — APPOINTMENT (OUTPATIENT)
Dept: PAIN MANAGEMENT | Facility: CLINIC | Age: 28
End: 2023-09-14
Payer: MEDICAID

## 2023-09-14 DIAGNOSIS — F60.3 BORDERLINE PERSONALITY DISORDER: ICD-10-CM

## 2023-09-14 PROCEDURE — 93770 DETERMINATION VENOUS PRESS: CPT | Mod: 59

## 2023-09-14 PROCEDURE — 72040 X-RAY EXAM NECK SPINE 2-3 VW: CPT

## 2023-09-14 PROCEDURE — 62321 NJX INTERLAMINAR CRV/THRC: CPT

## 2023-09-14 PROCEDURE — 93040 RHYTHM ECG WITH REPORT: CPT | Mod: 59

## 2023-09-14 PROCEDURE — 94761 N-INVAS EAR/PLS OXIMETRY MLT: CPT | Mod: 59

## 2023-09-14 PROCEDURE — 00600 ANES PX CRV SPINE&CORD NOS: CPT | Mod: QZ,P3

## 2023-09-15 ENCOUNTER — APPOINTMENT (OUTPATIENT)
Dept: NEUROSURGERY | Facility: CLINIC | Age: 28
End: 2023-09-15

## 2023-09-21 ENCOUNTER — APPOINTMENT (OUTPATIENT)
Dept: PAIN MANAGEMENT | Facility: CLINIC | Age: 28
End: 2023-09-21
Payer: MEDICAID

## 2023-09-21 PROCEDURE — 00630 ANES PX LUMBAR REGION NOS: CPT | Mod: QZ,P2

## 2023-09-21 PROCEDURE — 99152Z: CUSTOM

## 2023-09-21 PROCEDURE — 93770 DETERMINATION VENOUS PRESS: CPT

## 2023-09-21 PROCEDURE — 72100 X-RAY EXAM L-S SPINE 2/3 VWS: CPT

## 2023-09-21 PROCEDURE — 62323 NJX INTERLAMINAR LMBR/SAC: CPT

## 2023-09-21 PROCEDURE — 93040 RHYTHM ECG WITH REPORT: CPT | Mod: 59

## 2023-09-27 ENCOUNTER — APPOINTMENT (OUTPATIENT)
Dept: PAIN MANAGEMENT | Facility: CLINIC | Age: 28
End: 2023-09-27

## 2023-09-28 ENCOUNTER — APPOINTMENT (OUTPATIENT)
Dept: PAIN MANAGEMENT | Facility: CLINIC | Age: 28
End: 2023-09-28

## 2023-09-28 ENCOUNTER — APPOINTMENT (OUTPATIENT)
Dept: PAIN MANAGEMENT | Facility: CLINIC | Age: 28
End: 2023-09-28
Payer: MEDICAID

## 2023-09-28 ENCOUNTER — APPOINTMENT (OUTPATIENT)
Dept: PSYCHIATRY | Facility: CLINIC | Age: 28
End: 2023-09-28
Payer: COMMERCIAL

## 2023-09-28 ENCOUNTER — OUTPATIENT (OUTPATIENT)
Dept: OUTPATIENT SERVICES | Facility: HOSPITAL | Age: 28
LOS: 1 days | End: 2023-09-28
Payer: COMMERCIAL

## 2023-09-28 DIAGNOSIS — F60.3 BORDERLINE PERSONALITY DISORDER: ICD-10-CM

## 2023-09-28 PROCEDURE — 62321 NJX INTERLAMINAR CRV/THRC: CPT

## 2023-09-28 PROCEDURE — ZZZZZ: CPT

## 2023-09-28 PROCEDURE — 00600 ANES PX CRV SPINE&CORD NOS: CPT | Mod: QZ,P2

## 2023-09-28 PROCEDURE — 99214 OFFICE O/P EST MOD 30 MIN: CPT | Mod: 95

## 2023-09-28 PROCEDURE — 93040 RHYTHM ECG WITH REPORT: CPT | Mod: 59

## 2023-09-28 PROCEDURE — 99152Z: CUSTOM

## 2023-09-28 PROCEDURE — 93770 DETERMINATION VENOUS PRESS: CPT | Mod: 59

## 2023-09-28 PROCEDURE — 72040 X-RAY EXAM NECK SPINE 2-3 VW: CPT

## 2023-09-28 RX ORDER — HYDROXYZINE HYDROCHLORIDE 50 MG/1
50 TABLET ORAL EVERY 6 HOURS
Qty: 120 | Refills: 0 | Status: DISCONTINUED | COMMUNITY
Start: 2022-01-19 | End: 2023-09-28

## 2023-09-29 DIAGNOSIS — F60.3 BORDERLINE PERSONALITY DISORDER: ICD-10-CM

## 2023-10-01 ENCOUNTER — EMERGENCY (EMERGENCY)
Facility: HOSPITAL | Age: 28
LOS: 0 days | Discharge: HOME | End: 2023-10-01
Attending: EMERGENCY MEDICINE
Payer: MEDICAID

## 2023-10-01 VITALS
RESPIRATION RATE: 18 BRPM | HEIGHT: 59 IN | WEIGHT: 169.98 LBS | OXYGEN SATURATION: 100 % | SYSTOLIC BLOOD PRESSURE: 125 MMHG | TEMPERATURE: 99 F | HEART RATE: 105 BPM | DIASTOLIC BLOOD PRESSURE: 69 MMHG

## 2023-10-01 VITALS — HEART RATE: 84 BPM

## 2023-10-01 DIAGNOSIS — Z88.0 ALLERGY STATUS TO PENICILLIN: ICD-10-CM

## 2023-10-01 DIAGNOSIS — Z86.59 PERSONAL HISTORY OF OTHER MENTAL AND BEHAVIORAL DISORDERS: ICD-10-CM

## 2023-10-01 DIAGNOSIS — F17.200 NICOTINE DEPENDENCE, UNSPECIFIED, UNCOMPLICATED: ICD-10-CM

## 2023-10-01 DIAGNOSIS — E03.9 HYPOTHYROIDISM, UNSPECIFIED: ICD-10-CM

## 2023-10-01 DIAGNOSIS — K02.9 DENTAL CARIES, UNSPECIFIED: ICD-10-CM

## 2023-10-01 DIAGNOSIS — Z91.018 ALLERGY TO OTHER FOODS: ICD-10-CM

## 2023-10-01 DIAGNOSIS — K08.89 OTHER SPECIFIED DISORDERS OF TEETH AND SUPPORTING STRUCTURES: ICD-10-CM

## 2023-10-01 DIAGNOSIS — Z87.19 PERSONAL HISTORY OF OTHER DISEASES OF THE DIGESTIVE SYSTEM: ICD-10-CM

## 2023-10-01 DIAGNOSIS — Z88.1 ALLERGY STATUS TO OTHER ANTIBIOTIC AGENTS STATUS: ICD-10-CM

## 2023-10-01 DIAGNOSIS — Z88.8 ALLERGY STATUS TO OTHER DRUGS, MEDICAMENTS AND BIOLOGICAL SUBSTANCES: ICD-10-CM

## 2023-10-01 DIAGNOSIS — K03.81 CRACKED TOOTH: ICD-10-CM

## 2023-10-01 DIAGNOSIS — F32.A DEPRESSION, UNSPECIFIED: ICD-10-CM

## 2023-10-01 PROCEDURE — 99283 EMERGENCY DEPT VISIT LOW MDM: CPT

## 2023-10-01 RX ORDER — AZITHROMYCIN 500 MG/1
0 TABLET, FILM COATED ORAL
Qty: 1 | Refills: 0
Start: 2023-10-01

## 2023-10-01 RX ORDER — AZITHROMYCIN 500 MG/1
1 TABLET, FILM COATED ORAL
Qty: 1 | Refills: 0
Start: 2023-10-01

## 2023-10-01 NOTE — ED PROVIDER NOTE - OBJECTIVE STATEMENT
28-year-old female complaining of right lower dental pain since today after her tooth cracked.  No fevers.

## 2023-10-01 NOTE — ED PROVIDER NOTE - PATIENT PORTAL LINK FT
You can access the FollowMyHealth Patient Portal offered by [facility] by registering at the following website: [email or web address]. By joining [facility]’s FollowMyHealth portal, you will also be able to view your health information using other applications (apps) compatible with our system.

## 2023-10-01 NOTE — ED PROVIDER NOTE - CLINICAL SUMMARY MEDICAL DECISION MAKING FREE TEXT BOX
28-year-old woman complaining of dental pain after her tooth cracked.  No constitutional symptoms.  Extensive dental decay.  Antibiotics (allergic to clinda and penicillin), close dental follow-up.

## 2023-10-01 NOTE — ED ADULT TRIAGE NOTE - HEIGHT IN INCHES
Puma Garcia is a 37 y.o.  female and presents with Chief Complaint Patient presents with  Complete Physical  
 
 
Subjective: Well Adult Physical  
Patient here for a comprehensive well woman physical exam.The patient reports no problems Do you take any herbs or supplements that were not prescribed by a doctor? no Are you taking calcium supplements? no Are you taking aspirin daily? yes Appointment with Gastroenterology on 4/18/19 for colon cancer screening. Has not had mammogram done yet. Additional Concerns: No  
 
 
 
There is no problem list on file for this patient. Current Outpatient Medications Medication Sig Dispense Refill  cholecalciferol (VITAMIN D3) 1,000 unit tablet Take  by mouth daily.  iron 18 mg tab Take  by mouth. No Known Allergies Past Medical History:  
Diagnosis Date  Iron (Fe) deficiency anemia  Vitamin D deficiency Past Surgical History:  
Procedure Laterality Date  HX MYOMECTOMY  HX PARTIAL THYROIDECTOMY Family History Problem Relation Age of Onset  No Known Problems Mother  Cancer Father   
     colon Social History Tobacco Use  Smoking status: Never Smoker  Smokeless tobacco: Never Used Substance Use Topics  Alcohol use: Not Currently ROS History obtained from the patient General ROS: negative for - chills or fever Psychological ROS: negative for - anxiety or depression Ophthalmic ROS: negative for - blurry vision or double vision ENT ROS: negative for - headaches Breast ROS: negative for breast lumps Respiratory ROS: no cough, shortness of breath, or wheezing Cardiovascular ROS: no chest pain or dyspnea on exertion Gastrointestinal ROS: no abdominal pain, change in bowel habits, or black or bloody stools Genito-Urinary ROS: no dysuria, trouble voiding, or hematuria Musculoskeletal ROS: negative for - joint pain, joint stiffness or joint swelling Neurological ROS: no TIA or stroke symptoms Dermatological ROS: negative for - rash All other systems reviewed and are negative. Objective: 
Vitals:  
 04/10/19 1109 BP: (!) 146/96 Pulse: 71 Resp: 16 Weight: 148 lb (67.1 kg) Height: 5' 2.3\" (1.582 m) PainSc:   0 - No pain LMP: 02/27/2019 PE General appearance - alert, well appearing, and in no distress Mental status - normal mood, behavior, speech, dress, motor activity, and thought processes Eyes - pupils equal and reactive, extraocular eye movements intact Ears - bilateral TM's and external ear canals normal 
Mouth - mucous membranes moist, pharynx normal without lesions Neck - supple, no significant adenopathy Chest - clear to auscultation, no wheezes, rales or rhonchi, symmetric air entry Heart - normal rate, regular rhythm, normal S1, S2, no murmurs, rubs, clicks or gallops Abdomen - soft, nontender, nondistended, no masses or organomegaly Breasts - breasts appear normal, no suspicious masses, no skin or nipple changes or axillary nodes Pelvic - normal external genitalia, vulva, vagina, cervix, uterus and adnexa, PAP: Pap smear done today, exam chaperoned by Dang Barkley LPN Musculoskeletal - no joint tenderness, deformity or swelling Extremities - peripheral pulses normal, no pedal edema, no clubbing or cyanosis Skin - normal coloration and turgor, no rashes, no suspicious skin lesions noted LABS Lab Results Component Value Date/Time WBC 6.3 03/27/2019 03:18 PM  
 HGB 13.3 03/27/2019 03:18 PM  
 HCT 40.1 03/27/2019 03:18 PM  
 PLATELET 319 67/93/8990 03:18 PM  
 MCV 93.7 03/27/2019 03:18 PM  
 
Lab Results Component Value Date/Time TSH 4.40 (H) 03/27/2019 03:18 PM  
 T4, Free 0.9 03/27/2019 03:18 PM  
  
Lab Results Component Value Date/Time  Sodium 141 03/27/2019 03:18 PM  
 Potassium 4.4 03/27/2019 03:18 PM  
 Chloride 106 03/27/2019 03:18 PM  
 CO2 29 03/27/2019 03:18 PM  
 Anion gap 6 03/27/2019 03:18 PM  
 Glucose 90 03/27/2019 03:18 PM  
 BUN 15 03/27/2019 03:18 PM  
 Creatinine 0.61 03/27/2019 03:18 PM  
 BUN/Creatinine ratio 25 (H) 03/27/2019 03:18 PM  
 GFR est AA >60 03/27/2019 03:18 PM  
 GFR est non-AA >60 03/27/2019 03:18 PM  
 Calcium 8.6 03/27/2019 03:18 PM  
 Bilirubin, total 0.2 03/27/2019 03:18 PM  
 ALT (SGPT) 20 03/27/2019 03:18 PM  
 AST (SGOT) 13 (L) 03/27/2019 03:18 PM  
 Alk. phosphatase 63 03/27/2019 03:18 PM  
 Protein, total 7.5 03/27/2019 03:18 PM  
 Albumin 4.1 03/27/2019 03:18 PM  
 Globulin 3.4 03/27/2019 03:18 PM  
 A-G Ratio 1.2 03/27/2019 03:18 PM  
  
3/28/2019 12:37 PM - Nick, Lab In [facility]  
 
Component Value Flag Ref Range Units Status Calcitriol (Vit D 1, 25 di-OH) 57.1   19.9 - 79.3 pg/mL Final  
 
3/27/2019  9:36 PM - Nick, Lab In [facility]  
 
Component Value Flag Ref Range Units Status Vitamin B12 580   211 - 911 pg/mL Final  
Folate >20.0  High   3.10 - 17.50 ng/mL Final  
 
3/27/2019  9:36 PM - Nick, Lab In [facility]  
 
Component Value Flag Ref Range Units Status Iron 52   50 - 175 ug/dL Final  
Comment:  
Patients receiving metal-binding drugs (e.g. deferoxamine) may show spuriously depressed iron values, as chelated iron may not properly react in the iron assay. TIBC 347   250 - 450 ug/dL Final  
Iron % saturation 15    % Final  
 
 
TESTS Hearing Screening (04/10/2019) Edited by: Bud Sheth LPN  
 [id number] 1000hz Angela.Natalia 3000hz Ini.Cordial [id number] Right ear   Fail Pass Pass  Pass Left ear   Fail Pass Pass  Fail Vision Screening (04/10/2019) Edited by: Bud Sheth LPN Right eye Left eye Both eyes Without correction 20 13 20 30 20 13 Assessment/Plan:   
1. Well Woman Exam- completed 2. Irregular Menses- POC urine pregnancy negative; discussed possibility of irregular menses having to do with pre menopause; patient verbalized understanding; continue to monitor 3. Subclinical Hypothyroidism- history of partial thyroidectomy; TSH mildly elevated; recheck in 8 weeks 4. Elevated Blood Pressure reading- mildly elevated; discussed low sodium diet; recheck at next office visit Lab review: labs reviewed, I note that TSH mildly abnormal but acceptable Today's Visit:  
Orders Placed This Encounter  AMB POC URINE PREGNANCY TEST, VISUAL COLOR COMPARISON  
 PAP IG, APTIMA HPV AND RFX 16/18,45 ([id number]) Standing Status:   Future Standing Expiration Date:   7/9/2019 Order Specific Question:   Pap Source? Answer:   Endocervical  
  Order Specific Question:   Total Hysterectomy? Answer:   No  
  Order Specific Question:   Supracervical Hysterectomy? Answer:   No  
  Order Specific Question:   Post Menopausal?  
  Answer:   No  
  Order Specific Question:   Hormone Therapy? Answer:   No  
  Order Specific Question:   IUD? Answer:   No  
  Order Specific Question:   Abnormal Bleeding? Answer:   No  
  Order Specific Question:   Pregnant Answer:   No  
  Order Specific Question:   Post Partum? Answer:   No  
  Order Specific Question:   Date of LMP? Answer:   2/27/2019 Health Maintenance:  
Mammogram- orders placed 3/27/19- patient to call to get it scheduled I have discussed the diagnosis with the patient and the intended plan as seen in the above orders. The patient has received an after-visit summary and questions were answered concerning future plans. I have discussed medication side effects and warnings with the patient as well. I have reviewed the plan of care with the patient, accepted their input and they are in agreement with the treatment goals. Follow-up and Dispositions · Return in about 2 months (around 6/10/2019) for for [address] More than 1/2 of this 25 minute visit was spent in counseling and coordination of care, as described above.  
 
KYLER Benz 
 11

## 2023-10-01 NOTE — ED ADULT NURSE NOTE - OBJECTIVE STATEMENT
Pt. c/o R lower dental pain. Pt. states "I was drinking water this morning when my tooth crumbled. I would like it taken out so I don't have to keep swallowing pieces of tooth."

## 2023-10-01 NOTE — ED ADULT TRIAGE NOTE - BIRTH SEX
HISTORY AND PHYSICAL    PRENATAL COURSE / MATERNAL DATA:     Baby Boy Tj Mae is a Birth Weight: 6 lb 5 oz (2.863 kg) male  born at Gestational Age: 36w0d on 2023 at 7:33 AM    Information for the patient's mother:  Walter Harding [[id number]]   28 y.o.   OB History          5    Para   5    Term   5            AB        Living   1         SAB        IAB        Ectopic        Molar        Multiple   0    Live Births   1             Prenatal labs:  - HBsAg: negative  - GBS: negative  - HIV: negative  - Chlamydia: negative  - GC: negative  - Rubella: immune  - RPR: negative  - Hepatits C: not reported  - HSV: not reported  - UDS: negative      Maternal blood type: Information for the patient's mother:  Walter Harding [[id number]]   A POS  Prenatal care: adequate  Prenatal medications: PNV  Pregnancy complications: none    Alcohol use: denied  Tobacco use: denied  Drug use: denied      DELIVERY HISTORY:      Delivery date and time: 2023 at 7:33 AM  Delivery Method: , Low Transverse  Delivery physician: PIPO EMERSON     complications: none  Maternal antibiotics: cefoxitin x1, given for surgical prophylaxis  Rupture of membranes (date and time): 2023 at 7:32 AM (occurred at time of delivery)  Amniotic fluid: clear  Presentation: Breech [3]  Resuscitation required: none  Apgar scores:     APGAR One: 8     APGAR Five: 9     APGAR Ten: N/A      OBJECTIVE / ADMISSION PHYSICAL EXAM:      BP 76/33   Pulse 144   Temp 97.7 °F (36.5 °C)   Resp 40   Ht 20\" (50.8 cm) Comment: Filed from Delivery Summary  Wt 6 lb 5 oz (2.863 kg) Comment: Filed from Delivery Summary  HC 34.9 cm (13.75\") Comment: Filed from Delivery Summary  BMI 11.10 kg/m²     WT:  Birth Weight: 6 lb 5 oz (2.863 kg)  HT: Birth Length: 20\" (50.8 cm) (Filed from Delivery Summary)  HC:  Birth Head Circumference: 34.9 cm (13.75\")       Physical Exam:  General Appearance: Well-appearing, vigorous, strong cry, in no acute distress  Head: Anterior fontanelle is open, soft and flat  Ears: Well-positioned, well-formed pinnae  Eyes: Sclerae white, red reflex normal bilaterally  Nose: Clear, normal mucosa  Throat: Lips, tongue and mucosa are pink, moist and intact, palate intact  Neck: Supple, symmetrical  Chest: Lungs are clear to auscultation bilaterally, respirations are unlabored without grunting or retractions evident  Heart: Regular rate and rhythm, normal S1 and S2, no murmurs or gallops appreciated, strong and equal femoral pulses, brisk capillary refill  Abdomen: Soft, non-tender, non-distended, bowel sounds active, no masses or hepatosplenomegaly palpated   Hips: Negative Ryan and Ortolani, no hip laxity appreciated  : Normal male external genitalia  Sacrum: Intact without a dimple evident  Extremities: Good range of motion of all extremities  Skin: Warm, normal color, no rashes evident, 2 dime size bruises over right plantar pedal surface   Neuro: Easily aroused, good symmetric tone and strength, positive Grayson and suck reflexes       SIGNIFICANT LABS/IMAGING:     No results found for any previous visit.         ASSESSMENT:     Baby Nikhil Hernandez is a Birth Weight: 6 lb 5 oz (2.863 kg) male  born at Gestational Age: 36w0d    Birthweight for gestational age: appropriate for gestational age  Head circumference for gestational age: normocephalic  Maternal GBS: negative    Patient Active Problem List   Diagnosis    Normal  (single liveborn)       PLAN:     - Admit to  nursery  - Provide routine  care  - Follow up PCP: Samantha Saavedra MD      Electronically signed by Kalyan Kruse DO Female

## 2023-10-01 NOTE — ED PROVIDER NOTE - NS ED ROS FT
Constitutional: (-) fever  Dental: (+) right lower dental pain  Neurological: (-) altered mental status

## 2023-10-01 NOTE — ED PROVIDER NOTE - PHYSICAL EXAMINATION
Physical Exam    Vital Signs: I have reviewed the initial vital signs.  Constitutional: well-nourished, appears stated age, no acute distress  Dental: + pain to right lower last molar. + cavity noted  Neuro: AOx3, No focal deficits noted

## 2023-10-01 NOTE — ED ADULT NURSE NOTE - PAIN: PRESENCE, MLM
[FreeTextEntry1] : This is a 34 year old man with no significant past medical history, he has been heavy for most of his life, blood pressure was initially 182/125 by automatic cuff.  Intended to repeat blood pressure during exam however when blood was about to be drawn, patient had an anxiety related presyncopal episode with dizziness and light headdress.  Our technician did not even get the chance to even insert the needle. Patient was lied down, initial blood pressure during this event was 66/palp.  After 5 minutes color returned to his face and blood pressure was 80/56.  Patient was awake and Allert although pale diaphoretic, and dizzy for this.  after 15-20 minutes blood pressure was variable from the 80/60 to 100/60 however attempts to sit him up resulted in a recurrence of the dizziness.  After 1 hour blood pressure returned to 98/62 sitting, patient was able to stand and put his pants on without difficulty, and was able to walk around the office without difficulty, and repeat blood pressure after walking about 150 feet was 116/68.  Patient relatively asymptomatic at that point.  \par \par \par EKG before the pre-syncopal event was NSR with an incomplete right bundle, no major changes, a 2nd EKG done 30 minutes into the event was identical.  \par Patient denies ever having a pre-syncopal event outside of medical setting, he does have a history of severe health care related anxiety.\par \par U/A showed 30 + protein, sent for microscopy.  \par As I had never had a chance t o repeat his blood pressure before the pre-syncopal episode.   Unclear if he was ever really that hypertensive.  Patient normotensive 1 hour after the pre-syncopal event.  \par \par Unable to check bloodwork due to the presyncopal event, Reccomended  come down in a month and stay well hydrated and non fasting for blood draw and have him lying down for it.  \par \par Physical exam unremarkable aside for mthe weight and the initial pallor which resolved.  \par \par \par Patient normotensive after event would not start an antihypertensive agent, will repeat blood pressure on follow up.  \par \par Flu shot contraindicated given the presyncopal event.  
R lower mouth/complains of pain/discomfort

## 2023-10-01 NOTE — ED PROVIDER NOTE - NSFOLLOWUPCLINICS_GEN_ALL_ED_FT
[facility]  Dental  [address] [place] [id number]  Phone: [phone]  Fax:   Follow Up Time: 1-3 Days

## 2023-10-11 ENCOUNTER — NON-APPOINTMENT (OUTPATIENT)
Age: 28
End: 2023-10-11

## 2023-10-12 ENCOUNTER — APPOINTMENT (OUTPATIENT)
Dept: PSYCHIATRY | Facility: CLINIC | Age: 28
End: 2023-10-12
Payer: COMMERCIAL

## 2023-10-12 ENCOUNTER — OUTPATIENT (OUTPATIENT)
Dept: OUTPATIENT SERVICES | Facility: HOSPITAL | Age: 28
LOS: 1 days | End: 2023-10-12
Payer: COMMERCIAL

## 2023-10-12 ENCOUNTER — APPOINTMENT (OUTPATIENT)
Dept: PAIN MANAGEMENT | Facility: CLINIC | Age: 28
End: 2023-10-12

## 2023-10-12 DIAGNOSIS — F32.A DEPRESSION, UNSPECIFIED: ICD-10-CM

## 2023-10-12 DIAGNOSIS — F90.9 ATTENTION-DEFICIT HYPERACTIVITY DISORDER, UNSPECIFIED TYPE: ICD-10-CM

## 2023-10-12 DIAGNOSIS — F43.10 POST-TRAUMATIC STRESS DISORDER, UNSPECIFIED: ICD-10-CM

## 2023-10-12 DIAGNOSIS — F60.3 BORDERLINE PERSONALITY DISORDER: ICD-10-CM

## 2023-10-12 PROCEDURE — ZZZZZ: CPT

## 2023-10-12 PROCEDURE — 99214 OFFICE O/P EST MOD 30 MIN: CPT | Mod: 95

## 2023-10-12 RX ORDER — LURASIDONE HYDROCHLORIDE 20 MG/1
20 TABLET, FILM COATED ORAL DAILY
Qty: 30 | Refills: 0 | Status: DISCONTINUED | COMMUNITY
Start: 2023-07-05 | End: 2023-10-12

## 2023-10-13 DIAGNOSIS — F32.A DEPRESSION, UNSPECIFIED: ICD-10-CM

## 2023-10-13 DIAGNOSIS — F60.3 BORDERLINE PERSONALITY DISORDER: ICD-10-CM

## 2023-10-18 ENCOUNTER — APPOINTMENT (OUTPATIENT)
Dept: PAIN MANAGEMENT | Facility: CLINIC | Age: 28
End: 2023-10-18
Payer: MEDICAID

## 2023-10-18 PROCEDURE — 72100 X-RAY EXAM L-S SPINE 2/3 VWS: CPT

## 2023-10-18 PROCEDURE — 00630 ANES PX LUMBAR REGION NOS: CPT | Mod: QZ,P3

## 2023-10-18 PROCEDURE — 99152Z: CUSTOM

## 2023-10-18 PROCEDURE — 62323 NJX INTERLAMINAR LMBR/SAC: CPT

## 2023-10-18 PROCEDURE — 93040 RHYTHM ECG WITH REPORT: CPT | Mod: 59

## 2023-10-18 PROCEDURE — 93770 DETERMINATION VENOUS PRESS: CPT

## 2023-10-19 ENCOUNTER — APPOINTMENT (OUTPATIENT)
Dept: PAIN MANAGEMENT | Facility: CLINIC | Age: 28
End: 2023-10-19

## 2023-10-23 ENCOUNTER — NON-APPOINTMENT (OUTPATIENT)
Age: 28
End: 2023-10-23

## 2023-10-26 ENCOUNTER — NON-APPOINTMENT (OUTPATIENT)
Age: 28
End: 2023-10-26

## 2023-10-31 ENCOUNTER — APPOINTMENT (OUTPATIENT)
Dept: NEUROSURGERY | Facility: CLINIC | Age: 28
End: 2023-10-31

## 2023-11-02 ENCOUNTER — APPOINTMENT (OUTPATIENT)
Dept: PAIN MANAGEMENT | Facility: CLINIC | Age: 28
End: 2023-11-02
Payer: MEDICAID

## 2023-11-02 PROCEDURE — 00630 ANES PX LUMBAR REGION NOS: CPT | Mod: QZ,P3

## 2023-11-02 PROCEDURE — 72100 X-RAY EXAM L-S SPINE 2/3 VWS: CPT

## 2023-11-02 PROCEDURE — 93770 DETERMINATION VENOUS PRESS: CPT | Mod: 59

## 2023-11-02 PROCEDURE — 93040 RHYTHM ECG WITH REPORT: CPT | Mod: 59

## 2023-11-02 PROCEDURE — 99152Z: CUSTOM

## 2023-11-02 PROCEDURE — 62323 NJX INTERLAMINAR LMBR/SAC: CPT

## 2023-11-04 ENCOUNTER — NON-APPOINTMENT (OUTPATIENT)
Age: 28
End: 2023-11-04

## 2023-11-06 ENCOUNTER — APPOINTMENT (OUTPATIENT)
Dept: PSYCHIATRY | Facility: CLINIC | Age: 28
End: 2023-11-06

## 2023-11-06 ENCOUNTER — NON-APPOINTMENT (OUTPATIENT)
Age: 28
End: 2023-11-06

## 2023-11-16 ENCOUNTER — OUTPATIENT (OUTPATIENT)
Dept: OUTPATIENT SERVICES | Facility: HOSPITAL | Age: 28
LOS: 1 days | Discharge: HOME | End: 2023-11-16
Payer: COMMERCIAL

## 2023-11-17 ENCOUNTER — APPOINTMENT (OUTPATIENT)
Dept: PSYCHIATRY | Facility: CLINIC | Age: 28
End: 2023-11-17
Payer: COMMERCIAL

## 2023-11-17 ENCOUNTER — OUTPATIENT (OUTPATIENT)
Dept: OUTPATIENT SERVICES | Facility: HOSPITAL | Age: 28
LOS: 1 days | End: 2023-11-17
Payer: COMMERCIAL

## 2023-11-17 DIAGNOSIS — F60.3 BORDERLINE PERSONALITY DISORDER: ICD-10-CM

## 2023-11-17 DIAGNOSIS — F32.A DEPRESSION, UNSPECIFIED: ICD-10-CM

## 2023-11-17 PROCEDURE — ZZZZZ: CPT

## 2023-11-17 PROCEDURE — 99214 OFFICE O/P EST MOD 30 MIN: CPT | Mod: 95

## 2023-11-18 DIAGNOSIS — F60.3 BORDERLINE PERSONALITY DISORDER: ICD-10-CM

## 2023-11-18 DIAGNOSIS — F32.A DEPRESSION, UNSPECIFIED: ICD-10-CM

## 2023-11-20 DIAGNOSIS — F60.3 BORDERLINE PERSONALITY DISORDER: ICD-10-CM

## 2023-11-20 DIAGNOSIS — F90.9 ATTENTION-DEFICIT HYPERACTIVITY DISORDER, UNSPECIFIED TYPE: ICD-10-CM

## 2023-11-20 DIAGNOSIS — F42.9 OBSESSIVE-COMPULSIVE DISORDER, UNSPECIFIED: ICD-10-CM

## 2023-11-20 DIAGNOSIS — F43.10 POST-TRAUMATIC STRESS DISORDER, UNSPECIFIED: ICD-10-CM

## 2023-11-20 PROCEDURE — 90791 PSYCH DIAGNOSTIC EVALUATION: CPT

## 2023-11-28 ENCOUNTER — APPOINTMENT (OUTPATIENT)
Dept: PAIN MANAGEMENT | Facility: CLINIC | Age: 28
End: 2023-11-28

## 2023-12-01 ENCOUNTER — APPOINTMENT (OUTPATIENT)
Dept: PAIN MANAGEMENT | Facility: CLINIC | Age: 28
End: 2023-12-01

## 2023-12-01 ENCOUNTER — APPOINTMENT (OUTPATIENT)
Dept: PAIN MANAGEMENT | Facility: CLINIC | Age: 28
End: 2023-12-01
Payer: MEDICAID

## 2023-12-01 VITALS
SYSTOLIC BLOOD PRESSURE: 126 MMHG | WEIGHT: 170 LBS | HEART RATE: 94 BPM | DIASTOLIC BLOOD PRESSURE: 101 MMHG | HEIGHT: 59 IN | BODY MASS INDEX: 34.27 KG/M2

## 2023-12-01 PROCEDURE — 99214 OFFICE O/P EST MOD 30 MIN: CPT

## 2023-12-15 ENCOUNTER — OUTPATIENT (OUTPATIENT)
Dept: OUTPATIENT SERVICES | Facility: HOSPITAL | Age: 28
LOS: 1 days | End: 2023-12-15
Payer: COMMERCIAL

## 2023-12-15 ENCOUNTER — APPOINTMENT (OUTPATIENT)
Dept: PSYCHIATRY | Facility: CLINIC | Age: 28
End: 2023-12-15
Payer: COMMERCIAL

## 2023-12-15 DIAGNOSIS — F60.3 BORDERLINE PERSONALITY DISORDER: ICD-10-CM

## 2023-12-15 PROCEDURE — ZZZZZ: CPT

## 2023-12-15 PROCEDURE — 99214 OFFICE O/P EST MOD 30 MIN: CPT | Mod: 95

## 2023-12-15 NOTE — PLAN
[Medication education provided] : Medication education provided. [Rationale for medication choices, possible risks/precautions, benefits, alternative treatment choices, and consequences of non-treatment discussed] : Rationale for medication choices, possible risks/precautions, benefits, alternative treatment choices, and consequences of non-treatment discussed with patient/family/caregiver  [FreeTextEntry4] : To keep bpd, depression, ptsd and anxiety under control [FreeTextEntry5] : - Patient is in the ongoing process of applying to receive ECT at [facility]. So far, she has done the intake appointment with Dr. Arevalo.  - Continue Effexor XR to 150mg + 37.5mg po daily to target anxiety and depression - Continue Quetiapine extended release 400mg PO QHS - Continue Gabapentin 600 mg po BID prn for anxiety symptoms - Consider therapy referral (e.g., DBT) - Follow up in about 1 month

## 2023-12-15 NOTE — REASON FOR VISIT
[Patient preference] : as per patient preference [Telehealth (audio & video) - Individual/Group] : This visit was provided via telehealth using real-time 2-way audio visual technology. [Medical Office: ([facility])___] : The provider was located at the medical office in [unfilled]. [Home] : The patient, [unfilled], was located at home, [unfilled], at the time of the visit. [Patient] : Patient [FreeTextEntry4] : 3:15PM [FreeTextEntry5] : 3:45PM [FreeTextEntry1] : Medication management, follow-up/"I'm doing good."

## 2023-12-15 NOTE — SOCIAL HISTORY
[FreeTextEntry1] : 2 previous engagements, in same-sex relationship since 10/2018. Currently unemployed, Former [employer] ; Studying for bachelors in psychology and minor in women and gender studies, to graduate 2023. Mom in [place], Dad in [place]. Has 7 siblings, raised 5 siblings. Has friends. currently working as pharmacy tech. Domiciled with grandmother in [facility]. \par  \par  Description: medicinal marijuana for PTSD; 0.5 cartridge/month with daily use

## 2023-12-15 NOTE — PHYSICAL EXAM
[Cooperative] : cooperative [Euthymic] : euthymic [Full] : full [Clear] : clear [Linear/Goal Directed] : linear/goal directed [None Reported] : none reported [WNL] : within normal limits [Average] : average [FreeTextEntry1] : overweight [FreeTextEntry8] : 'I am depressed" [de-identified] : fair [de-identified] : fair

## 2023-12-15 NOTE — DISCUSSION/SUMMARY
[FreeTextEntry1] : Patient is a 27 y/o F (identifies as "Julio," non-binary, they/them pronouns), recently single, domiciled with grandmother, with PMH of heart murmur with atrial septal defect (benign), PPH of borderline personality disorder, PTSD, obsessive compulsive sx, with 2 prior IPP admissions and 1 prior suicide attempt at age 15 via pill ingestion, current medical marijuana use for PTSD, presenting for follow up examination.  Risk assessment: Risk factors include history of suicide attempt, personality disorder diagnosis, PTSD symptoms, depressive symptoms, SIB and recent trigger; protective factors include no acute suicidality, future oriented towards completing coursework, social support from family/friends, engaged and compliant with treatment and help seeking behavior. Patient is therefore low to mod acute suicide risk despite her chronically elevated risk.  On psychiatric evaluation, patient endorses feeling depressed but presents with euthymic mood. There is marked incongruence between her stated mood and presenting affect; high suspicion that there may be a strong component of borderline personality disorder and autism spectrum disorder potentially affecting her mood. At his time. At this time, patient is not acutely suicidal, manic, psychotic, paranoid, or delusional. Endorses ongoing anxiety and interest in ECT. Remains stable for outpatient level of care.
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
## 2023-12-16 DIAGNOSIS — F60.3 BORDERLINE PERSONALITY DISORDER: ICD-10-CM

## 2023-12-20 ENCOUNTER — APPOINTMENT (OUTPATIENT)
Dept: PAIN MANAGEMENT | Facility: CLINIC | Age: 28
End: 2023-12-20

## 2023-12-27 ENCOUNTER — APPOINTMENT (OUTPATIENT)
Dept: PAIN MANAGEMENT | Facility: CLINIC | Age: 28
End: 2023-12-27

## 2024-01-04 VITALS
TEMPERATURE: 98 F | DIASTOLIC BLOOD PRESSURE: 78 MMHG | RESPIRATION RATE: 18 BRPM | OXYGEN SATURATION: 99 % | HEART RATE: 90 BPM | SYSTOLIC BLOOD PRESSURE: 115 MMHG

## 2024-01-04 LAB
GLUCOSE BLDC GLUCOMTR-MCNC: 90 MG/DL — SIGNIFICANT CHANGE UP (ref 70–99)
GLUCOSE BLDC GLUCOMTR-MCNC: 90 MG/DL — SIGNIFICANT CHANGE UP (ref 70–99)

## 2024-01-04 PROCEDURE — 90870 ELECTROCONVULSIVE THERAPY: CPT

## 2024-01-04 RX ORDER — MIDAZOLAM HYDROCHLORIDE 1 MG/ML
2 INJECTION INTRAMUSCULAR; INTRAVENOUS ONCE
Refills: 0 | Status: DISCONTINUED | OUTPATIENT
Start: 2024-01-04 | End: 2024-01-04

## 2024-01-07 NOTE — PHYSICAL EXAM
"Papito Santoro (77 y.o. Male)       Date of Birth   1946    Social Security Number       Address   [address] [place] [address]    Home Phone   [phone]    MRN   [id number]       [facility]    Marital Status                               Admission Date   1/7/24    Admission Type   Emergency    Admitting Provider   Salima Rogers MD    Attending Provider   Aditya Oswald MD    Department, Room/Bed   [facility] EMERGENCY DEPARTMENT, 08/08       Discharge Date       Discharge Disposition       Discharge Destination                                 Attending Provider: Aditya Oswald MD    Allergies: Atorvastatin, Penicillins    Isolation: None   Infection: COVID (rule out) (01/07/24)   Code Status: Prior    Ht: 172.7 cm (68\")   Wt: 74.4 kg (164 lb)    Admission Cmt: None   Principal Problem: Orthostatic dizziness [R42]                   Active Insurance as of 1/7/2024       Primary Coverage       Payor Plan Insurance Group Employer/Plan Group    MEDICARE MEDICARE A & B        Payor Plan Address Payor Plan Phone Number Payor Plan Fax Number Effective Dates    [address] [phone]  12/1/2009 - None Entered    [facility] [place] [id number]         Subscriber Name Subscriber Birth Date Member ID       PAPITO SANTORO 1946 [id number]               Secondary Coverage       Payor Plan Insurance Group Employer/Plan Group    [employer] [id number]       Payor Plan Address Payor Plan Phone Number Payor Plan Fax Number Effective Dates    [address] [phone]  12/1/2009 - None Entered    [facility] [address]         Subscriber Name Subscriber Birth Date Member ID       PAPITO SANTORO 1946 [id number]                     Emergency Contacts        (Rel.) Home Phone Work Phone Mobile Phone    WestlandBrooke martin ([employer]) (Spouse) [phone] -- [phone]    Priyanka Barnett (Daughter) -- -- [phone]      " [None] : none [Cooperative] : cooperative [Full] : full [Clear] : clear [Linear/Goal Directed] : linear/goal directed [None Reported] : none reported [WNL] : within normal limits [Average] : average [FreeTextEntry1] : colorful hair, casually dressed and groomed [FreeTextEntry8] : "okay" [de-identified] : good [de-identified] : fair

## 2024-01-08 DIAGNOSIS — F33.2 MAJOR DEPRESSIVE DISORDER, RECURRENT SEVERE WITHOUT PSYCHOTIC FEATURES: ICD-10-CM

## 2024-01-09 LAB
GLUCOSE BLDC GLUCOMTR-MCNC: 108 MG/DL — HIGH (ref 70–99)
GLUCOSE BLDC GLUCOMTR-MCNC: 108 MG/DL — HIGH (ref 70–99)

## 2024-01-09 PROCEDURE — 90870 ELECTROCONVULSIVE THERAPY: CPT

## 2024-01-11 LAB
GLUCOSE BLDC GLUCOMTR-MCNC: 95 MG/DL — SIGNIFICANT CHANGE UP (ref 70–99)
GLUCOSE BLDC GLUCOMTR-MCNC: 95 MG/DL — SIGNIFICANT CHANGE UP (ref 70–99)

## 2024-01-11 PROCEDURE — 90870 ELECTROCONVULSIVE THERAPY: CPT

## 2024-01-11 RX ORDER — MIDAZOLAM HYDROCHLORIDE 1 MG/ML
2 INJECTION INTRAMUSCULAR; INTRAVENOUS ONCE
Refills: 0 | Status: DISCONTINUED | OUTPATIENT
Start: 2024-01-11 | End: 2024-01-11

## 2024-01-11 RX ADMIN — MIDAZOLAM HYDROCHLORIDE 2 MILLIGRAM(S): 1 INJECTION INTRAMUSCULAR; INTRAVENOUS at 16:24

## 2024-01-17 ENCOUNTER — APPOINTMENT (OUTPATIENT)
Dept: PAIN MANAGEMENT | Facility: CLINIC | Age: 29
End: 2024-01-17

## 2024-01-19 ENCOUNTER — APPOINTMENT (OUTPATIENT)
Dept: PSYCHIATRY | Facility: CLINIC | Age: 29
End: 2024-01-19
Payer: COMMERCIAL

## 2024-01-19 ENCOUNTER — OUTPATIENT (OUTPATIENT)
Dept: OUTPATIENT SERVICES | Facility: HOSPITAL | Age: 29
LOS: 1 days | End: 2024-01-19
Payer: COMMERCIAL

## 2024-01-19 DIAGNOSIS — F32.A DEPRESSION, UNSPECIFIED: ICD-10-CM

## 2024-01-19 DIAGNOSIS — F60.3 BORDERLINE PERSONALITY DISORDER: ICD-10-CM

## 2024-01-19 PROCEDURE — ZZZZZ: CPT

## 2024-01-19 PROCEDURE — 99213 OFFICE O/P EST LOW 20 MIN: CPT | Mod: 95

## 2024-01-19 NOTE — PHYSICAL EXAM
[Cooperative] : cooperative [Euthymic] : euthymic [Full] : full [Clear] : clear [Linear/Goal Directed] : linear/goal directed [None Reported] : none reported [WNL] : within normal limits [Average] : average [FreeTextEntry1] : overweight [FreeTextEntry8] : 'I am depressed" [de-identified] : fair [de-identified] : fair
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
## 2024-01-19 NOTE — PLAN
[Medication education provided] : Medication education provided. [Rationale for medication choices, possible risks/precautions, benefits, alternative treatment choices, and consequences of non-treatment discussed] : Rationale for medication choices, possible risks/precautions, benefits, alternative treatment choices, and consequences of non-treatment discussed with patient/family/caregiver  [FreeTextEntry4] : To keep bpd, depression, ptsd and anxiety under control [FreeTextEntry5] : - Patient is actively receiving ECT at [facility] - Continue Effexor XR to 150mg + 37.5mg po daily to target anxiety and depression - Continue Quetiapine extended release 400mg PO QHS - Continue Gabapentin 600 mg po BID prn for anxiety symptoms - Consider therapy referral (e.g., DBT) - Follow up in about 1 month

## 2024-01-19 NOTE — SOCIAL HISTORY
[FreeTextEntry1] : 2 previous engagements, in same-sex relationship since 10/2018. Currently unemployed, Former [employer] ; Studying for bachelors in psychology and minor in women and gender studies, to graduate 2023. Mom in [place], Dad in [place]. Has 7 siblings, raised 5 siblings. Has friends. currently working as pharmacy tech. Domiciled with grandmother in [facility]. \par  \par  Description: medicinal marijuana for PTSD; 0.5 cartridge/month with daily use

## 2024-01-19 NOTE — DISCUSSION/SUMMARY
[FreeTextEntry1] : Patient is a 27 y/o F (identifies as "Julio," non-binary, they/them pronouns), recently single, domiciled with grandmother, with PMH of heart murmur with atrial septal defect (benign), PPH of borderline personality disorder, PTSD, obsessive compulsive sx, with 2 prior IPP admissions and 1 prior suicide attempt at age 15 via pill ingestion, current medical marijuana use for PTSD, presenting for follow up examination.  Risk assessment: Risk factors include history of suicide attempt, personality disorder diagnosis, PTSD symptoms, depressive symptoms, SIB and recent trigger; protective factors include no acute suicidality, future oriented towards completing coursework, social support from family/friends, engaged and compliant with treatment and help seeking behavior. Patient is therefore low to mod acute suicide risk despite her chronically elevated risk.  On previous psychiatric evaluation before receiving first dose of ECT on 1/2/2024, patient endorses feeling depressed but presents with euthymic mood. There is marked incongruence between her stated mood and presenting affect; high suspicion that there may be a strong component of borderline personality disorder and autism spectrum disorder potentially affecting her mood. At his time. At this time, patient is not acutely suicidal, manic, psychotic, paranoid, or delusional. Endorses ongoing anxiety and interest in ECT. Remains stable for outpatient level of care.  On today's (1/19) psychiatric evaluation, patient has received three ECT doses and reports improvement in mood, suicidal ideation, anxiety, and energy levels. Denies any active side-effects from ECT.

## 2024-01-19 NOTE — REASON FOR VISIT
[Patient preference] : as per patient preference [Telehealth (audio & video) - Individual/Group] : This visit was provided via telehealth using real-time 2-way audio visual technology. [Medical Office: ([facility])___] : The provider was located at the medical office in [unfilled]. [Home] : The patient, [unfilled], was located at home, [unfilled], at the time of the visit. [Patient] : Patient [FreeTextEntry4] : 3:10PM [FreeTextEntry5] : 3:30PM [FreeTextEntry1] : Medication management, follow-up/"I'm doing good."

## 2024-01-20 DIAGNOSIS — F60.3 BORDERLINE PERSONALITY DISORDER: ICD-10-CM

## 2024-01-20 DIAGNOSIS — F32.A DEPRESSION, UNSPECIFIED: ICD-10-CM

## 2024-01-22 LAB — GLUCOSE BLDC GLUCOMTR-MCNC: 106 MG/DL — HIGH (ref 70–99)

## 2024-01-22 PROCEDURE — 90870 ELECTROCONVULSIVE THERAPY: CPT

## 2024-01-22 RX ORDER — MIDAZOLAM HYDROCHLORIDE 1 MG/ML
2 INJECTION INTRAMUSCULAR; INTRAVENOUS ONCE
Refills: 0 | Status: DISCONTINUED | OUTPATIENT
Start: 2024-01-22 | End: 2024-07-16

## 2024-01-26 ENCOUNTER — APPOINTMENT (OUTPATIENT)
Dept: PAIN MANAGEMENT | Facility: CLINIC | Age: 29
End: 2024-01-26
Payer: MEDICAID

## 2024-01-26 DIAGNOSIS — Z86.39 PERSONAL HISTORY OF OTHER ENDOCRINE, NUTRITIONAL AND METABOLIC DISEASE: ICD-10-CM

## 2024-01-26 DIAGNOSIS — M54.12 RADICULOPATHY, CERVICAL REGION: ICD-10-CM

## 2024-01-26 DIAGNOSIS — M54.16 RADICULOPATHY, LUMBAR REGION: ICD-10-CM

## 2024-01-26 PROCEDURE — G2211 COMPLEX E/M VISIT ADD ON: CPT

## 2024-01-26 PROCEDURE — 99213 OFFICE O/P EST LOW 20 MIN: CPT

## 2024-01-26 RX ORDER — SITAGLIPTIN 100 MG/1
100 TABLET, FILM COATED ORAL
Refills: 0 | Status: ACTIVE | COMMUNITY

## 2024-01-26 NOTE — PHYSICAL EXAM
[de-identified] : NECK - tenderness over the cervical paraspinals. ROM restricted. Pain with flexion. Positive spurling bilaterally.   BACK - tenderness into the lumbar paraspinals.

## 2024-01-26 NOTE — HISTORY OF PRESENT ILLNESS
[FreeTextEntry1] : HISTORY OF PRESENT ILLNESS, ORIGINAL: Ms. Oro is a 26 year old female complaining of lower back and neck pain. In regards to her neck pain, she states this pain happens constantly and is severe. She currently rates 10/10 on the pain scale. She states the pain radiates into her left arm causing numbness and tingling as well as spasms. She states the pain is present daily. She states she has trouble getting out of bed due to the severe pain.  In regard to her lower back pain, she states this pain happens constantly and is severe. She states the pain is mainly across the lower lumbar spine with some radiation to the buttock.  The pain started after no inciting event. The patient has had this pain for 5 years. Patient describes the pain as moderate to severe. During the last month the pain has been nearly constant with symptoms worsening morning, 90. Pain described as shooting, pressure-like, dull/achy. Pain is increased with standing, sitting, walking, bowel movement. Pain is decreased with lying down, sitting, coughing/knees. Pain is not changed with exercising. Bowel or bladder habits have not changed.  PRESENTING TODAY 01-: Today in revisit encounter she notes she is currently undergoing ECT treatments which has been providing her with relief. She has to discontinue the Gabapentin with these treatments. She is not sure what to take to manage her pain on the days she undergoes ECT treatments.  As for her neck pain, she continues with a flareup.  She currently rates pain as a 10 out of 10 on the pain scale.  She states pain is constant in nature and travels into the arms with associated numbness, tingling and spasms.  She also has chief complaints of trouble grasping objects.  As for her lower back pain, she continues to rate the pain as an 8 out of 10 on the pain scale.  She states pain is constant in nature and radiates into the legs with associated numbness, tingling and spasms.  She has significant difficulty walking, sitting or standing secondary to the pain.

## 2024-01-29 ENCOUNTER — NON-APPOINTMENT (OUTPATIENT)
Age: 29
End: 2024-01-29

## 2024-01-30 LAB — GLUCOSE BLDC GLUCOMTR-MCNC: 93 MG/DL — SIGNIFICANT CHANGE UP (ref 70–99)

## 2024-01-30 PROCEDURE — 90870 ELECTROCONVULSIVE THERAPY: CPT

## 2024-01-31 ENCOUNTER — APPOINTMENT (OUTPATIENT)
Dept: PAIN MANAGEMENT | Facility: CLINIC | Age: 29
End: 2024-01-31

## 2024-02-16 ENCOUNTER — APPOINTMENT (OUTPATIENT)
Dept: PSYCHIATRY | Facility: CLINIC | Age: 29
End: 2024-02-16
Payer: COMMERCIAL

## 2024-02-16 ENCOUNTER — OUTPATIENT (OUTPATIENT)
Dept: OUTPATIENT SERVICES | Facility: HOSPITAL | Age: 29
LOS: 1 days | End: 2024-02-16
Payer: COMMERCIAL

## 2024-02-16 DIAGNOSIS — F60.3 BORDERLINE PERSONALITY DISORDER: ICD-10-CM

## 2024-02-16 PROCEDURE — ZZZZZ: CPT

## 2024-02-16 PROCEDURE — 99214 OFFICE O/P EST MOD 30 MIN: CPT | Mod: 95

## 2024-02-16 NOTE — PHYSICAL EXAM
[Cooperative] : cooperative [Euthymic] : euthymic [Full] : full [Clear] : clear [Linear/Goal Directed] : linear/goal directed [None Reported] : none reported [WNL] : within normal limits [Average] : average [FreeTextEntry1] : overweight [FreeTextEntry8] : 'I am depressed" [de-identified] : fair [de-identified] : fair

## 2024-02-16 NOTE — REASON FOR VISIT
[Patient preference] : as per patient preference [Telehealth (audio & video) - Individual/Group] : This visit was provided via telehealth using real-time 2-way audio visual technology. [Medical Office: ([facility])___] : The provider was located at the medical office in [unfilled]. [Home] : The patient, [unfilled], was located at home, [unfilled], at the time of the visit. [Patient] : Patient [FreeTextEntry4] : 3:00PM [FreeTextEntry5] : 3:30PM [FreeTextEntry1] : Medication management, follow-up
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
## 2024-02-16 NOTE — PLAN
[Medication education provided] : Medication education provided. [Rationale for medication choices, possible risks/precautions, benefits, alternative treatment choices, and consequences of non-treatment discussed] : Rationale for medication choices, possible risks/precautions, benefits, alternative treatment choices, and consequences of non-treatment discussed with patient/family/caregiver  [FreeTextEntry4] : To keep bpd, depression, ptsd and anxiety under control [FreeTextEntry5] : - Patient is actively receiving ECT at [facility] - Continue Effexor XR to 150mg + 37.5mg po daily to target anxiety and depression - Continue Quetiapine extended release 400mg PO QHS - Continue Gabapentin 600 mg po BID prn for anxiety symptoms - Consider therapy referral (e.g., DBT) - Follow up in about 1 month

## 2024-02-16 NOTE — DISCUSSION/SUMMARY
[FreeTextEntry1] : Patient is a 27 y/o F (identifies as "Julio," non-binary, they/them pronouns), recently single, domiciled with grandmother, with PMH of heart murmur with atrial septal defect (benign), PPH of borderline personality disorder, PTSD, obsessive compulsive sx, with 2 prior IPP admissions and 1 prior suicide attempt at age 15 via pill ingestion, current medical marijuana use for PTSD, presenting for follow up examination.  Risk assessment: Risk factors include history of suicide attempt, personality disorder diagnosis, PTSD symptoms, depressive symptoms, SIB and recent trigger; protective factors include no acute suicidality, future oriented towards completing coursework, social support from family/friends, engaged and compliant with treatment and help seeking behavior. Patient is therefore low to mod acute suicide risk despite her chronically elevated risk.  On previous psychiatric evaluation before receiving first dose of ECT on 1/2/2024, patient endorses feeling depressed but presents with euthymic mood. There is marked incongruence between her stated mood and presenting affect; high suspicion that there may be a strong component of borderline personality disorder and autism spectrum disorder potentially affecting her mood. At his time. At this time, patient is not acutely suicidal, manic, psychotic, paranoid, or delusional. Endorses ongoing anxiety and interest in ECT. Remains stable for outpatient level of care.  On today's (2/16) psychiatric evaluation, patient has received five ECT doses and reports improvement in mood, suicidal ideation, anxiety, and energy levels. Denies any active side-effects from ECT.

## 2024-02-17 DIAGNOSIS — F60.3 BORDERLINE PERSONALITY DISORDER: ICD-10-CM

## 2024-03-14 PROCEDURE — 90870 ELECTROCONVULSIVE THERAPY: CPT

## 2024-03-15 ENCOUNTER — APPOINTMENT (OUTPATIENT)
Dept: PSYCHIATRY | Facility: CLINIC | Age: 29
End: 2024-03-15
Payer: COMMERCIAL

## 2024-03-15 ENCOUNTER — OUTPATIENT (OUTPATIENT)
Dept: OUTPATIENT SERVICES | Facility: HOSPITAL | Age: 29
LOS: 1 days | End: 2024-03-15
Payer: COMMERCIAL

## 2024-03-15 DIAGNOSIS — F60.3 BORDERLINE PERSONALITY DISORDER: ICD-10-CM

## 2024-03-15 PROCEDURE — 99213 OFFICE O/P EST LOW 20 MIN: CPT | Mod: 95

## 2024-03-15 PROCEDURE — ZZZZZ: CPT

## 2024-03-16 DIAGNOSIS — F60.3 BORDERLINE PERSONALITY DISORDER: ICD-10-CM

## 2024-03-16 NOTE — DISCUSSION/SUMMARY
[FreeTextEntry1] : Patient is a 27 y/o F (identifies as "Julio," non-binary, they/them pronouns), recently single, domiciled with grandmother, with PMH of heart murmur with atrial septal defect (benign), PPH of borderline personality disorder, PTSD, obsessive compulsive sx, with 2 prior IPP admissions and 1 prior suicide attempt at age 15 via pill ingestion, current medical marijuana use for PTSD, presenting for follow up examination.  Risk assessment: Risk factors include history of suicide attempt, personality disorder diagnosis, PTSD symptoms, depressive symptoms, SIB and recent trigger; protective factors include no acute suicidality, future oriented towards completing coursework, social support from family/friends, engaged and compliant with treatment and help seeking behavior. Patient is therefore low to mod acute suicide risk despite her chronically elevated risk.  On previous psychiatric evaluation before receiving first dose of ECT on 1/2/2024, patient endorses feeling depressed but presents with euthymic mood. There is marked incongruence between her stated mood and presenting affect; high suspicion that there may be a strong component of borderline personality disorder and autism spectrum disorder potentially affecting her mood. At his time. At this time, patient is not acutely suicidal, manic, psychotic, paranoid, or delusional. Endorses ongoing anxiety and interest in ECT. Remains stable for outpatient level of care.  On today's (3/15) psychiatric evaluation, patient has received six ECT doses and reports improvement in mood, suicidal ideation, anxiety, and energy levels. She is tolerating ECT well.

## 2024-03-16 NOTE — REASON FOR VISIT
[Patient preference] : as per patient preference [Telehealth (audio & video) - Individual/Group] : This visit was provided via telehealth using real-time 2-way audio visual technology. [Home] : The patient, [unfilled], was located at home, [unfilled], at the time of the visit. [Medical Office: ([facility])___] : The provider was located at the medical office in [unfilled]. [Patient] : Patient [FreeTextEntry4] : 3:30PM [FreeTextEntry1] : Medication management, follow-up [FreeTextEntry5] : 3:55PM
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
## 2024-03-16 NOTE — PLAN
[Medication education provided] : Medication education provided. [Rationale for medication choices, possible risks/precautions, benefits, alternative treatment choices, and consequences of non-treatment discussed] : Rationale for medication choices, possible risks/precautions, benefits, alternative treatment choices, and consequences of non-treatment discussed with patient/family/caregiver  [FreeTextEntry4] : To keep bpd, depression, ptsd and anxiety under control [FreeTextEntry5] : - Patient is actively receiving ECT at [facility] - Continue Effexor XR to 150mg + 37.5mg po daily to target anxiety and depression - Continue Quetiapine extended release 400mg PO QHS - Continue Gabapentin 600 mg po BID prn for anxiety symptoms - Start PRN Vistaril 25 mg BID for anxiety when patient cannot take Gabapentin due to ECT treatments - Follow up in about 1 month

## 2024-03-16 NOTE — SOCIAL HISTORY
[FreeTextEntry1] : 2 previous engagements, in same-sex relationship since 10/2018. Currently unemployed, Former [employer] ; Studying for bachelors in psychology and minor in women and gender studies, to graduate 2023. Mom in [place], Dad in [place]. Has 7 siblings, raised 5 siblings. Has friends. currently working as pharmacy tech. Domiciled with grandmother in [facility]. \par  \par  Description: medicinal marijuana for PTSD; 0.5 cartridge/month with daily use

## 2024-03-16 NOTE — PHYSICAL EXAM
[Cooperative] : cooperative [Euthymic] : euthymic [Full] : full [Clear] : clear [Linear/Goal Directed] : linear/goal directed [None Reported] : none reported [WNL] : within normal limits [Average] : average [FreeTextEntry1] : overweight [FreeTextEntry8] : 'I am depressed" [de-identified] : fair [de-identified] : fair

## 2024-04-02 PROCEDURE — 90870 ELECTROCONVULSIVE THERAPY: CPT

## 2024-04-04 VITALS
OXYGEN SATURATION: 90 % | DIASTOLIC BLOOD PRESSURE: 75 MMHG | HEART RATE: 90 BPM | TEMPERATURE: 98 F | SYSTOLIC BLOOD PRESSURE: 137 MMHG | RESPIRATION RATE: 17 BRPM

## 2024-04-04 PROCEDURE — 90870 ELECTROCONVULSIVE THERAPY: CPT

## 2024-04-12 ENCOUNTER — APPOINTMENT (OUTPATIENT)
Dept: PSYCHIATRY | Facility: CLINIC | Age: 29
End: 2024-04-12
Payer: COMMERCIAL

## 2024-04-12 ENCOUNTER — OUTPATIENT (OUTPATIENT)
Dept: OUTPATIENT SERVICES | Facility: HOSPITAL | Age: 29
LOS: 1 days | End: 2024-04-12
Payer: COMMERCIAL

## 2024-04-12 DIAGNOSIS — F32.A DEPRESSION, UNSPECIFIED: ICD-10-CM

## 2024-04-12 DIAGNOSIS — F41.9 ANXIETY DISORDER, UNSPECIFIED: ICD-10-CM

## 2024-04-12 PROCEDURE — ZZZZZ: CPT

## 2024-04-12 PROCEDURE — 99212 OFFICE O/P EST SF 10 MIN: CPT | Mod: 95

## 2024-04-12 RX ORDER — HYDROXYZINE PAMOATE 25 MG/1
25 CAPSULE ORAL TWICE DAILY
Qty: 60 | Refills: 0 | Status: ACTIVE | COMMUNITY
Start: 2024-03-15 | End: 1900-01-01

## 2024-04-12 NOTE — SOCIAL HISTORY
[FreeTextEntry1] : 2 previous engagements, was in same-sex relationship since 10/2018. Currently unemployed, Former [employer] ; Studying for bachelors in psychology and minor in women and gender studies. Mom in [place], Dad in [place]. Has 7 siblings, raised 5 siblings. Has friends. Worked as pharmacy tech. Domiciled with grandmother in [facility].   Description: medicinal marijuana for PTSD; 0.5 cartridge/month with daily use

## 2024-04-12 NOTE — REASON FOR VISIT
[Patient preference] : as per patient preference [Telehealth (audio & video) - Individual/Group] : This visit was provided via telehealth using real-time 2-way audio visual technology. [Medical Office: ([facility])___] : The provider was located at the medical office in [unfilled]. [Home] : The patient, [unfilled], was located at home, [unfilled], at the time of the visit. [Patient] : Patient [FreeTextEntry4] : 1:57PM [FreeTextEntry5] : 2:12PM [FreeTextEntry1] : Medication management, follow-up

## 2024-04-12 NOTE — PLAN
[FreeTextEntry4] : To keep bpd, depression, ptsd and anxiety under control [FreeTextEntry5] : - Patient is actively receiving ECT at [facility] - Continue Effexor XR to 150mg + 37.5mg po daily to target anxiety and depression - Continue Quetiapine extended release 400mg PO QHS - Continue Gabapentin 600 mg po BID prn for anxiety symptoms - Continue PRN Vistaril 25 mg BID for anxiety when patient cannot take Gabapentin due to ECT treatments - Follow up in 1 month - Transition of care discussed

## 2024-04-12 NOTE — DISCUSSION/SUMMARY
[FreeTextEntry1] : Patient is a 29 y/o F (identifies as "Julio," non-binary, they/them pronouns), recently single, domiciled with grandmother, with PMH of heart murmur with atrial septal defect (benign), PPH of borderline personality disorder, PTSD, obsessive compulsive sx, with 2 prior IPP admissions and 1 prior suicide attempt at age 15 via pill ingestion, current medical marijuana use for PTSD, presenting for follow up examination.  Risk assessment: Risk factors include history of suicide attempt, personality disorder diagnosis, PTSD symptoms, depressive symptoms, SIB and recent trigger; protective factors include no acute suicidality, future oriented towards completing coursework, social support from family/friends, engaged and compliant with treatment and help seeking behavior. Patient is therefore low to mod acute suicide risk despite her chronically elevated risk.  On previous psychiatric evaluation before receiving first dose of ECT on 1/2/2024, patient endorses feeling depressed but presents with euthymic mood. There is marked incongruence between her stated mood and presenting affect; high suspicion that there may be a strong component of borderline personality disorder and autism spectrum disorder potentially affecting her mood. At this time, patient is not acutely suicidal, manic, psychotic, paranoid, or delusional. Endorses ongoing anxiety and interest in ECT. Remains stable for outpatient level of care.  On today's (4/12) psychiatric evaluation, patient has received eight ECT doses and reports improvement in mood, suicidal ideation, anxiety, and energy levels. She is tolerating ECT well.

## 2024-04-12 NOTE — PHYSICAL EXAM
[Cooperative] : cooperative [Euthymic] : euthymic [Full] : full [Clear] : clear [Linear/Goal Directed] : linear/goal directed [None Reported] : none reported [WNL] : within normal limits [Average] : average [FreeTextEntry1] : overweight [de-identified] : fair [de-identified] : fair
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
## 2024-04-13 DIAGNOSIS — F32.A DEPRESSION, UNSPECIFIED: ICD-10-CM

## 2024-04-13 DIAGNOSIS — F41.9 ANXIETY DISORDER, UNSPECIFIED: ICD-10-CM

## 2024-05-10 ENCOUNTER — OUTPATIENT (OUTPATIENT)
Dept: OUTPATIENT SERVICES | Facility: HOSPITAL | Age: 29
LOS: 1 days | End: 2024-05-10
Payer: COMMERCIAL

## 2024-05-10 ENCOUNTER — APPOINTMENT (OUTPATIENT)
Dept: PSYCHIATRY | Facility: CLINIC | Age: 29
End: 2024-05-10
Payer: COMMERCIAL

## 2024-05-10 DIAGNOSIS — F60.3 BORDERLINE PERSONALITY DISORDER: ICD-10-CM

## 2024-05-10 DIAGNOSIS — F32.A DEPRESSION, UNSPECIFIED: ICD-10-CM

## 2024-05-10 DIAGNOSIS — F41.9 ANXIETY DISORDER, UNSPECIFIED: ICD-10-CM

## 2024-05-10 PROCEDURE — ZZZZZ: CPT

## 2024-05-10 PROCEDURE — 99213 OFFICE O/P EST LOW 20 MIN: CPT | Mod: 95

## 2024-05-10 NOTE — DISCUSSION/SUMMARY
[FreeTextEntry1] : Patient is a 29 y/o F (identifies as "Julio," non-binary, they/them pronouns), recently single, domiciled with grandmother, with PMH of heart murmur with atrial septal defect (benign), PPH of borderline personality disorder, PTSD, obsessive compulsive sx, with 2 prior IPP admissions and 1 prior suicide attempt at age 15 via pill ingestion, current medical marijuana use for PTSD, presenting for follow up examination.  Risk assessment: Risk factors include history of suicide attempt, personality disorder diagnosis, PTSD symptoms, depressive symptoms, SIB and recent trigger; protective factors include no acute suicidality, future oriented towards completing coursework, social support from family/friends, engaged and compliant with treatment and help seeking behavior. Patient is therefore low to mod acute suicide risk despite her chronically elevated risk.  On previous psychiatric evaluation before receiving first dose of ECT on 1/2/2024, patient endorses feeling depressed but presents with euthymic mood. There is marked incongruence between her stated mood and presenting affect; high suspicion that there may be a strong component of borderline personality disorder and autism spectrum disorder potentially affecting her mood. At this time, patient is not acutely suicidal, manic, psychotic, paranoid, or delusional. Endorses ongoing anxiety and interest in ECT. Remains stable for outpatient level of care.  On today's (5/10) psychiatric evaluation, patient has received eight ECT doses and while initially reporting improvement in mood, suicidal ideation, and energy levels, now has worsening mood since she has not received ECT in over 1 month. She will obtain cardiologist clearance to return to ECT.

## 2024-05-10 NOTE — PHYSICAL EXAM
[Cooperative] : cooperative [Euthymic] : euthymic [Full] : full [Clear] : clear [Linear/Goal Directed] : linear/goal directed [None Reported] : none reported [WNL] : within normal limits [Average] : average [FreeTextEntry1] : overweight [de-identified] : fair [de-identified] : fair

## 2024-05-10 NOTE — REASON FOR VISIT
[Patient preference] : as per patient preference [Telehealth (audio & video) - Individual/Group] : This visit was provided via telehealth using real-time 2-way audio visual technology. [Medical Office: ([facility])___] : The provider was located at the medical office in [unfilled]. [Home] : The patient, [unfilled], was located at home, [unfilled], at the time of the visit. [Patient] : Patient [FreeTextEntry4] : 1:57PM [FreeTextEntry5] : 2:12PM [FreeTextEntry1] : Medication management, follow-up

## 2024-05-10 NOTE — PLAN
[FreeTextEntry4] : To keep bpd, depression, ptsd and anxiety under control [FreeTextEntry5] : - Patient was receiving ECT at [facility] - Needs cardiologist clearance before returning. Patient has appointment to see Dr. Taylor Hinojosa next week. - Continue Effexor XR to 150mg + 37.5mg po daily to target anxiety and depression - Continue Quetiapine extended release 400mg PO QHS - Continue Gabapentin 600 mg po BID prn for anxiety symptoms - Continue PRN Vistaril 25 mg BID for anxiety when patient cannot take Gabapentin due to ECT treatments - Not taking for now due to ECT being on hold. - Follow up in 1 month - Transition of care after June previously discussed

## 2024-05-10 NOTE — SOCIAL HISTORY
[FreeTextEntry1] : 2 previous engagements, was in same-sex relationship since 10/2018. Currently unemployed, Former [employer] ; Studying for bachelors in psychology and minor in women and gender studies. Mom in [place], Dad in [place]. Has 7 siblings, raised 5 siblings. Has friends. Worked as pharmacy tech. Domiciled with grandmother in [facility].   Description: medicinal marijuana for PTSD; 0.5 cartridge/month with daily use
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
## 2024-05-11 DIAGNOSIS — F41.9 ANXIETY DISORDER, UNSPECIFIED: ICD-10-CM

## 2024-05-11 DIAGNOSIS — F60.3 BORDERLINE PERSONALITY DISORDER: ICD-10-CM

## 2024-05-14 NOTE — ED ADULT TRIAGE NOTE - RESPIRATORY RATE (BREATHS/MIN)
From: Larry Ewing  To: Kee Earl  Sent: 5/14/2024 8:20 AM CDT  Subject: Podiatrist referral    Hi,    I saw a PA, Dr. Frey, at the [place] walk[facility]in [facility] over a week ago. He said he would put in a referral to a podiatrist for my foot issue and that someone would call. No one has called and Dr Frey is not listed as a person I can send a message to via [employer].    Could you review his notes and make a referral for me or give me a name I can call to make an appointment with?    Thanks. Larry   16

## 2024-05-19 ENCOUNTER — NON-APPOINTMENT (OUTPATIENT)
Age: 29
End: 2024-05-19

## 2024-05-30 ENCOUNTER — NON-APPOINTMENT (OUTPATIENT)
Age: 29
End: 2024-05-30

## 2024-06-05 ENCOUNTER — EMERGENCY (EMERGENCY)
Facility: HOSPITAL | Age: 29
LOS: 0 days | Discharge: HOME | End: 2024-06-06
Attending: EMERGENCY MEDICINE
Payer: MEDICAID

## 2024-06-05 VITALS
OXYGEN SATURATION: 100 % | SYSTOLIC BLOOD PRESSURE: 122 MMHG | HEART RATE: 83 BPM | DIASTOLIC BLOOD PRESSURE: 77 MMHG | WEIGHT: 139.99 LBS | TEMPERATURE: 98 F | RESPIRATION RATE: 19 BRPM

## 2024-06-05 DIAGNOSIS — Z88.0 ALLERGY STATUS TO PENICILLIN: ICD-10-CM

## 2024-06-05 DIAGNOSIS — R20.2 PARESTHESIA OF SKIN: ICD-10-CM

## 2024-06-05 DIAGNOSIS — L60.0 INGROWING NAIL: ICD-10-CM

## 2024-06-05 DIAGNOSIS — Z88.8 ALLERGY STATUS TO OTHER DRUGS, MEDICAMENTS AND BIOLOGICAL SUBSTANCES: ICD-10-CM

## 2024-06-05 DIAGNOSIS — E03.9 HYPOTHYROIDISM, UNSPECIFIED: ICD-10-CM

## 2024-06-05 DIAGNOSIS — Z91.018 ALLERGY TO OTHER FOODS: ICD-10-CM

## 2024-06-05 DIAGNOSIS — M54.2 CERVICALGIA: ICD-10-CM

## 2024-06-05 DIAGNOSIS — Z59.00 HOMELESSNESS UNSPECIFIED: ICD-10-CM

## 2024-06-05 DIAGNOSIS — L08.89 OTHER SPECIFIED LOCAL INFECTIONS OF THE SKIN AND SUBCUTANEOUS TISSUE: ICD-10-CM

## 2024-06-05 DIAGNOSIS — K58.9 IRRITABLE BOWEL SYNDROME WITHOUT DIARRHEA: ICD-10-CM

## 2024-06-05 DIAGNOSIS — Z91.048 OTHER NONMEDICINAL SUBSTANCE ALLERGY STATUS: ICD-10-CM

## 2024-06-05 DIAGNOSIS — F31.9 BIPOLAR DISORDER, UNSPECIFIED: ICD-10-CM

## 2024-06-05 DIAGNOSIS — G90.A POSTURAL ORTHOSTATIC TACHYCARDIA SYNDROME [POTS]: ICD-10-CM

## 2024-06-05 DIAGNOSIS — Z88.1 ALLERGY STATUS TO OTHER ANTIBIOTIC AGENTS STATUS: ICD-10-CM

## 2024-06-05 DIAGNOSIS — E11.40 TYPE 2 DIABETES MELLITUS WITH DIABETIC NEUROPATHY, UNSPECIFIED: ICD-10-CM

## 2024-06-05 LAB
ALBUMIN SERPL ELPH-MCNC: 4.1 G/DL — SIGNIFICANT CHANGE UP (ref 3.5–5.2)
ALP SERPL-CCNC: 99 U/L — SIGNIFICANT CHANGE UP (ref 30–115)
ALT FLD-CCNC: 15 U/L — SIGNIFICANT CHANGE UP (ref 0–41)
ANION GAP SERPL CALC-SCNC: 9 MMOL/L — SIGNIFICANT CHANGE UP (ref 7–14)
APPEARANCE UR: CLEAR — SIGNIFICANT CHANGE UP
AST SERPL-CCNC: 15 U/L — SIGNIFICANT CHANGE UP (ref 0–41)
BACTERIA # UR AUTO: ABNORMAL /HPF
BASOPHILS # BLD AUTO: 0.04 K/UL — SIGNIFICANT CHANGE UP (ref 0–0.2)
BASOPHILS NFR BLD AUTO: 0.5 % — SIGNIFICANT CHANGE UP (ref 0–1)
BILIRUB SERPL-MCNC: 0.2 MG/DL — SIGNIFICANT CHANGE UP (ref 0.2–1.2)
BILIRUB UR-MCNC: NEGATIVE — SIGNIFICANT CHANGE UP
BUN SERPL-MCNC: 10 MG/DL — SIGNIFICANT CHANGE UP (ref 10–20)
CALCIUM SERPL-MCNC: 8.9 MG/DL — SIGNIFICANT CHANGE UP (ref 8.4–10.5)
CHLORIDE SERPL-SCNC: 103 MMOL/L — SIGNIFICANT CHANGE UP (ref 98–110)
CO2 SERPL-SCNC: 26 MMOL/L — SIGNIFICANT CHANGE UP (ref 17–32)
COLOR SPEC: YELLOW — SIGNIFICANT CHANGE UP
CREAT SERPL-MCNC: 1 MG/DL — SIGNIFICANT CHANGE UP (ref 0.7–1.5)
DIFF PNL FLD: ABNORMAL
EGFR: 78 ML/MIN/1.73M2 — SIGNIFICANT CHANGE UP
EOSINOPHIL # BLD AUTO: 0.26 K/UL — SIGNIFICANT CHANGE UP (ref 0–0.7)
EOSINOPHIL NFR BLD AUTO: 3 % — SIGNIFICANT CHANGE UP (ref 0–8)
EPI CELLS # UR: PRESENT
GLUCOSE SERPL-MCNC: 102 MG/DL — HIGH (ref 70–99)
GLUCOSE UR QL: NEGATIVE MG/DL — SIGNIFICANT CHANGE UP
HCG SERPL QL: NEGATIVE — SIGNIFICANT CHANGE UP
HCT VFR BLD CALC: 38.7 % — SIGNIFICANT CHANGE UP (ref 37–47)
HGB BLD-MCNC: 12.8 G/DL — SIGNIFICANT CHANGE UP (ref 12–16)
IMM GRANULOCYTES NFR BLD AUTO: 0.5 % — HIGH (ref 0.1–0.3)
KETONES UR-MCNC: ABNORMAL MG/DL
LEUKOCYTE ESTERASE UR-ACNC: ABNORMAL
LYMPHOCYTES # BLD AUTO: 1.51 K/UL — SIGNIFICANT CHANGE UP (ref 1.2–3.4)
LYMPHOCYTES # BLD AUTO: 17.5 % — LOW (ref 20.5–51.1)
MAGNESIUM SERPL-MCNC: 2 MG/DL — SIGNIFICANT CHANGE UP (ref 1.8–2.4)
MCHC RBC-ENTMCNC: 26.6 PG — LOW (ref 27–31)
MCHC RBC-ENTMCNC: 33.1 G/DL — SIGNIFICANT CHANGE UP (ref 32–37)
MCV RBC AUTO: 80.3 FL — LOW (ref 81–99)
MONOCYTES # BLD AUTO: 0.69 K/UL — HIGH (ref 0.1–0.6)
MONOCYTES NFR BLD AUTO: 8 % — SIGNIFICANT CHANGE UP (ref 1.7–9.3)
NEUTROPHILS # BLD AUTO: 6.08 K/UL — SIGNIFICANT CHANGE UP (ref 1.4–6.5)
NEUTROPHILS NFR BLD AUTO: 70.5 % — SIGNIFICANT CHANGE UP (ref 42.2–75.2)
NITRITE UR-MCNC: NEGATIVE — SIGNIFICANT CHANGE UP
NRBC # BLD: 0 /100 WBCS — SIGNIFICANT CHANGE UP (ref 0–0)
PH UR: 6 — SIGNIFICANT CHANGE UP (ref 5–8)
PLATELET # BLD AUTO: 307 K/UL — SIGNIFICANT CHANGE UP (ref 130–400)
PMV BLD: 9.5 FL — SIGNIFICANT CHANGE UP (ref 7.4–10.4)
POTASSIUM SERPL-MCNC: 4.2 MMOL/L — SIGNIFICANT CHANGE UP (ref 3.5–5)
POTASSIUM SERPL-SCNC: 4.2 MMOL/L — SIGNIFICANT CHANGE UP (ref 3.5–5)
PROT SERPL-MCNC: 6.6 G/DL — SIGNIFICANT CHANGE UP (ref 6–8)
PROT UR-MCNC: SIGNIFICANT CHANGE UP MG/DL
RBC # BLD: 4.82 M/UL — SIGNIFICANT CHANGE UP (ref 4.2–5.4)
RBC # FLD: 13.2 % — SIGNIFICANT CHANGE UP (ref 11.5–14.5)
RBC CASTS # UR COMP ASSIST: 8 /HPF — HIGH (ref 0–4)
SODIUM SERPL-SCNC: 138 MMOL/L — SIGNIFICANT CHANGE UP (ref 135–146)
SP GR SPEC: 1.02 — SIGNIFICANT CHANGE UP (ref 1–1.03)
SQUAMOUS # UR AUTO: 4 /HPF — SIGNIFICANT CHANGE UP (ref 0–5)
UROBILINOGEN FLD QL: 1 MG/DL — SIGNIFICANT CHANGE UP (ref 0.2–1)
WBC # BLD: 8.62 K/UL — SIGNIFICANT CHANGE UP (ref 4.8–10.8)
WBC # FLD AUTO: 8.62 K/UL — SIGNIFICANT CHANGE UP (ref 4.8–10.8)
WBC UR QL: 6 /HPF — HIGH (ref 0–5)

## 2024-06-05 PROCEDURE — 99284 EMERGENCY DEPT VISIT MOD MDM: CPT

## 2024-06-05 PROCEDURE — 83735 ASSAY OF MAGNESIUM: CPT

## 2024-06-05 PROCEDURE — 81001 URINALYSIS AUTO W/SCOPE: CPT

## 2024-06-05 PROCEDURE — 80053 COMPREHEN METABOLIC PANEL: CPT

## 2024-06-05 PROCEDURE — 85025 COMPLETE CBC W/AUTO DIFF WBC: CPT

## 2024-06-05 PROCEDURE — 99284 EMERGENCY DEPT VISIT MOD MDM: CPT | Mod: 25

## 2024-06-05 PROCEDURE — 87086 URINE CULTURE/COLONY COUNT: CPT

## 2024-06-05 PROCEDURE — 96374 THER/PROPH/DIAG INJ IV PUSH: CPT

## 2024-06-05 PROCEDURE — 36415 COLL VENOUS BLD VENIPUNCTURE: CPT

## 2024-06-05 PROCEDURE — 84703 CHORIONIC GONADOTROPIN ASSAY: CPT

## 2024-06-05 RX ORDER — METHOCARBAMOL 500 MG/1
1000 TABLET, FILM COATED ORAL ONCE
Refills: 0 | Status: COMPLETED | OUTPATIENT
Start: 2024-06-05 | End: 2024-06-05

## 2024-06-05 RX ORDER — SODIUM CHLORIDE 9 MG/ML
1000 INJECTION, SOLUTION INTRAVENOUS ONCE
Refills: 0 | Status: COMPLETED | OUTPATIENT
Start: 2024-06-05 | End: 2024-06-05

## 2024-06-05 RX ORDER — KETOROLAC TROMETHAMINE 30 MG/ML
15 SYRINGE (ML) INJECTION ONCE
Refills: 0 | Status: DISCONTINUED | OUTPATIENT
Start: 2024-06-05 | End: 2024-06-05

## 2024-06-05 RX ORDER — ACETAMINOPHEN 500 MG
975 TABLET ORAL ONCE
Refills: 0 | Status: COMPLETED | OUTPATIENT
Start: 2024-06-05 | End: 2024-06-05

## 2024-06-05 RX ADMIN — Medication 975 MILLIGRAM(S): at 23:15

## 2024-06-05 RX ADMIN — METHOCARBAMOL 1000 MILLIGRAM(S): 500 TABLET, FILM COATED ORAL at 23:15

## 2024-06-05 RX ADMIN — SODIUM CHLORIDE 1000 MILLILITER(S): 9 INJECTION, SOLUTION INTRAVENOUS at 21:49

## 2024-06-05 RX ADMIN — Medication 15 MILLIGRAM(S): at 21:50

## 2024-06-05 SDOH — ECONOMIC STABILITY - HOUSING INSECURITY: HOMELESSNESS UNSPECIFIED: Z59.00

## 2024-06-05 NOTE — ED ADULT NURSE NOTE - NSFALLASSESSNEED_ED_ALL_ED
I will START or STAY ON the medications listed below when I get home from the hospital:    ibuprofen 600 mg oral tablet  -- 1 tab(s) by mouth every 6 hours, As needed, moderate pain  -- Indication: For Mild to moderate pain    Tylenol 325 mg oral tablet  -- 650 milligram(s) by mouth every 6 hours, As Needed  -- Indication: For Mild to moderate pain    oxyCODONE 5 mg oral tablet  -- 1 tab(s) by mouth every 6 hours, As Needed -for severe pain MDD:4  -- Caution federal law prohibits the transfer of this drug to any person other  than the person for whom it was prescribed.  It is very important that you take or use this exactly as directed.  Do not skip doses or discontinue unless directed by your doctor.  May cause drowsiness.  Alcohol may intensify this effect.  Use care when operating dangerous machinery.  This prescription cannot be refilled.  Using more of this medication than prescribed may cause serious breathing problems.    -- Indication: For Severe pain as needed    multivitamin  -- 1 tab(s) by mouth once a day  -- Indication: For Supplement
no

## 2024-06-06 VITALS — DIASTOLIC BLOOD PRESSURE: 72 MMHG | OXYGEN SATURATION: 99 % | HEART RATE: 72 BPM | SYSTOLIC BLOOD PRESSURE: 118 MMHG

## 2024-06-06 RX ORDER — CEPHALEXIN 500 MG
1 CAPSULE ORAL
Qty: 28 | Refills: 0
Start: 2024-06-06 | End: 2024-06-12

## 2024-06-06 RX ORDER — BACITRACIN AND POLYMYXIN B SULFATE 500; 10000 [USP'U]/G; [USP'U]/G
1 OINTMENT TOPICAL
Qty: 1 | Refills: 0
Start: 2024-06-06 | End: 2024-06-15

## 2024-06-06 NOTE — ED PROVIDER NOTE - NSPTACCESSSVCSAPPTDETAILS_ED_ALL_ED_FT
Pt with R ingrown 1rst toenail, is diabetic with neuropathy, has been seen podiatrist, should follow for routine care.

## 2024-06-06 NOTE — ED PROVIDER NOTE - CLINICAL SUMMARY MEDICAL DECISION MAKING FREE TEXT BOX
Patient's pain was treated.  Will start oral antibiotics for toe infection, will refer patient for outpatient follow-up. Patient's pain was treated.  Labs were obtained.  Pt with nml WBC count, nml electrolytes. Will start oral antibiotics for toe infection, will refer patient for outpatient follow-up.

## 2024-06-06 NOTE — ED PROVIDER NOTE - NSFOLLOWUPINSTRUCTIONS_ED_ALL_ED_FT
Our Emergency Department Referral Coordinators will be reaching out to you in the next 24-48 hours from 9:00am to 5:00pm to schedule a follow up appointment. Please expect a phone call from the hospital in that time frame. If you do not receive a call or if you have any questions or concerns, you can reach them at   [phone].    Neuropathy    Peripheral neuropathy is a type of nerve damage. It affects nerves that carry signals between the spinal cord and other parts of the body. Many things can damage peripheral nerves including diabetes. Signs and symptoms of neuropathy include numbness, tingling, pain, sensitive skin, weakness/paralysis of a body part, muscle twitching, loss of balance, not being able to control your bladder or sexual problems. If you have numbness in your feet check every day for signs of infection including redness, warmth and swelling.      SEEK IMMEDIATE MEDICAL CARE IF YOU HAVE THE FOLLOWING SYMPTOMS: an injury or infection that is not healing, dizziness, vomiting, chest pain, trouble breathing.

## 2024-06-06 NOTE — ED PROVIDER NOTE - PATIENT PORTAL LINK FT
You can access the FollowMyHealth Patient Portal offered by [facility] by registering at the following website: [email or web address]. By joining [facility]’s FollowMyHealth portal, you will also be able to view your health information using other applications (apps) compatible with our system.

## 2024-06-06 NOTE — ED PROVIDER NOTE - PHYSICAL EXAMINATION
VITAL SIGNS: I have reviewed nursing notes and confirm.  CONSTITUTIONAL: Well-developed; well-nourished; in no acute distress.  SKIN: Skin exam is warm and dry, no acute rash.  HEAD: Normocephalic; atraumatic.  EYES: PERRL, EOM intact; conjunctiva and sclera clear.  ENT: No nasal discharge; airway clear. Oropharynx clear. Uvula midline.   NECK: Supple; non tender.  CARD: S1, S2 normal; no murmurs, gallops, or rubs. Regular rate and rhythm.  RESP: No wheezes, rales or rhonchi. Speaking in full sentences.   ABD: Normal bowel sounds; soft; non-distended; non-tender; No rebound or guarding. No CVA tenderness.  EXT: Normal ROM. No clubbing, cyanosis or edema. (+) ingrown R 1rst toenail, mild TTP. No swelling/erythema. DP 2+.   NEURO: Alert, oriented. Grossly unremarkable. No focal deficits.   PSYCH: Cooperative, appropriate.

## 2024-06-06 NOTE — ED PROVIDER NOTE - OBJECTIVE STATEMENT
28-year-old female with past medical history of DM, IBS, POTS, borderline personality disorder, depression, hypothyroidism, former alcohol abuse, neuropathy, and chronic neck and back pain, currently undomiciled, presents to the ED for evaluation of right toe infection, and tingling to bilateral lower extremities that occurs if she lays for too long.  Patient stated she has not seen her doctor recently and wanted to be evaluated for her symptoms.  Patient states she previously was on gabapentin but had stopped taking it.  She denies other complaints. Pt denies fever, chills, nausea, vomiting, abdominal pain, diarrhea, headache, dizziness, weakness, chest pain, SOB, LOC, trauma, urinary symptoms, cough, calf pain/swelling, recent travel, recent surgery.

## 2024-06-06 NOTE — ED PROVIDER NOTE - ATTENDING APP SHARED VISIT CONTRIBUTION OF CARE
28-year-old female past medical history of bipolar, receives ECT therapy history of hypothyroidism, POTS history of chronic back pain diabetes presents for evaluation of tingling sensation in the legs when she lays still for too long.  Patient is also concerned about infection to her big toe, no history of trauma, patient also with urinary frequency, no hematuria, dysuria, no nausea vomiting or diarrhea, no fever or chills, patient states she was evaluated by electro physiologist and her metoprolol was changed.  Patient also states that she takes gabapentin as needed and took it tonight and it did not really help.  Patient is also reporting that she is homeless.  On exam patient is in NAD, AAOx3, nontoxic-appearing, speech is clear and fluent, patient seen ambulate with steady gait, no midline vertebral tenderness, no CVA tenderness, OP clear, MMM, PERRL, EOMI, no drift, good tone and equal strength, abdomen soft nontender nondistended, + ingrown toenail rt great toe, n welling or erythma

## 2024-06-07 ENCOUNTER — NON-APPOINTMENT (OUTPATIENT)
Age: 29
End: 2024-06-07

## 2024-06-07 ENCOUNTER — APPOINTMENT (OUTPATIENT)
Dept: PSYCHIATRY | Facility: CLINIC | Age: 29
End: 2024-06-07

## 2024-06-07 LAB
CULTURE RESULTS: SIGNIFICANT CHANGE UP
SPECIMEN SOURCE: SIGNIFICANT CHANGE UP

## 2024-06-07 NOTE — DISCUSSION/SUMMARY
[FreeTextEntry1] : Patient called this writer today stating, "Hi, I wanted to call to give you an update. It has nothing to do with mental health. I wanted to let you know I am homeless now." Patient states that since the semester ended for her college, she no longer has housing. She states she was staying at her father's home, but that, "His roommates started asking why I was there, so I was told to leave the home. I was sleeping in the car, but I am now planning to go to the women's center shelter in the [facility]." She specifies she will go to the drop in shelter in [place] and then the women's shelter in the [place]. Patient adamantly denies that she has suicidal ideation or has thoughts she wants to harm herself. She states she feels safe and, again, confirms she has no thoughts of wanting to end her life or to harm herself. She requests a letter from the clinic that states her diagnosis and that she is adhering to her medications and appointments to assist with a housing application. This writer informed patient that a treatment summary letter can be provided. Patient expressed agreement. Patient also informed of options to call 911, contact [phone], or call this writer if she feels like she is in crisis. Patient expressed understanding.

## 2024-06-12 ENCOUNTER — NON-APPOINTMENT (OUTPATIENT)
Age: 29
End: 2024-06-12

## 2024-06-20 RX ORDER — QUETIAPINE 400 MG/1
400 TABLET, FILM COATED, EXTENDED RELEASE ORAL DAILY
Qty: 30 | Refills: 1 | Status: ACTIVE | COMMUNITY
Start: 2022-09-29 | End: 1900-01-01

## 2024-06-20 RX ORDER — GABAPENTIN 300 MG/1
300 CAPSULE ORAL TWICE DAILY
Qty: 60 | Refills: 1 | Status: ACTIVE | COMMUNITY
Start: 2022-02-23 | End: 1900-01-01

## 2024-06-20 RX ORDER — VENLAFAXINE 37.5 MG/1
37.5 TABLET, EXTENDED RELEASE ORAL DAILY
Qty: 30 | Refills: 1 | Status: ACTIVE | COMMUNITY
Start: 2023-02-22 | End: 1900-01-01

## 2024-06-20 RX ORDER — VENLAFAXINE HYDROCHLORIDE 150 MG/1
150 TABLET, EXTENDED RELEASE ORAL DAILY
Qty: 30 | Refills: 1 | Status: ACTIVE | COMMUNITY
Start: 2023-07-13 | End: 1900-01-01

## 2024-07-11 ENCOUNTER — OUTPATIENT (OUTPATIENT)
Dept: OUTPATIENT SERVICES | Facility: HOSPITAL | Age: 29
LOS: 1 days | End: 2024-07-11
Payer: MEDICAID

## 2024-07-11 ENCOUNTER — APPOINTMENT (OUTPATIENT)
Dept: PSYCHIATRY | Facility: CLINIC | Age: 29
End: 2024-07-11
Payer: MEDICAID

## 2024-07-11 DIAGNOSIS — F32.A DEPRESSION, UNSPECIFIED: ICD-10-CM

## 2024-07-11 DIAGNOSIS — F41.9 ANXIETY DISORDER, UNSPECIFIED: ICD-10-CM

## 2024-07-11 DIAGNOSIS — F41.1 GENERALIZED ANXIETY DISORDER: ICD-10-CM

## 2024-07-11 DIAGNOSIS — F43.10 POST-TRAUMATIC STRESS DISORDER, UNSPECIFIED: ICD-10-CM

## 2024-07-11 DIAGNOSIS — F60.3 BORDERLINE PERSONALITY DISORDER: ICD-10-CM

## 2024-07-11 PROCEDURE — ZZZZZ: CPT

## 2024-07-11 PROCEDURE — 99213 OFFICE O/P EST LOW 20 MIN: CPT

## 2024-07-12 ENCOUNTER — NON-APPOINTMENT (OUTPATIENT)
Age: 29
End: 2024-07-12

## 2024-07-12 DIAGNOSIS — F43.10 POST-TRAUMATIC STRESS DISORDER, UNSPECIFIED: ICD-10-CM

## 2024-07-12 DIAGNOSIS — F32.A DEPRESSION, UNSPECIFIED: ICD-10-CM

## 2024-07-12 DIAGNOSIS — F41.9 ANXIETY DISORDER, UNSPECIFIED: ICD-10-CM

## 2024-07-12 DIAGNOSIS — F41.1 GENERALIZED ANXIETY DISORDER: ICD-10-CM

## 2024-07-12 DIAGNOSIS — F60.3 BORDERLINE PERSONALITY DISORDER: ICD-10-CM

## 2024-07-15 ENCOUNTER — APPOINTMENT (OUTPATIENT)
Dept: PSYCHIATRY | Facility: CLINIC | Age: 29
End: 2024-07-15

## 2024-07-15 ENCOUNTER — OUTPATIENT (OUTPATIENT)
Dept: OUTPATIENT SERVICES | Facility: HOSPITAL | Age: 29
LOS: 1 days | End: 2024-07-15
Payer: MEDICAID

## 2024-07-15 DIAGNOSIS — F60.3 BORDERLINE PERSONALITY DISORDER: ICD-10-CM

## 2024-07-15 DIAGNOSIS — F32.A DEPRESSION, UNSPECIFIED: ICD-10-CM

## 2024-07-15 PROCEDURE — 90837 PSYTX W PT 60 MINUTES: CPT

## 2024-07-16 DIAGNOSIS — F32.A DEPRESSION, UNSPECIFIED: ICD-10-CM

## 2024-07-16 DIAGNOSIS — F60.3 BORDERLINE PERSONALITY DISORDER: ICD-10-CM

## 2024-07-18 ENCOUNTER — NON-APPOINTMENT (OUTPATIENT)
Age: 29
End: 2024-07-18

## 2024-07-24 ENCOUNTER — APPOINTMENT (OUTPATIENT)
Dept: PSYCHIATRY | Facility: CLINIC | Age: 29
End: 2024-07-24

## 2024-07-24 NOTE — REASON FOR VISIT
[Patient preference] : as per patient preference [Access issues (e.g., transportation, impaired mobility, etc.)] : due to patient's access issues [Restricted in physically strenuous activity but ambulatory and able to carry out work of a light or sedentary nature] : Status 1- Restricted in physically strenuous activity but ambulatory and able to carry out work of a light or sedentary nature, e.g., light house work, office work [Normal] : affect appropriate [Telehealth (audio & video) - Individual/Group] : This visit was provided via telehealth using real-time 2-way audio visual technology. [Other Location: e.g. Home (Enter Location, City,State)___] : The patient, [unfilled], was located at [unfilled] at the time of the visit. [Verbal consent obtained from patient/other participant(s)] : Verbal consent for telehealth/telephonic services obtained from patient/other participant(s) [Patient with collateral] : Patient with collateral  [Friend] : friend [FreeTextEntry4] : 2:31 PM [FreeTextEntry5] : 3:06 PM [TextBox_17] : Maxwell Thao [FreeTextEntry1] : Psychotherapy follow-up visit with the treatment diagnoses of 1) (301.83) (F60.3) Borderline personality disorder 2) (311) (F32.A) Depression 3) (309.81) (F43.10) PTSD (post-traumatic stress disorder) 4) (300.00) (F41.9) Anxiety

## 2024-07-24 NOTE — PLAN
[[facility]] : [facility]  [Motivational Interviewing] : Motivational Interviewing  [Supportive Therapy] : Supportive Therapy [FreeTextEntry2] : The patient was recently referred to individual therapy. The therapist is in the process of assessing the patient's needs for individual psychotherapy and discussing the treatment plan with the patient.  [de-identified] : Julio (preferred name) missed one of their medications for a few days last week due to difficulty filling prescriptions in the local pharmacy. After contacting Dr. Bennett, they could  the medication in the city. The patient was aware of the upcoming appointment with Dr. Bennett and confirmed to attend. Julio reported having a difficult week due to a medication problem (mentioned above) and getting sick with Bronchitis. They completed the antibiotic treatment, reported a fair mood, and began to feel better physically. They denied imminent concerns and reported receiving support from their support network. We completed the safety plan during today's session. We will start discussing the treatment plan during the next session.     Action Plan & Practice Task(s): - Monitor symptoms - Maintain medication management as prescribe - Review the Safety Plan   Support Network (established contact consent/PHI Release): - Collateral & Emergency Contact: Chapo Sheikh, partner, [phone] - Collateral & Emergency Contact: Maxwell Thao, friend, [phone]  Medical Provider (Pt provided HIPPA Release): - Cardiologist, Dr. Manda Tracey, [phone] - PCP/NP, Amanda Martinez, [phone]     Follow-up: - Return in 2 week(s)

## 2024-07-24 NOTE — PLAN
[[facility]] : [facility]  [Motivational Interviewing] : Motivational Interviewing  [Supportive Therapy] : Supportive Therapy [FreeTextEntry2] : The patient was recently referred to individual therapy. The therapist is in the process of assessing the patient's needs for individual psychotherapy and discussing the treatment plan with the patient.  [de-identified] : Julio (preferred name) missed one of their medications for a few days last week due to difficulty filling prescriptions in the local pharmacy. After contacting Dr. Bennett, they could  the medication in the city. The patient was aware of the upcoming appointment with Dr. Bennett and confirmed to attend. Jluio reported having a difficult week due to a medication problem (mentioned above) and getting sick with Bronchitis. They completed the antibiotic treatment, reported a fair mood, and began to feel better physically. They denied imminent concerns and reported receiving support from their support network. We completed the safety plan during today's session. We will start discussing the treatment plan during the next session.     Action Plan & Practice Task(s): - Monitor symptoms - Maintain medication management as prescribe - Review the Safety Plan   Support Network (established contact consent/PHI Release): - Collateral & Emergency Contact: Chapo Sheikh, partner, [phone] - Collateral & Emergency Contact: Maxwell Thao, friend, [phone]  Medical Provider (Pt provided HIPPA Release): - Cardiologist, Dr. Manda Tracey, [phone] - PCP/NP, Amanda Martinez, [phone]     Follow-up: - Return in 2 week(s)

## 2024-07-24 NOTE — REASON FOR VISIT
[Patient preference] : as per patient preference [Access issues (e.g., transportation, impaired mobility, etc.)] : due to patient's access issues [Telehealth (audio & video) - Individual/Group] : This visit was provided via telehealth using real-time 2-way audio visual technology. [Other Location: e.g. Home (Enter Location, City,State)___] : The patient, [unfilled], was located at [unfilled] at the time of the visit. [Verbal consent obtained from patient/other participant(s)] : Verbal consent for telehealth/telephonic services obtained from patient/other participant(s) [Patient with collateral] : Patient with collateral  [Friend] : friend [FreeTextEntry4] : 2:31 PM [FreeTextEntry5] : 3:06 PM [TextBox_17] : Maxwell Thao [FreeTextEntry1] : Psychotherapy follow-up visit with the treatment diagnoses of 1) (301.83) (F60.3) Borderline personality disorder 2) (311) (F32.A) Depression 3) (309.81) (F43.10) PTSD (post-traumatic stress disorder) 4) (300.00) (F41.9) Anxiety

## 2024-07-24 NOTE — RISK ASSESSMENT
[Yes, patient reports ideation or behavior] : Yes, patient reports ideation or behavior [No, patient denies ideation or behavior] : No, patient denies ideation or behavior [No] : No [Moderate acute suicide risk] : Moderate acute suicide risk [Yes] : Yes [FreeTextEntry8] : Pt had multiple times of s/a, age [age] (hang self, self-aborted, [facility] adm[facility] hospital), age 22 (cutting, IPP 2 days after, self-admitted), age [age] (cutting, IPP, self-admitted), hx NSSIB, last incident 6 months (cutting with anything), uses cannabis to manage pain and anxiety. Hx of alcohole abuse, attaining sobriety since 2022. [FreeTextEntry3] : We began discussing safety plan today, 7/15/24 and could complete the plan in the next session.

## 2024-07-25 ENCOUNTER — APPOINTMENT (OUTPATIENT)
Dept: PSYCHIATRY | Facility: CLINIC | Age: 29
End: 2024-07-25
Payer: MEDICAID

## 2024-07-25 ENCOUNTER — NON-APPOINTMENT (OUTPATIENT)
Age: 29
End: 2024-07-25

## 2024-07-25 DIAGNOSIS — F32.A DEPRESSION, UNSPECIFIED: ICD-10-CM

## 2024-07-25 DIAGNOSIS — F41.9 ANXIETY DISORDER, UNSPECIFIED: ICD-10-CM

## 2024-07-25 DIAGNOSIS — F43.10 POST-TRAUMATIC STRESS DISORDER, UNSPECIFIED: ICD-10-CM

## 2024-07-25 DIAGNOSIS — F60.3 BORDERLINE PERSONALITY DISORDER: ICD-10-CM

## 2024-07-25 PROCEDURE — ZZZZZ: CPT

## 2024-07-25 NOTE — PHYSICAL EXAM
[None] : none [Well groomed] : well groomed [Cooperative] : cooperative [Depressed] : depressed [Full] : full [Clear] : clear [Overproductive] : overproductive [Linear/Goal Directed] : linear/goal directed [None Reported] : none reported [WNL] : within normal limits [Average] : average [Anxious] : anxious [FreeTextEntry1] : overweight [FreeTextEntry8] : reports feeling chronically this way [FreeTextEntry7] : chronic fleeting suicidal thoughts, no intent [de-identified] : fair [de-identified] : fair

## 2024-07-25 NOTE — PLAN
[No] : No [FreeTextEntry4] : To keep bpd, depression, ptsd and anxiety under control [FreeTextEntry5] : - Patient was receiving ECT at [facility] - Needs cardiologist clearance before returning. Currently not cleared as w/u ongoing. - reduce Effexor XR to 150mg daily to target anxiety and depression (from 187.5) --- reduced to trial effect on HR and BP, which need to be better controlled for patient to resume ECT - c/w Quetiapine extended release 400mg PO QHS - c/w Gabapentin 300 mg po BID prn for anxiety symptoms - c/w Vistaril 25 mg q8 PRN for anxiety - c/w outpatient therapy biweekly at [facility] w/ Mag - consider IOP if patient agrees and does not improve on current regimen  - safety plan discussed and completed with patient - continue to encourage reduction in marijuana usage - Follow up in 2 weeks (08/8/24 at 4PM)

## 2024-07-25 NOTE — DISCUSSION/SUMMARY
[FreeTextEntry1] : Patient is a 29 y/o F (identifies as "Julio," non-binary, they/them pronouns), single with no dependents, domiciled with roommate on [address], employed parttime at [employer], student at [employer], with PMHx of heart murmur with atrial septal defect (benign), PPHx of borderline personality disorder, PTSD, obsessive compulsive sx, with 2 prior IPP admissions and 1 prior suicide attempt at age 15 via pill ingestion, current medical marijuana use for PTSD, presenting for follow up examination.  Risk assessment: Risk factors include history of suicide attempt, personality disorder diagnosis, PTSD symptoms, depressive symptoms, SIB and recent trigger; protective factors include no acute suicidality, future oriented towards completing coursework, social support from family/friends, engaged and compliant with treatment and help seeking behavior. Patient is therefore low to mod acute suicide risk despite her chronically elevated risk.  On previous psychiatric evaluation before receiving first dose of ECT on 1/2/2024, patient endorses feeling depressed but presents with euthymic mood. There is marked incongruence between her stated mood and presenting affect; high suspicion that there may be a strong component of borderline personality disorder and autism spectrum disorder potentially affecting her mood. At this time, patient is not acutely suicidal, manic, psychotic, paranoid, or delusional. Endorses ongoing fleeting SI with no intent, and increasing depressive symptoms since last ECT treatment a few months ago. Patient remains not clear for additional treatment due to cardiology workup of hypertension during last ECT treatment.   On continued evaluation today, patient is reporting improvement in her mood despite several acute social stressors that were negatively impacting her mood during the last visit, and is thankful for referaly to psychotherapy which she states is helpful; particularly in the absence of the ability to continue getting ECT at this time. Patient reports feeling safe today and was willing to talk through safety plan if she began having worsening symptoms. Plan to reduce effexor from 187.5 to 150mg to trial effect on improving HR and BP due to patients continued desire to resume ECT in the future. Remains stable enough for outpatient level of care. Follow up in two weeks.
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
## 2024-07-25 NOTE — RISK ASSESSMENT
[Yes, patient reports ideation or behavior] : Yes, patient reports ideation or behavior [No, patient denies ideation or behavior] : No, patient denies ideation or behavior [No] : No [Yes, more than three months ago] : Yes, more than three months ago [Low acute suicide risk] : Low acute suicide risk [Yes] : Safety Plan completed/updated (for individuals at risk): Yes [FreeTextEntry8] : reports chronic but no plan or intent

## 2024-07-25 NOTE — REASON FOR VISIT
[Patient] : Patient [FreeTextEntry1] : medication management; "I'm feeling better, thanks for seeing me"

## 2024-07-25 NOTE — SOCIAL HISTORY
[FreeTextEntry1] : 2 previous engagements, was in same-sex relationship since 10/2018. Currently unemployed, Former [employer] ; Studying for bachelors in psychology and minor in women and gender studies. Mom in [place], Dad in [place]. Has 7 siblings, raised 5 siblings. Has friends. Worked as pharmacy tech. Domiciled with grandmother in [facility].   Description: medicinal marijuana for PTSD; 0.5 cartridge/month with daily use

## 2024-07-31 ENCOUNTER — APPOINTMENT (OUTPATIENT)
Dept: PSYCHIATRY | Facility: CLINIC | Age: 29
End: 2024-07-31

## 2024-07-31 ENCOUNTER — OUTPATIENT (OUTPATIENT)
Dept: OUTPATIENT SERVICES | Facility: HOSPITAL | Age: 29
LOS: 1 days | End: 2024-07-31
Payer: MEDICAID

## 2024-07-31 DIAGNOSIS — F60.3 BORDERLINE PERSONALITY DISORDER: ICD-10-CM

## 2024-07-31 DIAGNOSIS — F43.10 POST-TRAUMATIC STRESS DISORDER, UNSPECIFIED: ICD-10-CM

## 2024-07-31 DIAGNOSIS — F32.A DEPRESSION, UNSPECIFIED: ICD-10-CM

## 2024-07-31 DIAGNOSIS — F41.9 ANXIETY DISORDER, UNSPECIFIED: ICD-10-CM

## 2024-07-31 PROCEDURE — 90834 PSYTX W PT 45 MINUTES: CPT

## 2024-07-31 NOTE — REASON FOR VISIT
[Patient preference] : as per patient preference [Access issues (e.g., transportation, impaired mobility, etc.)] : due to patient's access issues [Telehealth (audio & video) - Individual/Group] : This visit was provided via telehealth using real-time 2-way audio visual technology. [Other Location: e.g. Home (Enter Location, City,State)___] : The patient, [unfilled], was located at [unfilled] at the time of the visit. [Verbal consent obtained from patient/other participant(s)] : Verbal consent for telehealth/telephonic services obtained from patient/other participant(s) [Patient] : Patient [FreeTextEntry4] : 2:01 PM [FreeTextEntry5] : 2:41 PM [FreeTextEntry1] : Psychotherapy follow-up visit with the treatment diagnoses of 1) (301.83) (F60.3) Borderline personality disorder 2) (311) (F32.A) Depression 3) (309.81) (F43.10) PTSD (post-traumatic stress disorder) 4) (300.00) (F41.9) Anxiety

## 2024-07-31 NOTE — END OF VISIT
[Duration of Psychotherapy Visit (minutes spent in synchronous communication): ____] : Duration of Psychotherapy Visit (minutes spent in synchronous communication): [unfilled] [Licensed Clinician] : Licensed Clinician [Individual Psychotherapy for 38-52 minutes] : Individual Psychotherapy for 38 - 52 minutes

## 2024-07-31 NOTE — PLAN
[[facility]] : [facility]  [Motivational Interviewing] : Motivational Interviewing  [Supportive Therapy] : Supportive Therapy [FreeTextEntry2] : The patient was recently referred to individual therapy. The therapist is in the process of assessing the patient's needs for individual psychotherapy and discussing the treatment plan with the patient.  [de-identified] : Ankit (preferred name) confirmed taking all medications as prescribed and attending scheduled appointments. The patient reported feeling better physically but still coughing and reported improved mood overall but slight irritability. They denied having s/i since the previous session and denied imminent concerning symptoms. Ankit confirmed reviewing the safety plan and discussed it with their support network. We began discussing the treatment plan, especially their long-term goal and barriers. We will complete the treatment plan in the next session.      Action Plan & Practice Task(s): - Monitor symptoms - Maintain medication management as prescribe - Review the Safety Plan   Support Network (established contact consent/PHI Release): - Collateral & Emergency Contact: Chapo Sheikh, partner, [phone] - Collateral & Emergency Contact: Maxwell Master, friend, [phone]  Medical Provider (Pt provided HIPPA Release): - Cardiologist, Dr. Manda Tracey, [phone] - PCP/NP, Amanda Martinez, [phone]     Follow-up: - Return in 2 week(s)  Statement Selected

## 2024-07-31 NOTE — RISK ASSESSMENT
[Yes, patient reports ideation or behavior] : Yes, patient reports ideation or behavior [No, patient denies ideation or behavior] : No, patient denies ideation or behavior [Low acute suicide risk] : Low acute suicide risk [No] : No [Yes] : Safety Plan completed/updated (for individuals at risk): Yes [FreeTextEntry8] : Pt had multiple times of s/a, age [age] (hang self, self-aborted, IPP adm/[facility] hospital), age 22 (cutting, IPP 2 days after, self-admitted), age [age] (cutting, IPP, self-admitted), hx NSSIB, last incident 6 months (cutting with anything), uses cannabis to manage pain and anxiety. Hx of alcohole abuse, attaining sobriety since 2022. [FreeTextEntry3] : Pt was added to the high-risk patient list on 7/18/24.

## 2024-07-31 NOTE — PLAN
[[facility]] : [facility]  [Motivational Interviewing] : Motivational Interviewing  [Supportive Therapy] : Supportive Therapy [FreeTextEntry2] : The patient was recently referred to individual therapy. The therapist is in the process of assessing the patient's needs for individual psychotherapy and discussing the treatment plan with the patient.  [de-identified] : Ankit (preferred name) confirmed taking all medications as prescribed and attending scheduled appointments. The patient reported feeling better physically but still coughing and reported improved mood overall but slight irritability. They denied having s/i since the previous session and denied imminent concerning symptoms. Ankit confirmed reviewing the safety plan and discussed it with their support network. We began discussing the treatment plan, especially their long-term goal and barriers. We will complete the treatment plan in the next session.      Action Plan & Practice Task(s): - Monitor symptoms - Maintain medication management as prescribe - Review the Safety Plan   Support Network (established contact consent/PHI Release): - Collateral & Emergency Contact: Chapo Sheikh, partner, [phone] - Collateral & Emergency Contact: Maxwell Master, friend, [phone]  Medical Provider (Pt provided HIPPA Release): - Cardiologist, Dr. Manda Tracey, [phone] - PCP/NP, Amanda Martinez, [phone]     Follow-up: - Return in 2 week(s)

## 2024-08-01 ENCOUNTER — NON-APPOINTMENT (OUTPATIENT)
Age: 29
End: 2024-08-01

## 2024-08-01 DIAGNOSIS — F43.10 POST-TRAUMATIC STRESS DISORDER, UNSPECIFIED: ICD-10-CM

## 2024-08-01 DIAGNOSIS — F32.A DEPRESSION, UNSPECIFIED: ICD-10-CM

## 2024-08-01 DIAGNOSIS — F60.3 BORDERLINE PERSONALITY DISORDER: ICD-10-CM

## 2024-08-01 DIAGNOSIS — F41.9 ANXIETY DISORDER, UNSPECIFIED: ICD-10-CM

## 2024-08-06 ENCOUNTER — NON-APPOINTMENT (OUTPATIENT)
Age: 29
End: 2024-08-06

## 2024-08-08 ENCOUNTER — APPOINTMENT (OUTPATIENT)
Dept: PSYCHIATRY | Facility: CLINIC | Age: 29
End: 2024-08-08

## 2024-08-08 ENCOUNTER — NON-APPOINTMENT (OUTPATIENT)
Age: 29
End: 2024-08-08

## 2024-08-09 ENCOUNTER — NON-APPOINTMENT (OUTPATIENT)
Age: 29
End: 2024-08-09

## 2024-08-09 ENCOUNTER — APPOINTMENT (OUTPATIENT)
Dept: PSYCHIATRY | Facility: CLINIC | Age: 29
End: 2024-08-09

## 2024-08-09 ENCOUNTER — OUTPATIENT (OUTPATIENT)
Dept: OUTPATIENT SERVICES | Facility: HOSPITAL | Age: 29
LOS: 1 days | End: 2024-08-09
Payer: MEDICAID

## 2024-08-09 DIAGNOSIS — F41.9 ANXIETY DISORDER, UNSPECIFIED: ICD-10-CM

## 2024-08-09 DIAGNOSIS — F32.A DEPRESSION, UNSPECIFIED: ICD-10-CM

## 2024-08-09 DIAGNOSIS — F43.10 POST-TRAUMATIC STRESS DISORDER, UNSPECIFIED: ICD-10-CM

## 2024-08-09 DIAGNOSIS — F60.3 BORDERLINE PERSONALITY DISORDER: ICD-10-CM

## 2024-08-09 PROCEDURE — 99213 OFFICE O/P EST LOW 20 MIN: CPT | Mod: 95

## 2024-08-09 PROCEDURE — ZZZZZ: CPT

## 2024-08-09 NOTE — RISK ASSESSMENT
[Yes, patient reports ideation or behavior] : Yes, patient reports ideation or behavior [No, patient denies ideation or behavior] : No, patient denies ideation or behavior [No] : No [Yes, more than three months ago] : Yes, more than three months ago [FreeTextEntry8] : reports chronic but no plan or intent [Low acute suicide risk] : Low acute suicide risk [Yes] : Safety Plan completed/updated (for individuals at risk): Yes

## 2024-08-09 NOTE — SOCIAL HISTORY
[FreeTextEntry1] : 2 previous engagements, was in same-sex relationship since 10/2018. Currently unemployed, Former [employer] ; Studying for bachelors in psychology and minor in women and gender studies. Mom in [place], Dad in [place]. Has 7 siblings, raised 5 siblings. Has friends. Worked as pharmacy tech. Domiciled with grandmother in [facility].   Description: medicinal marijuana for PTSD; 0.5 cartridge/month with daily use

## 2024-08-09 NOTE — PLAN
[No] : No [FreeTextEntry4] : To keep bpd, depression, ptsd and anxiety under control [FreeTextEntry5] : - Patient was receiving ECT at [facility] - Needs cardiologist clearance before returning. Currently not cleared as w/u ongoing --- Called Cardiologist office, patient needs to schedule follow-up appointment, patient informed and agreed - c/w Effexor XR to 150mg daily to target anxiety and depression --- reduced to trial effect on HR and BP, which need to be better controlled for patient to resume ECT - c/w Quetiapine extended release 400mg PO QHS - c/w Gabapentin 300 mg po BID prn for anxiety symptoms - c/w Vistaril 25 mg q8 PRN for anxiety - c/w outpatient therapy biweekly at [facility] w/ Mag - consider IOP if patient agrees and does not improve on current regimen  - safety plan discussed and completed with patient - continue to encourage reduction in marijuana usage - Follow up in 4 weeks (09/6/24 at 1PM)

## 2024-08-09 NOTE — DISCUSSION/SUMMARY
[FreeTextEntry1] : Received call back from [facility] physicians staff (patients cardiologist office), who stated that the providers that the patient was scheduled to see (Dr Tracey or YANA Flores) were not available until September 2024, but that the patient had canceled at least 3 appointments on April 26th, April 29th and May 3rd 2024. Patient has not been seen since then, and she will be with a new provider most likely. Staff member stated that patient has upcoming appointment with OBGYN in the same office on Aug 12th, and that she should reschedule the cardiology f/u at that time.
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
## 2024-08-09 NOTE — PHYSICAL EXAM
[None] : none [Well groomed] : well groomed [Cooperative] : cooperative [Anxious] : anxious [Depressed] : depressed [Full] : full [Clear] : clear [Overproductive] : overproductive [Linear/Goal Directed] : linear/goal directed [None Reported] : none reported [Average] : average [WNL] : within normal limits [FreeTextEntry1] : overweight [FreeTextEntry8] : reports feeling chronically this way [FreeTextEntry7] : chronic fleeting suicidal thoughts, no intent [de-identified] : fair [de-identified] : fair

## 2024-08-09 NOTE — DISCUSSION/SUMMARY
[FreeTextEntry1] : Received call back from [facility] physicians staff (patients cardiologist office), who stated that the providers that the patient was scheduled to see (Dr Tracey or YANA Flores) were not available until September 2024, but that the patient had canceled at least 3 appointments on April 26th, April 29th and May 3rd 2024. Patient has not been seen since then, and she will be with a new provider most likely. Staff member stated that patient has upcoming appointment with OBGYN in the same office on Aug 12th, and that she should reschedule the cardiology f/u at that time.

## 2024-08-09 NOTE — DISCUSSION/SUMMARY
[FreeTextEntry1] : Patient is a 27 y/o F (identifies as "Julio," non-binary, they/them pronouns), single with no dependents, domiciled with roommate on [address], employed parttime at [employer], student at [employer], with PMHx of heart murmur with atrial septal defect (benign), PPHx of borderline personality disorder, PTSD, obsessive compulsive sx, with 2 prior IPP admissions and 1 prior suicide attempt at age 15 via pill ingestion, current medical marijuana use for PTSD, presenting for follow up examination.  Risk assessment: Risk factors include history of suicide attempt, personality disorder diagnosis, PTSD symptoms, depressive symptoms, SIB and recent trigger; protective factors include no acute suicidality, future oriented towards completing coursework, social support from family/friends, engaged and compliant with treatment and help seeking behavior. Patient is therefore low to mod acute suicide risk despite her chronically elevated risk.  On previous psychiatric evaluation before receiving first dose of ECT on 1/2/2024, patient endorses feeling depressed but presents with euthymic mood. There is marked incongruence between her stated mood and presenting affect; high suspicion that there may be a strong component of borderline personality disorder and autism spectrum disorder potentially affecting her mood. At this time, patient is not acutely suicidal, manic, psychotic, paranoid, or delusional. Endorses ongoing fleeting SI with no intent, and increasing depressive symptoms since last ECT treatment a few months ago. Patient remains not clear for additional treatment due to cardiology workup of hypertension during last ECT treatment.   On continued evaluation today, patient is reporting improvement in her mood despite several acute social stressors that were negatively impacting her mood during the last visit, and is continuing with psychotherapy which she states is helpful; particularly in the absence of the ability to continue getting ECT at this time. Patient reports feeling safe today and was willing to talk through safety plan if she began having worsening symptoms. Plan to continue effexor 150mg to trial effect on improving HR and BP due to patients continued desire to resume ECT in the future. Remains stable enough for outpatient level of care. Follow up in two weeks.

## 2024-08-10 DIAGNOSIS — F41.9 ANXIETY DISORDER, UNSPECIFIED: ICD-10-CM

## 2024-08-10 DIAGNOSIS — F43.10 POST-TRAUMATIC STRESS DISORDER, UNSPECIFIED: ICD-10-CM

## 2024-08-10 DIAGNOSIS — F60.3 BORDERLINE PERSONALITY DISORDER: ICD-10-CM

## 2024-08-10 DIAGNOSIS — F32.A DEPRESSION, UNSPECIFIED: ICD-10-CM

## 2024-08-11 ENCOUNTER — NON-APPOINTMENT (OUTPATIENT)
Age: 29
End: 2024-08-11

## 2024-08-14 ENCOUNTER — APPOINTMENT (OUTPATIENT)
Dept: PSYCHIATRY | Facility: CLINIC | Age: 29
End: 2024-08-14

## 2024-08-30 ENCOUNTER — APPOINTMENT (OUTPATIENT)
Dept: PSYCHIATRY | Facility: CLINIC | Age: 29
End: 2024-08-30

## 2024-09-14 ENCOUNTER — NON-APPOINTMENT (OUTPATIENT)
Age: 29
End: 2024-09-14

## 2024-09-18 ENCOUNTER — NON-APPOINTMENT (OUTPATIENT)
Age: 29
End: 2024-09-18

## 2024-09-19 ENCOUNTER — OUTPATIENT (OUTPATIENT)
Dept: OUTPATIENT SERVICES | Facility: HOSPITAL | Age: 29
LOS: 1 days | End: 2024-09-19
Payer: MEDICAID

## 2024-09-19 ENCOUNTER — EMERGENCY (EMERGENCY)
Facility: HOSPITAL | Age: 29
LOS: 1 days | Discharge: HOME | End: 2024-09-19
Attending: EMERGENCY MEDICINE
Payer: MEDICAID

## 2024-09-19 ENCOUNTER — APPOINTMENT (OUTPATIENT)
Dept: PSYCHIATRY | Facility: CLINIC | Age: 29
End: 2024-09-19

## 2024-09-19 VITALS
SYSTOLIC BLOOD PRESSURE: 114 MMHG | DIASTOLIC BLOOD PRESSURE: 63 MMHG | RESPIRATION RATE: 18 BRPM | TEMPERATURE: 98 F | OXYGEN SATURATION: 99 % | HEART RATE: 80 BPM

## 2024-09-19 VITALS
DIASTOLIC BLOOD PRESSURE: 77 MMHG | SYSTOLIC BLOOD PRESSURE: 118 MMHG | HEART RATE: 89 BPM | WEIGHT: 174.17 LBS | TEMPERATURE: 99 F | RESPIRATION RATE: 18 BRPM | HEIGHT: 59 IN | OXYGEN SATURATION: 97 %

## 2024-09-19 DIAGNOSIS — F43.10 POST-TRAUMATIC STRESS DISORDER, UNSPECIFIED: ICD-10-CM

## 2024-09-19 DIAGNOSIS — F60.3 BORDERLINE PERSONALITY DISORDER: ICD-10-CM

## 2024-09-19 DIAGNOSIS — F32.A DEPRESSION, UNSPECIFIED: ICD-10-CM

## 2024-09-19 DIAGNOSIS — F41.9 ANXIETY DISORDER, UNSPECIFIED: ICD-10-CM

## 2024-09-19 LAB
ALBUMIN SERPL ELPH-MCNC: 4.1 G/DL — SIGNIFICANT CHANGE UP (ref 3.5–5)
ALP SERPL-CCNC: 113 U/L — SIGNIFICANT CHANGE UP (ref 40–120)
ALT FLD-CCNC: 29 U/L DA — SIGNIFICANT CHANGE UP (ref 10–60)
ANION GAP SERPL CALC-SCNC: 5 MMOL/L — SIGNIFICANT CHANGE UP (ref 5–17)
APPEARANCE UR: CLEAR — SIGNIFICANT CHANGE UP
AST SERPL-CCNC: 14 U/L — SIGNIFICANT CHANGE UP (ref 10–40)
BASE EXCESS BLDV CALC-SCNC: 3.9 MMOL/L — SIGNIFICANT CHANGE UP
BASOPHILS # BLD AUTO: 0.05 K/UL — SIGNIFICANT CHANGE UP (ref 0–0.2)
BASOPHILS NFR BLD AUTO: 0.4 % — SIGNIFICANT CHANGE UP (ref 0–2)
BILIRUB SERPL-MCNC: 0.4 MG/DL — SIGNIFICANT CHANGE UP (ref 0.2–1.2)
BILIRUB UR-MCNC: NEGATIVE — SIGNIFICANT CHANGE UP
BUN SERPL-MCNC: 8 MG/DL — SIGNIFICANT CHANGE UP (ref 7–18)
CA-I SERPL-SCNC: SIGNIFICANT CHANGE UP MMOL/L (ref 1.15–1.33)
CALCIUM SERPL-MCNC: 9.6 MG/DL — SIGNIFICANT CHANGE UP (ref 8.4–10.5)
CHLORIDE SERPL-SCNC: 106 MMOL/L — SIGNIFICANT CHANGE UP (ref 96–108)
CO2 SERPL-SCNC: 28 MMOL/L — SIGNIFICANT CHANGE UP (ref 22–31)
COLOR SPEC: YELLOW — SIGNIFICANT CHANGE UP
CREAT SERPL-MCNC: 0.88 MG/DL — SIGNIFICANT CHANGE UP (ref 0.5–1.3)
DIFF PNL FLD: NEGATIVE — SIGNIFICANT CHANGE UP
EGFR: 90 ML/MIN/1.73M2 — SIGNIFICANT CHANGE UP
EOSINOPHIL # BLD AUTO: 0.34 K/UL — SIGNIFICANT CHANGE UP (ref 0–0.5)
EOSINOPHIL NFR BLD AUTO: 2.4 % — SIGNIFICANT CHANGE UP (ref 0–6)
GAS PNL BLDV: 135 MMOL/L — LOW (ref 136–145)
GAS PNL BLDV: SIGNIFICANT CHANGE UP
GLUCOSE SERPL-MCNC: 95 MG/DL — SIGNIFICANT CHANGE UP (ref 70–99)
GLUCOSE UR QL: NEGATIVE MG/DL — SIGNIFICANT CHANGE UP
HCG SERPL-ACNC: <1 MIU/ML — SIGNIFICANT CHANGE UP
HCO3 BLDV-SCNC: 31 MMOL/L — HIGH (ref 22–29)
HCT VFR BLD CALC: 42.4 % — SIGNIFICANT CHANGE UP (ref 34.5–45)
HGB BLD-MCNC: 13.8 G/DL — SIGNIFICANT CHANGE UP (ref 11.5–15.5)
IMM GRANULOCYTES NFR BLD AUTO: 0.2 % — SIGNIFICANT CHANGE UP (ref 0–0.9)
KETONES UR-MCNC: NEGATIVE MG/DL — SIGNIFICANT CHANGE UP
LACTATE BLDV-MCNC: 1.4 MMOL/L — SIGNIFICANT CHANGE UP (ref 0.5–2)
LEUKOCYTE ESTERASE UR-ACNC: NEGATIVE — SIGNIFICANT CHANGE UP
LIDOCAIN IGE QN: 52 U/L — SIGNIFICANT CHANGE UP (ref 13–75)
LYMPHOCYTES # BLD AUTO: 1.38 K/UL — SIGNIFICANT CHANGE UP (ref 1–3.3)
LYMPHOCYTES # BLD AUTO: 9.8 % — LOW (ref 13–44)
MAGNESIUM SERPL-MCNC: 2.2 MG/DL — SIGNIFICANT CHANGE UP (ref 1.6–2.6)
MCHC RBC-ENTMCNC: 26.8 PG — LOW (ref 27–34)
MCHC RBC-ENTMCNC: 32.5 GM/DL — SIGNIFICANT CHANGE UP (ref 32–36)
MCV RBC AUTO: 82.5 FL — SIGNIFICANT CHANGE UP (ref 80–100)
MONOCYTES # BLD AUTO: 0.89 K/UL — SIGNIFICANT CHANGE UP (ref 0–0.9)
MONOCYTES NFR BLD AUTO: 6.3 % — SIGNIFICANT CHANGE UP (ref 2–14)
NEUTROPHILS # BLD AUTO: 11.42 K/UL — HIGH (ref 1.8–7.4)
NEUTROPHILS NFR BLD AUTO: 80.9 % — HIGH (ref 43–77)
NITRITE UR-MCNC: NEGATIVE — SIGNIFICANT CHANGE UP
NRBC # BLD: 0 /100 WBCS — SIGNIFICANT CHANGE UP (ref 0–0)
PCO2 BLDV: 58 MMHG — HIGH (ref 39–42)
PH BLDV: 7.34 — SIGNIFICANT CHANGE UP (ref 7.32–7.43)
PH UR: 7.5 — SIGNIFICANT CHANGE UP (ref 5–8)
PHOSPHATE SERPL-MCNC: 3 MG/DL — SIGNIFICANT CHANGE UP (ref 2.5–4.5)
PLATELET # BLD AUTO: 292 K/UL — SIGNIFICANT CHANGE UP (ref 150–400)
PO2 BLDV: 27 MMHG — SIGNIFICANT CHANGE UP
POTASSIUM BLDV-SCNC: 4.5 MMOL/L — SIGNIFICANT CHANGE UP (ref 3.5–5.1)
POTASSIUM SERPL-MCNC: 4.2 MMOL/L — SIGNIFICANT CHANGE UP (ref 3.5–5.3)
POTASSIUM SERPL-SCNC: 4.2 MMOL/L — SIGNIFICANT CHANGE UP (ref 3.5–5.3)
PROT SERPL-MCNC: 8.2 G/DL — SIGNIFICANT CHANGE UP (ref 6–8.3)
PROT UR-MCNC: NEGATIVE MG/DL — SIGNIFICANT CHANGE UP
RBC # BLD: 5.14 M/UL — SIGNIFICANT CHANGE UP (ref 3.8–5.2)
RBC # FLD: 13.8 % — SIGNIFICANT CHANGE UP (ref 10.3–14.5)
SAO2 % BLDV: 46 % — SIGNIFICANT CHANGE UP
SARS-COV-2 RNA SPEC QL NAA+PROBE: SIGNIFICANT CHANGE UP
SODIUM SERPL-SCNC: 139 MMOL/L — SIGNIFICANT CHANGE UP (ref 135–145)
SP GR SPEC: 1.01 — SIGNIFICANT CHANGE UP (ref 1–1.03)
UROBILINOGEN FLD QL: 0.2 MG/DL — SIGNIFICANT CHANGE UP (ref 0.2–1)
WBC # BLD: 14.11 K/UL — HIGH (ref 3.8–10.5)
WBC # FLD AUTO: 14.11 K/UL — HIGH (ref 3.8–10.5)

## 2024-09-19 PROCEDURE — 99285 EMERGENCY DEPT VISIT HI MDM: CPT

## 2024-09-19 PROCEDURE — 90832 PSYTX W PT 30 MINUTES: CPT

## 2024-09-19 PROCEDURE — 71045 X-RAY EXAM CHEST 1 VIEW: CPT | Mod: 26

## 2024-09-19 RX ORDER — KETOROLAC TROMETHAMINE 30 MG/ML
15 INJECTION, SOLUTION INTRAMUSCULAR ONCE
Refills: 0 | Status: DISCONTINUED | OUTPATIENT
Start: 2024-09-19 | End: 2024-09-19

## 2024-09-19 RX ORDER — PREDNISONE 10 MG
2 TABLET, DOSE PACK ORAL
Qty: 10 | Refills: 0
Start: 2024-09-19 | End: 2024-09-23

## 2024-09-19 RX ORDER — ONDANSETRON 2 MG/ML
4 INJECTION, SOLUTION INTRAMUSCULAR; INTRAVENOUS ONCE
Refills: 0 | Status: COMPLETED | OUTPATIENT
Start: 2024-09-19 | End: 2024-09-19

## 2024-09-19 RX ORDER — METHYLPREDNISOLONE 4 MG
100 TABLET ORAL ONCE
Refills: 0 | Status: COMPLETED | OUTPATIENT
Start: 2024-09-19 | End: 2024-09-19

## 2024-09-19 RX ORDER — SODIUM CHLORIDE 9 MG/ML
3000 INJECTION INTRAMUSCULAR; INTRAVENOUS; SUBCUTANEOUS ONCE
Refills: 0 | Status: COMPLETED | OUTPATIENT
Start: 2024-09-19 | End: 2024-09-19

## 2024-09-19 RX ADMIN — SODIUM CHLORIDE 3000 MILLILITER(S): 9 INJECTION INTRAMUSCULAR; INTRAVENOUS; SUBCUTANEOUS at 17:52

## 2024-09-19 RX ADMIN — Medication 100 MILLIGRAM(S): at 20:28

## 2024-09-19 RX ADMIN — KETOROLAC TROMETHAMINE 15 MILLIGRAM(S): 30 INJECTION, SOLUTION INTRAMUSCULAR at 19:58

## 2024-09-19 RX ADMIN — Medication 2.5 MILLIGRAM(S): at 18:53

## 2024-09-19 RX ADMIN — Medication 2.5 MILLIGRAM(S): at 18:00

## 2024-09-19 RX ADMIN — Medication 2.5 MILLIGRAM(S): at 18:26

## 2024-09-19 RX ADMIN — ONDANSETRON 4 MILLIGRAM(S): 2 INJECTION, SOLUTION INTRAMUSCULAR; INTRAVENOUS at 21:43

## 2024-09-19 NOTE — ED ADULT TRIAGE NOTE - CHIEF COMPLAINT QUOTE
Uncontrolled glucose levels and polydipsia x 5 days. Denies vomiting. SOB x 2 days not relieved with inhaler. No distress noted. h/o anorexia

## 2024-09-19 NOTE — ED ADULT NURSE NOTE - OBJECTIVE STATEMENT
Pt presents to the ED c/o elevated blood glucose levels in the 200s and quickly dropping to the 90s x 5 days. Pt endorses feeling more thirsty, constipation, and dizziness lately. Pt denies N/V, SOB and chest pain. .

## 2024-09-19 NOTE — ED PROVIDER NOTE - CLINICAL SUMMARY MEDICAL DECISION MAKING FREE TEXT BOX
29-year-old female diabetic here for evaluation of labile blood sugar.  Also relates nonproductive cough for 2 weeks.  It is no acute distress.  Appears well-hydrated and well-nourished.  Signs of bronchospasm on exam.  Plan to perform chest x-ray provide IV hydration check labs urinalysis and reassess. 29-year-old female diabetic here for evaluation of labile blood sugar.  Also relates nonproductive cough for 2 weeks.  It is no acute distress.  Appears well-hydrated and well-nourished.  Signs of bronchospasm on exam.  Plan to perform chest x-ray provide IV hydration check labs urinalysis and reassess. Possible medication interaction between januvia and antipsychotics

## 2024-09-19 NOTE — ED PROVIDER NOTE - PROGRESS NOTE DETAILS
Patient observed in ED with no significant elevation in blood sugar despite having meal.  Educated on results of labs and urinalysis.  Provided PCP and endocrinology referral for follow-up

## 2024-09-19 NOTE — ED PROVIDER NOTE - OBJECTIVE STATEMENT
29-year-old female history of HTN, HLD, DM, anxiety, autism presenting with labile blood sugars for the past 3 months.  Patient states that she takes Januvia and is compliant with medication but more recently her blood sugar can spike up to 200 after eating.  Also states that her sugar can go down to 70 when she does not eat.  States that she recently moved from [place] to [place] and stopped visiting her endocrinologist in [place].  Relates nonproductive cough for 2 weeks states is hesitant taking steroids due to diabetes.  Reports urinary frequency and urgency without pain or bleeding.  Denies any chest pain, fever, headache, abdominal pain or dizziness. 29-year-old female history of HTN, HLD, DM, anxiety, autism presenting with labile blood sugars for the past 3 months.  Patient states that she takes Januvia and is compliant with medication but more recently her blood sugar can spike up to 200 after eating.  Also states that her sugar can go down to 70 when she does not eat.  States that she recently moved from [place] to [place] and stopped visiting her endocrinologist in [place].  Relates nonproductive cough for 2 weeks states is hesitant taking steroids due to diabetes. On last day of zpack rx by urgent care.  Reports urinary frequency and urgency without pain or bleeding.  Denies any chest pain, fever, headache, abdominal pain or dizziness.

## 2024-09-19 NOTE — ED PROVIDER NOTE - PATIENT PORTAL LINK FT
You can access the FollowMyHealth Patient Portal offered by [facility] by registering at the following website: [email or web address]. By joining [employer]’s FollowMyHealth portal, you will also be able to view your health information using other applications (apps) compatible with our system.

## 2024-09-19 NOTE — ED PROVIDER NOTE - NORMAL, MLM
Caller would like to discuss an/a Order for Ultrasound. Writer advised caller of callback within 24-72 hours.    Patient Name: Dana Allan  Caller Name: Sabrina  Name of Facility: [facility]  Callback Number: [phone]  Additional Info: Caller states patient is scheduled for US tomorrow and needs provider to place order for anatomy Ultrasound. Please advise.      Thank you,  Lizzy Cabrera     herber all pertinent systems normal

## 2024-09-19 NOTE — ED ADULT NURSE NOTE - BIRTH SEX
PATIENT INSTRUCTIONS:    Chronic migraine without aura without status migrainosus, not intractable  Vitamin D deficiency  Chronic daily headache    Chronic tension-type headache, not intractable  Anxiety and depression    Left arm weakness, Paresthesia  -     EMG 2 Limb Upper Extremity; Future      When looking for a counselor/psychologist, ask if they do cognitive behavioral therapy     Headache/migraine treatment:   Abortive medications (for immediate treatment of a headache): It is ok to take ibuprofen, acetaminophen or naproxen (Advil, Tylenol,  Aleve, Excedrin) if they help your headaches you should limit these to No more than 3 times a week to avoid medication overuse/rebound headaches       You can take this daily as needed 1 to 3 times a day, start out with just 1 and see how you feel, if this helps we can transition to take this daily or increase dose  -     gabapentin (NEURONTIN) 100 mg capsule; Take 1 capsule (100 mg total) by mouth 3 (three) times a day as needed (stabbing headache)    Take this for the more severe headaches, but limited 10 a month due to it can be hard on your stomach  -     ketorolac (TORADOL) 10 mg tablet; Take 1 tablet (10 mg total) by mouth daily as needed for severe pain Max 1/day, 3/week, 10/month   Do not use with other OTC pain meds    Over the counter preventive supplements for headaches/migraines   - you can consider starting these, if things not improving  (to take every day to help prevent headaches - not to take at the time of headache):  [x] Magnesium 500mg daily   [x] Riboflavin (Vitamin B2)  400mg daily  (FYI B2 may make your urine bright/neon yellow)     Prescription preventive medications for headaches/migraines   (to take every day to help prevent headaches - not to take at the time of headache):  [x] - venlafaxine (EFFEXOR-XR) continue 75 mg PO daily     Sleep/headache prevention: * if her going to take any of the supplements, I recommend taking this 1 every night for the next few months  [x] Melatonin -  take 3 mg nightly for sleep  You should take this 1 hour prior to bedtime consistently every night for it to work  It works by gradually helping to adjust your sleep time over days to weeks, rather than immediately making you feel sleepy        Lifestyle Recommendations:  [x] SLEEP - Maintain a regular sleep schedule: Adults need at least 7-8 hours of uninterrupted a night  Maintain good sleep hygiene:  Going to bed and waking up at consistent times, avoiding excessive daytime naps, avoiding caffeinated beverages in the evening, avoid excessive stimulation in the evening and generally using bed primarily for sleeping  One hour before bedtime would recommend turning lights down lower, decreasing your activity (may read quietly, listen to music at a low volume)  When you get into bed, should eliminate all technology (no texting, emailing, playing with your phone, iPad or tablet in bed)  [x] HYDRATION - Maintain good hydration  Drink  2L of fluid a day (4 typical small water bottles)  [x] DIET - Maintain good nutrition  In particular don't skip meals and try and eat healthy balanced meals regularly  [x] TRIGGERS - Look for other triggers and avoid them: Limit caffeine to 1-2 cups a day or less  Avoid dietary triggers that you have noticed bring on your headaches (this could include aged cheese, peanuts, MSG, aspartame and nitrates)      Education and Follow-up  [x] Please call with any questions or concerns     [x] follow up with Primary doctor regarding pain that comes down mid sternum and radiates under left arm that started in September      Has follow-up with Dr Tiny Guerrero on 02/14/2019 in [facility] at 3:00 p m , arrive by [phone]  After that, can follow up with either of us, depending on your preference or treatment plan, but just to be safe lets make an appointment for April with me Female

## 2024-09-19 NOTE — ED PROVIDER NOTE - NSFOLLOWUPINSTRUCTIONS_ED_ALL_ED_FT
Bronchospasm    WHAT YOU NEED TO KNOW:    What is bronchospasm? Bronchospasm is a narrowing of your airway that usually comes and goes. It may make it hard for you to breathe. Severe bronchospasm may be life-threatening.    What increases my risk for bronchospasm? Bronchospasms may be triggered by one or more of the following:    Family or personal history of asthma or allergies, such as to pollen, mold, dust, animal dander, latex, or food additives    Upper respiratory infections such as a chest cold    Exercise or increased activity    Air irritants such as smoke, air pollution, strong odors, cold or dry air, or too much air from a ventilator    Medicines such as antibiotics, blood pressure medicines, aspirin, or NSAIDs  What are the signs and symptoms of bronchospasm?    Trouble breathing, often at night, in the morning, or during or after exercise    Coughing    Shortness of breath    Wheezing (whistling sound when you breathe)    Chest tightness and pressure  How is bronchospasm diagnosed? Your healthcare provider will examine you and ask about your history of allergies, asthma, or illnesses. He or she will listen to your breathing. You may need the following:    X-ray pictures of your lungs may show signs of infection, such as an upper respiratory infection or pneumonia.    Pulmonary function tests measure the strength of your breath when you exhale.    CT scan pictures of your lungs may show problems, such as blood clots. You may be given contrast liquid before the pictures are taken to help healthcare providers see the pictures better. Tell the provider if you have ever had an allergic reaction to contrast liquid.  How is bronchospasm treated? The following medicines may help open your airway and reduce swelling in your lungs:    Bronchodilators help expand your airway for easier breathing. Some of these medicines may help prevent future spasms.    Inhaled steroids help reduce swelling in your airway and soothe your breathing. These are used for long-term control.    Anticholinergics help relax and open your airway.  How can I help prevent bronchospasms?    Avoid triggers. Your healthcare provider can help you identify your triggers. You may need to keep a diary of your symptoms. Include where you were and what you were doing when symptoms started. Also include how long symptoms lasted. Make a note of anything that helped or made your symptoms worse. Bring your diary to visits with your healthcare provider. He or she may also recommend skin prick tests or other tests to help find triggers.    Warm up before you exercise. Ask your healthcare provider about the best exercise plan for you.    Keep your immune system healthy. Try to avoid people who are sick. Ask your healthcare provider about vaccines you may need. Vaccines help prevent certain infections that can cause breathing problems. Get a flu vaccine as soon as recommended each year, usually in September or October. Vaccines are also available to prevent COVID-19 and pneumonia. Your provider can tell you if you also need other vaccines, and when to get them.    Breathe through your nose when you are in cold, dry air or weather. This may help reduce lung irritation by warming the air before it reaches your lungs.  Call your local emergency number (911 in the [place]) if:    You have chest pain.    You have severe shortness of breath or trouble breathing.  When should I seek immediate care?    You cough or spit up blood.    You are short of breath.    You have blue fingernails or toenails.    Your heartbeat is fast or not even.  When should I call my doctor?    You have a cough that will not go away.    Your wheezing worsens.    You have a fever.    You have questions or concerns about your condition or care.  CARE AGREEMENT:    You have the right to help plan your care. Learn about your health condition and how it may be treated. Discuss treatment options with your healthcare providers to decide what care you want to receive. You always have the right to refuse treatment.    © [employer] [place]

## 2024-09-20 DIAGNOSIS — F60.3 BORDERLINE PERSONALITY DISORDER: ICD-10-CM

## 2024-09-20 DIAGNOSIS — F43.10 POST-TRAUMATIC STRESS DISORDER, UNSPECIFIED: ICD-10-CM

## 2024-09-20 DIAGNOSIS — F41.9 ANXIETY DISORDER, UNSPECIFIED: ICD-10-CM

## 2024-09-20 DIAGNOSIS — F32.A DEPRESSION, UNSPECIFIED: ICD-10-CM

## 2024-09-20 PROCEDURE — 96375 TX/PRO/DX INJ NEW DRUG ADDON: CPT

## 2024-09-20 PROCEDURE — 84702 CHORIONIC GONADOTROPIN TEST: CPT

## 2024-09-20 PROCEDURE — 82803 BLOOD GASES ANY COMBINATION: CPT

## 2024-09-20 PROCEDURE — 94640 AIRWAY INHALATION TREATMENT: CPT

## 2024-09-20 PROCEDURE — 82962 GLUCOSE BLOOD TEST: CPT

## 2024-09-20 PROCEDURE — 36415 COLL VENOUS BLD VENIPUNCTURE: CPT

## 2024-09-20 PROCEDURE — 83690 ASSAY OF LIPASE: CPT

## 2024-09-20 PROCEDURE — 84132 ASSAY OF SERUM POTASSIUM: CPT

## 2024-09-20 PROCEDURE — 87635 SARS-COV-2 COVID-19 AMP PRB: CPT

## 2024-09-20 PROCEDURE — 84295 ASSAY OF SERUM SODIUM: CPT

## 2024-09-20 PROCEDURE — 81003 URINALYSIS AUTO W/O SCOPE: CPT

## 2024-09-20 PROCEDURE — 83605 ASSAY OF LACTIC ACID: CPT

## 2024-09-20 PROCEDURE — 82330 ASSAY OF CALCIUM: CPT

## 2024-09-20 PROCEDURE — 84100 ASSAY OF PHOSPHORUS: CPT

## 2024-09-20 PROCEDURE — 80053 COMPREHEN METABOLIC PANEL: CPT

## 2024-09-20 PROCEDURE — 96374 THER/PROPH/DIAG INJ IV PUSH: CPT

## 2024-09-20 PROCEDURE — 83735 ASSAY OF MAGNESIUM: CPT

## 2024-09-20 PROCEDURE — 85025 COMPLETE CBC W/AUTO DIFF WBC: CPT

## 2024-09-20 PROCEDURE — 99285 EMERGENCY DEPT VISIT HI MDM: CPT | Mod: 25

## 2024-09-20 PROCEDURE — 71045 X-RAY EXAM CHEST 1 VIEW: CPT

## 2024-09-21 NOTE — DISCUSSION/SUMMARY
[Initial Plan] : Initial Plan [Able to set and pursue goals] : able to set and pursue goals [Adherent to treatment recommendations] : adherent to treatment recommendations [Attempting to realize their potential] : Attempting to realize their potential [Cognitively intact] : cognitively intact [Motivated to participate in treatment] : motivated to participate in treatment [Has vocational interests or hobbies] : has vocational interests or hobbies [Involved in cultural/spiritual/Religion/community activities] : involved in cultural/spiritual/Religion/community activities [English fluency] : English fluency [Connected to healthcare] : connected to healthcare [Access to safe outdoor spaces] : access to safe outdoor spaces [Social supports] : social supports [FreeTextEntry1] : 9/19/2024 [FreeTextEntry2] : 9/19/2025 [FreeTextEntry3] : 4/2/2020 [FreeTextEntry8] : "My forgetfulness, I get sick easily (but always f/u with the doctors), long commute, etc." Pt is considering to transfer their care to [facility], which is closer to where they mostly stay.  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[employer]" [Mental Health] : Mental Health [Initial] : Initial [every ___ months] : every [unfilled] months [every ___ weeks] : every [unfilled] weeks [Improvement in symptoms as evidenced by:] : Improvement in symptoms as evidenced by: [A change in level of care is needed as evidenced by:] : A change in level of care is needed as evidenced by: [Other rationale for transition/discharge:] : Other rationale for transition/discharge: [Yes] : Yes [None - Reason others did not participate:] : None - Reason others did not participate:  [Psychiatric Provider/Prescriber] : Psychiatric Provider/Prescriber [Therapist] : Therapist [Supervisor (if needed)] : Supervisor [FreeTextEntry4] : 1) Long-term (in patient's words): "I want to feel better." 2) Short-term: Ankit will utilize x3 coping skills to regulate their thoughts and emotions to maintain daily functioning.  [de-identified] : A. Pt will utilize x1-3 coping skills daily and maintain medication management daily as prescribed to regulate thoughts and emotions effectively.  [de-identified] : 9/19/2025 [de-identified] : Pt will discuss x1-2 mood-altering events to process symptoms, triggers, early warning signs, and coping skills during therapy sessions every 1-4 weeks to increase insight and self-awareness.  [de-identified] : 9/19/2025 [FreeTextEntry5] : Acceptance and Commitment Therapy, [place] [facility], Motivational Interviewing, Psychoeducation, and Supportive Therapy  [de-identified] : The patient expresses improvement in performing activities of daily living and/or says progress with initial complaint symptoms. In addition, the treatment team will conduct diagnosis-based screenings as needed.  [de-identified] : If the patient is unable to perform activities of daily living and/or expresses suicidal ideation, a higher level of care will be considered. [de-identified] : Other rationales for transition/discharge are granted/applied upon the patient's not communicating with the providers, relocation, self-termination, and/or requests for treatment termination/transfer to another facility.  [de-identified] : Pt declined. [de-identified] : Edwin Gil ("Mag"), Bonnie Obrien

## 2024-09-21 NOTE — PLAN
[[facility]] : [facility]  [Motivational Interviewing] : Motivational Interviewing  [Supportive Therapy] : Supportive Therapy [FreeTextEntry2] : Treatment Goal (effective as of 9/19/24) Ankit will utilize x3 coping skills to regulate their thoughts and emotions to maintain daily functioning.   Objective A. Pt will utilize x1-3 coping skills daily and maintain medication management daily as prescribed to regulate thoughts and emotions effectively.  Objective B. Pt will discuss x1-2 mood-altering events to process symptoms, triggers, early warning signs, and coping skills during therapy sessions every 1-4 weeks to increase insight and self-awareness.  Recommended Frequency of Visits: Medication Management: Every 1-3 months Individual Therapy: Every 1-4 weeks  [de-identified] : Ankit (preferred name) confirmed taking all medications as prescribed and had difficulty attending scheduled appointments due to getting sick and other unexpected personal events. The patient reported feeling physically ill again (will f/u with medical providers),  which frustrated them. They noted an overall controlled mood except "feeling sad, not depressed," but manageable with no significant concerns. They was going through the hiring process for the full-time job offer at [employer] in [place], [place]. As we discussed their treatment needs and plan, Anikt confirmed their plan of settling in [place], [place]. During the discussion, Ankit acknowledged that transferring their care to [facility] would be more beneficial, considering the long commute/transportation problems and easy access to in-person care when needed. They would begin the process after addressing their medication adjustment concerns with Dr. Bennett. We completed the treatment plan during today's session.     Action Plan & Practice Task(s): - Monitor symptoms - Maintain medication management as prescribe - Review the Safety Plan   Support Network (established contact consent/PHI Release): - Collateral & Emergency Contact: Chapo Sheikh, partner, [phone] - Collateral & Emergency Contact: Maxwell Lunaueroa, friend, [phone]  Medical Provider (Pt provided HIPPA Release): - Cardiologist, Dr. Manda Tracey, [phone] - PCP/NP, Amanda Martinez, [phone]     Follow-up: - Return in 2 week(s)

## 2024-09-21 NOTE — RISK ASSESSMENT
[Yes, patient reports ideation or behavior] : Yes, patient reports ideation or behavior [Low acute suicide risk] : Low acute suicide risk [Yes] : Safety Plan completed/updated (for individuals at risk): Yes [No, patient denies ideation or behavior] : No, patient denies ideation or behavior [No] : No [FreeTextEntry8] : Pt had multiple times of s/a, age 16/17 (hang self, self-aborted, [facility]), age 22 (cutting, IPP 2 days after, self-admitted), age [age] (cutting, IPP, self-admitted), hx NSSIB, last incident 6 months (cutting with anything), uses cannabis to manage pain and anxiety. Hx of alcohole abuse, attaining sobriety since 2022. The patient reported worsening mood in July with passive s/i, the safety plan was completed in July 2024, and they was added to the high-risk patient list on 7/18/24 - 8/1/24. Pt denied s/i and denied nssib currently. [FreeTextEntry9] : No acute suicide risk or violence risk. However, Pt had multiple times of s/a, age [age] (hang self, self-aborted, IPP adm/first hospital), age 22 (cutting, IPP 2 days after, self-admitted), age [age] (cutting, IPP, self-admitted), hx NSSIB, last incident 6 months (cutting with anything), uses cannabis to manage pain and anxiety. Hx of alcohole abuse, attaining sobriety since 2022. The patient reported worsening mood in July with passive s/i, the safety plan was completed in July 2024, and they was added to the high-risk patient list on 7/18/24 - 8/1/24. Pt denied s/i and denied nssib currently.

## 2024-09-21 NOTE — PLAN
[[facility]] : [facility]  [Motivational Interviewing] : Motivational Interviewing  [Supportive Therapy] : Supportive Therapy [FreeTextEntry2] : Treatment Goal (effective as of 9/19/24) Ankit will utilize x3 coping skills to regulate their thoughts and emotions to maintain daily functioning.   Objective A. Pt will utilize x1-3 coping skills daily and maintain medication management daily as prescribed to regulate thoughts and emotions effectively.  Objective B. Pt will discuss x1-2 mood-altering events to process symptoms, triggers, early warning signs, and coping skills during therapy sessions every 1-4 weeks to increase insight and self-awareness.  Recommended Frequency of Visits: Medication Management: Every 1-3 months Individual Therapy: Every 1-4 weeks  [de-identified] : Ankit (preferred name) confirmed taking all medications as prescribed and had difficulty attending scheduled appointments due to getting sick and other unexpected personal events. The patient reported feeling physically ill again (will f/u with medical providers),  which frustrated them. They noted an overall controlled mood except "feeling sad, not depressed," but manageable with no significant concerns. They was going through the hiring process for the full-time job offer at [employer] in [place], [place]. As we discussed their treatment needs and plan, Ankit confirmed their plan of settling in [place], [place]. During the discussion, Ankit acknowledged that transferring their care to [facility] would be more beneficial, considering the long commute/transportation problems and easy access to in-person care when needed. They would begin the process after addressing their medication adjustment concerns with Dr. Bennett. We completed the treatment plan during today's session.     Action Plan & Practice Task(s): - Monitor symptoms - Maintain medication management as prescribe - Review the Safety Plan   Support Network (established contact consent/PHI Release): - Collateral & Emergency Contact: Chapo Sheikh, partner, [phone] - Collateral & Emergency Contact: Maxwell Lunaueroa, friend, [phone]  Medical Provider (Pt provided HIPPA Release): - Cardiologist, Dr. Manda Tracey, [phone] - PCP/NP, Amanda Martinez, [phone]     Follow-up: - Return in 2 week(s)

## 2024-09-21 NOTE — REASON FOR VISIT
[Patient preference] : as per patient preference [Access issues (e.g., transportation, impaired mobility, etc.)] : due to patient's access issues [Telehealth (audio & video) - Individual/Group] : This visit was provided via telehealth using real-time 2-way audio visual technology. [Other Location: e.g. Home (Enter Location, City,State)___] : The patient, [unfilled], was located at [unfilled] at the time of the visit. [Verbal consent obtained from patient/other participant(s)] : Verbal consent for telehealth/telephonic services obtained from patient/other participant(s) [Patient] : Patient [FreeTextEntry4] : 9:15 AM [FreeTextEntry5] : 9:50 AM [FreeTextEntry1] : Psychotherapy follow-up visit with the treatment diagnoses of 1) (301.83) (F60.3) Borderline personality disorder 2) (311) (F32.A) Depression 3) (309.81) (F43.10) PTSD (post-traumatic stress disorder) 4) (300.00) (F41.9) Anxiety

## 2024-09-21 NOTE — PLAN
[[facility]] : [facility]  [Motivational Interviewing] : Motivational Interviewing  [Supportive Therapy] : Supportive Therapy [FreeTextEntry2] : Treatment Goal (effective as of 9/19/24) Ankit will utilize x3 coping skills to regulate their thoughts and emotions to maintain daily functioning.   Objective A. Pt will utilize x1-3 coping skills daily and maintain medication management daily as prescribed to regulate thoughts and emotions effectively.  Objective B. Pt will discuss x1-2 mood-altering events to process symptoms, triggers, early warning signs, and coping skills during therapy sessions every 1-4 weeks to increase insight and self-awareness.  Recommended Frequency of Visits: Medication Management: Every 1-3 months Individual Therapy: Every 1-4 weeks  [de-identified] : Ankit (preferred name) confirmed taking all medications as prescribed and had difficulty attending scheduled appointments due to getting sick and other unexpected personal events. The patient reported feeling physically ill again (will f/u with medical providers),  which frustrated them. They noted an overall controlled mood except "feeling sad, not depressed," but manageable with no significant concerns. They was going through the hiring process for the full-time job offer at [employer] in [place], [place]. As we discussed their treatment needs and plan, Ankit confirmed their plan of settling in [place], [place]. During the discussion, Ankit acknowledged that transferring their care to [facility] would be more beneficial, considering the long commute/transportation problems and easy access to in-person care when needed. They would begin the process after addressing their medication adjustment concerns with Dr. Bennett. We completed the treatment plan during today's session.     Action Plan & Practice Task(s): - Monitor symptoms - Maintain medication management as prescribe - Review the Safety Plan   Support Network (established contact consent/PHI Release): - Collateral & Emergency Contact: Chapo Sheikh, partner, [phone] - Collateral & Emergency Contact: Maxwell Lunaueroa, friend, [phone]  Medical Provider (Pt provided HIPPA Release): - Cardiologist, Dr. Manda Tracey, [phone] - PCP/NP, Amanda Martinez, [phone]     Follow-up: - Return in 2 week(s)

## 2024-09-21 NOTE — DISCUSSION/SUMMARY
[Initial Plan] : Initial Plan [Able to set and pursue goals] : able to set and pursue goals [Adherent to treatment recommendations] : adherent to treatment recommendations [Attempting to realize their potential] : Attempting to realize their potential [Cognitively intact] : cognitively intact [Motivated to participate in treatment] : motivated to participate in treatment [Has vocational interests or hobbies] : has vocational interests or hobbies [Involved in cultural/spiritual/Roman Catholic/community activities] : involved in cultural/spiritual/Roman Catholic/community activities [English fluency] : English fluency [Connected to healthcare] : connected to healthcare [Access to safe outdoor spaces] : access to safe outdoor spaces [Social supports] : social supports [FreeTextEntry1] : 9/19/2024 [FreeTextEntry2] : 9/19/2025 [FreeTextEntry3] : 4/2/2020 [FreeTextEntry8] : "My forgetfulness, I get sick easily (but always f/u with the doctors), long commute, etc." Pt is considering to transfer their care to [facility], which is closer to where they mostly stay.  [FreeTextEntry9] : "I practice Jainism" [Mental Health] : Mental Health [Initial] : Initial [every ___ months] : every [unfilled] months [every ___ weeks] : every [unfilled] weeks [Improvement in symptoms as evidenced by:] : Improvement in symptoms as evidenced by: [A change in level of care is needed as evidenced by:] : A change in level of care is needed as evidenced by: [Other rationale for transition/discharge:] : Other rationale for transition/discharge: [Yes] : Yes [None - Reason others did not participate:] : None - Reason others did not participate:  [Psychiatric Provider/Prescriber] : Psychiatric Provider/Prescriber [Therapist] : Therapist [Supervisor (if needed)] : Supervisor [FreeTextEntry4] : 1) Long-term (in patient's words): "I want to feel better." 2) Short-term: Ankit will utilize x3 coping skills to regulate their thoughts and emotions to maintain daily functioning.  [de-identified] : A. Pt will utilize x1-3 coping skills daily and maintain medication management daily as prescribed to regulate thoughts and emotions effectively.  [de-identified] : 9/19/2025 [de-identified] : Pt will discuss x1-2 mood-altering events to process symptoms, triggers, early warning signs, and coping skills during therapy sessions every 1-4 weeks to increase insight and self-awareness.  [de-identified] : 9/19/2025 [FreeTextEntry5] : Acceptance and Commitment Therapy, [place] [facility], Motivational Interviewing, Psychoeducation, and Supportive Therapy  [de-identified] : The patient expresses improvement in performing activities of daily living and/or says progress with initial complaint symptoms. In addition, the treatment team will conduct diagnosis-based screenings as needed.  [de-identified] : If the patient is unable to perform activities of daily living and/or expresses suicidal ideation, a higher level of care will be considered. [de-identified] : Other rationales for transition/discharge are granted/applied upon the patient's not communicating with the providers, relocation, self-termination, and/or requests for treatment termination/transfer to another facility.  [de-identified] : Pt declined. [de-identified] : Edwin Gil ("Mag"), Bonnie Obrien

## 2024-09-21 NOTE — DISCUSSION/SUMMARY
[Initial Plan] : Initial Plan [Able to set and pursue goals] : able to set and pursue goals [Adherent to treatment recommendations] : adherent to treatment recommendations [Attempting to realize their potential] : Attempting to realize their potential [Cognitively intact] : cognitively intact [Motivated to participate in treatment] : motivated to participate in treatment [Has vocational interests or hobbies] : has vocational interests or hobbies [Involved in cultural/spiritual/Latter-day/community activities] : involved in cultural/spiritual/Latter-day/community activities [English fluency] : English fluency [Connected to healthcare] : connected to healthcare [Access to safe outdoor spaces] : access to safe outdoor spaces [Social supports] : social supports [FreeTextEntry1] : 9/19/2024 [FreeTextEntry2] : 9/19/2025 [FreeTextEntry3] : 4/2/2020 [FreeTextEntry8] : "My forgetfulness, I get sick easily (but always f/u with the doctors), long commute, etc." Pt is considering to transfer their care to [facility], which is closer to where they mostly stay.  [FreeTextEntry9] : "I practice Congregational" [Mental Health] : Mental Health [Initial] : Initial [every ___ months] : every [unfilled] months [every ___ weeks] : every [unfilled] weeks [Improvement in symptoms as evidenced by:] : Improvement in symptoms as evidenced by: [A change in level of care is needed as evidenced by:] : A change in level of care is needed as evidenced by: [Other rationale for transition/discharge:] : Other rationale for transition/discharge: [Yes] : Yes [None - Reason others did not participate:] : None - Reason others did not participate:  [Psychiatric Provider/Prescriber] : Psychiatric Provider/Prescriber [Therapist] : Therapist [Supervisor (if needed)] : Supervisor [FreeTextEntry4] : 1) Long-term (in patient's words): "I want to feel better." 2) Short-term: Ankit will utilize x3 coping skills to regulate their thoughts and emotions to maintain daily functioning.  [de-identified] : A. Pt will utilize x1-3 coping skills daily and maintain medication management daily as prescribed to regulate thoughts and emotions effectively.  [de-identified] : 9/19/2025 [de-identified] : Pt will discuss x1-2 mood-altering events to process symptoms, triggers, early warning signs, and coping skills during therapy sessions every 1-4 weeks to increase insight and self-awareness.  [de-identified] : 9/19/2025 [FreeTextEntry5] : Acceptance and Commitment Therapy, [place] [facility], Motivational Interviewing, Psychoeducation, and Supportive Therapy  [de-identified] : The patient expresses improvement in performing activities of daily living and/or says progress with initial complaint symptoms. In addition, the treatment team will conduct diagnosis-based screenings as needed.  [de-identified] : If the patient is unable to perform activities of daily living and/or expresses suicidal ideation, a higher level of care will be considered. [de-identified] : Other rationales for transition/discharge are granted/applied upon the patient's not communicating with the providers, relocation, self-termination, and/or requests for treatment termination/transfer to another facility.  [de-identified] : Pt declined. [de-identified] : Edwin Gil ("Mag"), Bonnie Obrien

## 2024-09-21 NOTE — ADDENDUM
[FreeTextEntry1] : *The patient is new to the therapist, and we completed the initial treatment plan.  *The treatment plan discussion was delayed due to discussing the safety plan and multiple session cancellation by the patients.   The patient's preference for treatment visit types: [ ] In-person  [ ] Telehealth [ x] Hybrid

## 2024-09-21 NOTE — PLAN
[[facility]] : [facility]  [Motivational Interviewing] : Motivational Interviewing  [Supportive Therapy] : Supportive Therapy [FreeTextEntry2] : Treatment Goal (effective as of 9/19/24) Ankit will utilize x3 coping skills to regulate their thoughts and emotions to maintain daily functioning.   Objective A. Pt will utilize x1-3 coping skills daily and maintain medication management daily as prescribed to regulate thoughts and emotions effectively.  Objective B. Pt will discuss x1-2 mood-altering events to process symptoms, triggers, early warning signs, and coping skills during therapy sessions every 1-4 weeks to increase insight and self-awareness.  Recommended Frequency of Visits: Medication Management: Every 1-3 months Individual Therapy: Every 1-4 weeks  [de-identified] : Ankit (preferred name) confirmed taking all medications as prescribed and had difficulty attending scheduled appointments due to getting sick and other unexpected personal events. The patient reported feeling physically ill again (will f/u with medical providers),  which frustrated them. They noted an overall controlled mood except "feeling sad, not depressed," but manageable with no significant concerns. They was going through the hiring process for the full-time job offer at [employer] in [place], [place]. As we discussed their treatment needs and plan, Ankit confirmed their plan of settling in [place], [place]. During the discussion, Ankit acknowledged that transferring their care to [facility] would be more beneficial, considering the long commute/transportation problems and easy access to in-person care when needed. They would begin the process after addressing their medication adjustment concerns with Dr. Bennett. We completed the treatment plan during today's session.     Action Plan & Practice Task(s): - Monitor symptoms - Maintain medication management as prescribe - Review the Safety Plan   Support Network (established contact consent/PHI Release): - Collateral & Emergency Contact: Chapo Sheikh, partner, [phone] - Collateral & Emergency Contact: Maxwell Lunaueroa, friend, [phone]  Medical Provider (Pt provided HIPPA Release): - Cardiologist, Dr. Manda Tracey, [phone] - PCP/NP, Amanda Martinez, [phone]     Follow-up: - Return in 2 week(s)

## 2024-09-21 NOTE — DISCUSSION/SUMMARY
[Initial Plan] : Initial Plan [Able to set and pursue goals] : able to set and pursue goals [Adherent to treatment recommendations] : adherent to treatment recommendations [Attempting to realize their potential] : Attempting to realize their potential [Cognitively intact] : cognitively intact [Motivated to participate in treatment] : motivated to participate in treatment [Has vocational interests or hobbies] : has vocational interests or hobbies [Involved in cultural/spiritual/Methodist/community activities] : involved in cultural/spiritual/Methodist/community activities [English fluency] : English fluency [Connected to healthcare] : connected to healthcare [Access to safe outdoor spaces] : access to safe outdoor spaces [Social supports] : social supports [FreeTextEntry1] : 9/19/2024 [FreeTextEntry2] : 9/19/2025 [FreeTextEntry3] : 4/2/2020 [FreeTextEntry8] : "My forgetfulness, I get sick easily (but always f/u with the doctors), long commute, etc." Pt is considering to transfer their care to [facility], which is closer to where they mostly stay.  [FreeTextEntry9] : "I practice Yarsani" [Mental Health] : Mental Health [Initial] : Initial [every ___ months] : every [unfilled] months [every ___ weeks] : every [unfilled] weeks [Improvement in symptoms as evidenced by:] : Improvement in symptoms as evidenced by: [A change in level of care is needed as evidenced by:] : A change in level of care is needed as evidenced by: [Other rationale for transition/discharge:] : Other rationale for transition/discharge: [Yes] : Yes [None - Reason others did not participate:] : None - Reason others did not participate:  [Psychiatric Provider/Prescriber] : Psychiatric Provider/Prescriber [Therapist] : Therapist [Supervisor (if needed)] : Supervisor [FreeTextEntry4] : 1) Long-term (in patient's words): "I want to feel better." 2) Short-term: Ankit will utilize x3 coping skills to regulate their thoughts and emotions to maintain daily functioning.  [de-identified] : A. Pt will utilize x1-3 coping skills daily and maintain medication management daily as prescribed to regulate thoughts and emotions effectively.  [de-identified] : 9/19/2025 [de-identified] : Pt will discuss x1-2 mood-altering events to process symptoms, triggers, early warning signs, and coping skills during therapy sessions every 1-4 weeks to increase insight and self-awareness.  [de-identified] : 9/19/2025 [FreeTextEntry5] : Acceptance and Commitment Therapy, [place] [facility], Motivational Interviewing, Psychoeducation, and Supportive Therapy  [de-identified] : The patient expresses improvement in performing activities of daily living and/or says progress with initial complaint symptoms. In addition, the treatment team will conduct diagnosis-based screenings as needed.  [de-identified] : If the patient is unable to perform activities of daily living and/or expresses suicidal ideation, a higher level of care will be considered. [de-identified] : Other rationales for transition/discharge are granted/applied upon the patient's not communicating with the providers, relocation, self-termination, and/or requests for treatment termination/transfer to another facility.  [de-identified] : Pt declined. [de-identified] : Edwin Gil ("Mag"), Bonnie Obrien

## 2024-09-21 NOTE — PLAN
[[place]] : [facility]  [Motivational Interviewing] : Motivational Interviewing  [Supportive Therapy] : Supportive Therapy [FreeTextEntry2] : Treatment Goal (effective as of 9/19/24) Ankit will utilize x3 coping skills to regulate their thoughts and emotions to maintain daily functioning.   Objective A. Pt will utilize x1-3 coping skills daily and maintain medication management daily as prescribed to regulate thoughts and emotions effectively.  Objective B. Pt will discuss x1-2 mood-altering events to process symptoms, triggers, early warning signs, and coping skills during therapy sessions every 1-4 weeks to increase insight and self-awareness.  Recommended Frequency of Visits: Medication Management: Every 1-3 months Individual Therapy: Every 1-4 weeks  [de-identified] : Ankit (preferred name) confirmed taking all medications as prescribed and had difficulty attending scheduled appointments due to getting sick and other unexpected personal events. The patient reported feeling physically ill again (will f/u with medical providers),  which frustrated them. They noted an overall controlled mood except "feeling sad, not depressed," but manageable with no significant concerns. They was going through the hiring process for the full-time job offer at [employer] in [place], [place]. As we discussed their treatment needs and plan, Ankit confirmed their plan of settling in [place], [place]. During the discussion, Ankit acknowledged that transferring their care to [facility] would be more beneficial, considering the long commute/transportation problems and easy access to in-person care when needed. They would begin the process after addressing their medication adjustment concerns with Dr. Bennett. We completed the treatment plan during today's session.     Action Plan & Practice Task(s): - Monitor symptoms - Maintain medication management as prescribe - Review the Safety Plan   Support Network (established contact consent/PHI Release): - Collateral & Emergency Contact: Chapo Sheikh, partner, [phone] - Collateral & Emergency Contact: Maxwell Lunaueroa, friend, [phone]  Medical Provider (Pt provided HIPPA Release): - Cardiologist, Dr. Manda Tracey, [phone] - PCP/NP, Amanda Martinez, [phone]     Follow-up: - Return in 2 week(s)

## 2024-09-21 NOTE — DISCUSSION/SUMMARY
[Initial Plan] : Initial Plan [Able to set and pursue goals] : able to set and pursue goals [Adherent to treatment recommendations] : adherent to treatment recommendations [Attempting to realize their potential] : Attempting to realize their potential [Cognitively intact] : cognitively intact [Motivated to participate in treatment] : motivated to participate in treatment [Has vocational interests or hobbies] : has vocational interests or hobbies [Involved in cultural/spiritual/Roman Catholic/community activities] : involved in cultural/spiritual/Roman Catholic/community activities [English fluency] : English fluency [Connected to healthcare] : connected to healthcare [Access to safe outdoor spaces] : access to safe outdoor spaces [Social supports] : social supports [FreeTextEntry1] : 9/19/2024 [FreeTextEntry2] : 9/19/2025 [FreeTextEntry3] : 4/2/2020 [FreeTextEntry8] : "My forgetfulness, I get sick easily (but always f/u with the doctors), long commute, etc." Pt is considering to transfer their care to [facility], which is closer to where they mostly stay.  [FreeTextEntry9] : "I practice Restorationism" [Mental Health] : Mental Health [Initial] : Initial [every ___ months] : every [unfilled] months [every ___ weeks] : every [unfilled] weeks [Improvement in symptoms as evidenced by:] : Improvement in symptoms as evidenced by: [A change in level of care is needed as evidenced by:] : A change in level of care is needed as evidenced by: [Other rationale for transition/discharge:] : Other rationale for transition/discharge: [Yes] : Yes [None - Reason others did not participate:] : None - Reason others did not participate:  [Psychiatric Provider/Prescriber] : Psychiatric Provider/Prescriber [Therapist] : Therapist [Supervisor (if needed)] : Supervisor [FreeTextEntry4] : 1) Long-term (in patient's words): "I want to feel better." 2) Short-term: Ankit will utilize x3 coping skills to regulate their thoughts and emotions to maintain daily functioning.  [de-identified] : A. Pt will utilize x1-3 coping skills daily and maintain medication management daily as prescribed to regulate thoughts and emotions effectively.  [de-identified] : 9/19/2025 [de-identified] : Pt will discuss x1-2 mood-altering events to process symptoms, triggers, early warning signs, and coping skills during therapy sessions every 1-4 weeks to increase insight and self-awareness.  [de-identified] : 9/19/2025 [FreeTextEntry5] : Acceptance and Commitment Therapy, [place] [facility], Motivational Interviewing, Psychoeducation, and Supportive Therapy  [de-identified] : The patient expresses improvement in performing activities of daily living and/or says progress with initial complaint symptoms. In addition, the treatment team will conduct diagnosis-based screenings as needed.  [de-identified] : If the patient is unable to perform activities of daily living and/or expresses suicidal ideation, a higher level of care will be considered. [de-identified] : Other rationales for transition/discharge are granted/applied upon the patient's not communicating with the providers, relocation, self-termination, and/or requests for treatment termination/transfer to another facility.  [de-identified] : Pt declined. [de-identified] : Edwin Gil ("Mag"), Bonnie Obrien

## 2024-09-21 NOTE — DISCUSSION/SUMMARY
[Initial Plan] : Initial Plan [Able to set and pursue goals] : able to set and pursue goals [Adherent to treatment recommendations] : adherent to treatment recommendations [Attempting to realize their potential] : Attempting to realize their potential [Cognitively intact] : cognitively intact [Motivated to participate in treatment] : motivated to participate in treatment [Has vocational interests or hobbies] : has vocational interests or hobbies [Involved in cultural/spiritual/Church/community activities] : involved in cultural/spiritual/Church/community activities [English fluency] : English fluency [Connected to healthcare] : connected to healthcare [Access to safe outdoor spaces] : access to safe outdoor spaces [Social supports] : social supports [FreeTextEntry1] : 9/19/2024 [FreeTextEntry2] : 9/19/2025 [FreeTextEntry3] : 4/2/2020 [FreeTextEntry8] : "My forgetfulness, I get sick easily (but always f/u with the doctors), long commute, etc." Pt is considering to transfer their care to [facility], which is closer to where they mostly stay.  [FreeTextEntry9] : "I practice Judaism" [Mental Health] : Mental Health [Initial] : Initial [every ___ months] : every [unfilled] months [every ___ weeks] : every [unfilled] weeks [Improvement in symptoms as evidenced by:] : Improvement in symptoms as evidenced by: [A change in level of care is needed as evidenced by:] : A change in level of care is needed as evidenced by: [Other rationale for transition/discharge:] : Other rationale for transition/discharge: [Yes] : Yes [None - Reason others did not participate:] : None - Reason others did not participate:  [Psychiatric Provider/Prescriber] : Psychiatric Provider/Prescriber [Therapist] : Therapist [Supervisor (if needed)] : Supervisor [FreeTextEntry4] : 1) Long-term (in patient's words): "I want to feel better." 2) Short-term: Ankit will utilize x3 coping skills to regulate their thoughts and emotions to maintain daily functioning.  [de-identified] : A. Pt will utilize x1-3 coping skills daily and maintain medication management daily as prescribed to regulate thoughts and emotions effectively.  [de-identified] : 9/19/2025 [de-identified] : Pt will discuss x1-2 mood-altering events to process symptoms, triggers, early warning signs, and coping skills during therapy sessions every 1-4 weeks to increase insight and self-awareness.  [de-identified] : 9/19/2025 [FreeTextEntry5] : Acceptance and Commitment Therapy, [place] [facility], Motivational Interviewing, Psychoeducation, and Supportive Therapy  [de-identified] : The patient expresses improvement in performing activities of daily living and/or says progress with initial complaint symptoms. In addition, the treatment team will conduct diagnosis-based screenings as needed.  [de-identified] : If the patient is unable to perform activities of daily living and/or expresses suicidal ideation, a higher level of care will be considered. [de-identified] : Other rationales for transition/discharge are granted/applied upon the patient's not communicating with the providers, relocation, self-termination, and/or requests for treatment termination/transfer to another facility.  [de-identified] : Pt declined. [de-identified] : Edwin Gil ("Mag"), Bonnie Obrien

## 2024-09-21 NOTE — PLAN
[[place] [facility]] : [facility]  [Motivational Interviewing] : Motivational Interviewing  [Supportive Therapy] : Supportive Therapy [FreeTextEntry2] : Treatment Goal (effective as of 9/19/24) Ankit will utilize x3 coping skills to regulate their thoughts and emotions to maintain daily functioning.   Objective A. Pt will utilize x1-3 coping skills daily and maintain medication management daily as prescribed to regulate thoughts and emotions effectively.  Objective B. Pt will discuss x1-2 mood-altering events to process symptoms, triggers, early warning signs, and coping skills during therapy sessions every 1-4 weeks to increase insight and self-awareness.  Recommended Frequency of Visits: Medication Management: Every 1-3 months Individual Therapy: Every 1-4 weeks  [de-identified] : Ankit (preferred name) confirmed taking all medications as prescribed and had difficulty attending scheduled appointments due to getting sick and other unexpected personal events. The patient reported feeling physically ill again (will f/u with medical providers),  which frustrated them. They noted an overall controlled mood except "feeling sad, not depressed," but manageable with no significant concerns. They was going through the hiring process for the full-time job offer at [employer] in [place], [place]. As we discussed their treatment needs and plan, Ankit confirmed their plan of settling in [place], [place]. During the discussion, Ankit acknowledged that transferring their care to [facility] would be more beneficial, considering the long commute/transportation problems and easy access to in-person care when needed. They would begin the process after addressing their medication adjustment concerns with Dr. Bennett. We completed the treatment plan during today's session.     Action Plan & Practice Task(s): - Monitor symptoms - Maintain medication management as prescribe - Review the Safety Plan   Support Network (established contact consent/PHI Release): - Collateral & Emergency Contact: Chapo Sheikh, partner, [phone] - Collateral & Emergency Contact: Maxwell Lunaueroa, friend, [phone]  Medical Provider (Pt provided HIPPA Release): - Cardiologist, Dr. Manda Tracey, [phone] - PCP/NP, Amanda Martinez, [phone]     Follow-up: - Return in 2 week(s)

## 2024-10-01 ENCOUNTER — APPOINTMENT (OUTPATIENT)
Dept: PSYCHIATRY | Facility: CLINIC | Age: 29
End: 2024-10-01

## 2024-10-09 ENCOUNTER — APPOINTMENT (OUTPATIENT)
Dept: PSYCHIATRY | Facility: CLINIC | Age: 29
End: 2024-10-09

## 2024-10-15 ENCOUNTER — OUTPATIENT (OUTPATIENT)
Dept: OUTPATIENT SERVICES | Facility: HOSPITAL | Age: 29
LOS: 1 days | End: 2024-10-15
Payer: MEDICAID

## 2024-10-15 ENCOUNTER — APPOINTMENT (OUTPATIENT)
Dept: PSYCHIATRY | Facility: CLINIC | Age: 29
End: 2024-10-15
Payer: MEDICAID

## 2024-10-15 DIAGNOSIS — F41.9 ANXIETY DISORDER, UNSPECIFIED: ICD-10-CM

## 2024-10-15 DIAGNOSIS — F43.10 POST-TRAUMATIC STRESS DISORDER, UNSPECIFIED: ICD-10-CM

## 2024-10-15 DIAGNOSIS — F60.3 BORDERLINE PERSONALITY DISORDER: ICD-10-CM

## 2024-10-15 DIAGNOSIS — F32.A DEPRESSION, UNSPECIFIED: ICD-10-CM

## 2024-10-15 PROCEDURE — ZZZZZ: CPT

## 2024-10-15 PROCEDURE — 99212 OFFICE O/P EST SF 10 MIN: CPT | Mod: 95

## 2024-10-16 ENCOUNTER — APPOINTMENT (OUTPATIENT)
Dept: PSYCHIATRY | Facility: CLINIC | Age: 29
End: 2024-10-16

## 2024-10-16 DIAGNOSIS — F60.3 BORDERLINE PERSONALITY DISORDER: ICD-10-CM

## 2024-10-16 DIAGNOSIS — F32.A DEPRESSION, UNSPECIFIED: ICD-10-CM

## 2024-10-18 ENCOUNTER — NON-APPOINTMENT (OUTPATIENT)
Age: 29
End: 2024-10-18

## 2024-10-29 ENCOUNTER — APPOINTMENT (OUTPATIENT)
Dept: CARDIOLOGY | Facility: CLINIC | Age: 29
End: 2024-10-29

## 2024-10-31 ENCOUNTER — NON-APPOINTMENT (OUTPATIENT)
Age: 29
End: 2024-10-31

## 2024-11-05 ENCOUNTER — OUTPATIENT (OUTPATIENT)
Dept: OUTPATIENT SERVICES | Facility: HOSPITAL | Age: 29
LOS: 1 days | End: 2024-11-05
Payer: MEDICAID

## 2024-11-05 ENCOUNTER — APPOINTMENT (OUTPATIENT)
Dept: PSYCHIATRY | Facility: CLINIC | Age: 29
End: 2024-11-05

## 2024-11-05 DIAGNOSIS — F60.3 BORDERLINE PERSONALITY DISORDER: ICD-10-CM

## 2024-11-05 DIAGNOSIS — F32.A DEPRESSION, UNSPECIFIED: ICD-10-CM

## 2024-11-05 PROCEDURE — 90834 PSYTX W PT 45 MINUTES: CPT

## 2024-11-06 DIAGNOSIS — F32.A DEPRESSION, UNSPECIFIED: ICD-10-CM

## 2024-11-06 DIAGNOSIS — F60.3 BORDERLINE PERSONALITY DISORDER: ICD-10-CM

## 2024-11-06 DIAGNOSIS — F41.9 ANXIETY DISORDER, UNSPECIFIED: ICD-10-CM

## 2024-11-14 ENCOUNTER — APPOINTMENT (OUTPATIENT)
Dept: PSYCHIATRY | Facility: CLINIC | Age: 29
End: 2024-11-14

## 2024-11-19 ENCOUNTER — EMERGENCY (EMERGENCY)
Facility: HOSPITAL | Age: 29
LOS: 1 days | Discharge: HOME | End: 2024-11-19
Attending: EMERGENCY MEDICINE
Payer: MEDICAID

## 2024-11-19 ENCOUNTER — APPOINTMENT (OUTPATIENT)
Dept: PSYCHIATRY | Facility: CLINIC | Age: 29
End: 2024-11-19

## 2024-11-19 VITALS
HEIGHT: 59 IN | RESPIRATION RATE: 16 BRPM | OXYGEN SATURATION: 97 % | HEART RATE: 68 BPM | TEMPERATURE: 98 F | WEIGHT: 169.98 LBS | DIASTOLIC BLOOD PRESSURE: 67 MMHG | SYSTOLIC BLOOD PRESSURE: 114 MMHG

## 2024-11-19 LAB
ALBUMIN SERPL ELPH-MCNC: 4.2 G/DL — SIGNIFICANT CHANGE UP (ref 3.5–5)
ALP SERPL-CCNC: 103 U/L — SIGNIFICANT CHANGE UP (ref 40–120)
ALT FLD-CCNC: 19 U/L DA — SIGNIFICANT CHANGE UP (ref 10–60)
ANION GAP SERPL CALC-SCNC: 4 MMOL/L — LOW (ref 5–17)
AST SERPL-CCNC: 9 U/L — LOW (ref 10–40)
BASOPHILS # BLD AUTO: 0.06 K/UL — SIGNIFICANT CHANGE UP (ref 0–0.2)
BASOPHILS NFR BLD AUTO: 0.5 % — SIGNIFICANT CHANGE UP (ref 0–2)
BILIRUB SERPL-MCNC: 0.2 MG/DL — SIGNIFICANT CHANGE UP (ref 0.2–1.2)
BUN SERPL-MCNC: 13 MG/DL — SIGNIFICANT CHANGE UP (ref 7–18)
CALCIUM SERPL-MCNC: 9.5 MG/DL — SIGNIFICANT CHANGE UP (ref 8.4–10.5)
CHLORIDE SERPL-SCNC: 107 MMOL/L — SIGNIFICANT CHANGE UP (ref 96–108)
CO2 SERPL-SCNC: 27 MMOL/L — SIGNIFICANT CHANGE UP (ref 22–31)
CREAT SERPL-MCNC: 1.21 MG/DL — SIGNIFICANT CHANGE UP (ref 0.5–1.3)
EGFR: 61 ML/MIN/1.73M2 — SIGNIFICANT CHANGE UP
EOSINOPHIL # BLD AUTO: 0.39 K/UL — SIGNIFICANT CHANGE UP (ref 0–0.5)
EOSINOPHIL NFR BLD AUTO: 3.4 % — SIGNIFICANT CHANGE UP (ref 0–6)
GLUCOSE SERPL-MCNC: 114 MG/DL — HIGH (ref 70–99)
HCT VFR BLD CALC: 44.6 % — SIGNIFICANT CHANGE UP (ref 34.5–45)
HGB BLD-MCNC: 14.9 G/DL — SIGNIFICANT CHANGE UP (ref 11.5–15.5)
IMM GRANULOCYTES NFR BLD AUTO: 0.3 % — SIGNIFICANT CHANGE UP (ref 0–0.9)
LYMPHOCYTES # BLD AUTO: 19.1 % — SIGNIFICANT CHANGE UP (ref 13–44)
LYMPHOCYTES # BLD AUTO: 2.16 K/UL — SIGNIFICANT CHANGE UP (ref 1–3.3)
MAGNESIUM SERPL-MCNC: 1.8 MG/DL — SIGNIFICANT CHANGE UP (ref 1.6–2.6)
MCHC RBC-ENTMCNC: 27.6 PG — SIGNIFICANT CHANGE UP (ref 27–34)
MCHC RBC-ENTMCNC: 33.4 G/DL — SIGNIFICANT CHANGE UP (ref 32–36)
MCV RBC AUTO: 82.7 FL — SIGNIFICANT CHANGE UP (ref 80–100)
MONOCYTES # BLD AUTO: 0.62 K/UL — SIGNIFICANT CHANGE UP (ref 0–0.9)
MONOCYTES NFR BLD AUTO: 5.5 % — SIGNIFICANT CHANGE UP (ref 2–14)
NEUTROPHILS # BLD AUTO: 8.06 K/UL — HIGH (ref 1.8–7.4)
NEUTROPHILS NFR BLD AUTO: 71.2 % — SIGNIFICANT CHANGE UP (ref 43–77)
NRBC # BLD: 0 /100 WBCS — SIGNIFICANT CHANGE UP (ref 0–0)
PLATELET # BLD AUTO: 340 K/UL — SIGNIFICANT CHANGE UP (ref 150–400)
POTASSIUM SERPL-MCNC: 4 MMOL/L — SIGNIFICANT CHANGE UP (ref 3.5–5.3)
POTASSIUM SERPL-SCNC: 4 MMOL/L — SIGNIFICANT CHANGE UP (ref 3.5–5.3)
PROT SERPL-MCNC: 8.2 G/DL — SIGNIFICANT CHANGE UP (ref 6–8.3)
RBC # BLD: 5.39 M/UL — HIGH (ref 3.8–5.2)
RBC # FLD: 12.6 % — SIGNIFICANT CHANGE UP (ref 10.3–14.5)
SODIUM SERPL-SCNC: 138 MMOL/L — SIGNIFICANT CHANGE UP (ref 135–145)
WBC # BLD: 11.32 K/UL — HIGH (ref 3.8–10.5)
WBC # FLD AUTO: 11.32 K/UL — HIGH (ref 3.8–10.5)

## 2024-11-19 PROCEDURE — 85025 COMPLETE CBC W/AUTO DIFF WBC: CPT

## 2024-11-19 PROCEDURE — 93005 ELECTROCARDIOGRAM TRACING: CPT

## 2024-11-19 PROCEDURE — 36415 COLL VENOUS BLD VENIPUNCTURE: CPT

## 2024-11-19 PROCEDURE — 96374 THER/PROPH/DIAG INJ IV PUSH: CPT

## 2024-11-19 PROCEDURE — 99284 EMERGENCY DEPT VISIT MOD MDM: CPT | Mod: 25

## 2024-11-19 PROCEDURE — 83735 ASSAY OF MAGNESIUM: CPT

## 2024-11-19 PROCEDURE — 82962 GLUCOSE BLOOD TEST: CPT

## 2024-11-19 PROCEDURE — 99284 EMERGENCY DEPT VISIT MOD MDM: CPT

## 2024-11-19 PROCEDURE — 80053 COMPREHEN METABOLIC PANEL: CPT

## 2024-11-19 PROCEDURE — 96375 TX/PRO/DX INJ NEW DRUG ADDON: CPT

## 2024-11-19 RX ORDER — LORAZEPAM 2 MG
0.5 TABLET ORAL ONCE
Refills: 0 | Status: DISCONTINUED | OUTPATIENT
Start: 2024-11-19 | End: 2024-11-19

## 2024-11-19 RX ORDER — LORAZEPAM 2 MG
1 TABLET ORAL
Qty: 6 | Refills: 0
Start: 2024-11-19 | End: 2024-11-20

## 2024-11-19 RX ORDER — SODIUM CHLORIDE 9 MG/ML
1000 INJECTION, SOLUTION INTRAMUSCULAR; INTRAVENOUS; SUBCUTANEOUS ONCE
Refills: 0 | Status: COMPLETED | OUTPATIENT
Start: 2024-11-19 | End: 2024-11-19

## 2024-11-19 RX ORDER — KETOROLAC TROMETHAMINE 30 MG/ML
15 INJECTION INTRAMUSCULAR; INTRAVENOUS ONCE
Refills: 0 | Status: DISCONTINUED | OUTPATIENT
Start: 2024-11-19 | End: 2024-11-19

## 2024-11-19 RX ADMIN — KETOROLAC TROMETHAMINE 15 MILLIGRAM(S): 30 INJECTION INTRAMUSCULAR; INTRAVENOUS at 19:52

## 2024-11-19 RX ADMIN — Medication 0.5 MILLIGRAM(S): at 19:52

## 2024-11-19 RX ADMIN — SODIUM CHLORIDE 1000 MILLILITER(S): 9 INJECTION, SOLUTION INTRAMUSCULAR; INTRAVENOUS; SUBCUTANEOUS at 19:47

## 2024-11-19 NOTE — ED ADULT NURSE NOTE - NSFALLUNIVINTERV_ED_ALL_ED
Bed/Stretcher in lowest position, wheels locked, appropriate side rails in place/Call bell, personal items and telephone in reach/Instruct patient to call for assistance before getting out of bed/chair/stretcher/Non-slip footwear applied when patient is off stretcher/[place] to call system/Physically safe environment - no spills, clutter or unnecessary equipment/Purposeful proactive rounding/Room/bathroom lighting operational, light cord in reach

## 2024-11-19 NOTE — ED PROVIDER NOTE - CLINICAL SUMMARY MEDICAL DECISION MAKING FREE TEXT BOX
29-year-old female with diabetes presents with pain and tingling in bilateral lower extremities.  Laboratory significant for slightly elevated white blood cell count.  Otherwise unremarkable.  Patient received fluids, NSAIDs and Ativan with good relief of pain.  Will prescribe several tablets of Ativan and have patient follow-up with her doctor as well as neurology, information provided.

## 2024-11-19 NOTE — ED PROVIDER NOTE - OBJECTIVE STATEMENT
29-year-old female with past medical history of diabetes presents with bilateral lower extremity pain, tingling.  Currently on gabapentin.

## 2024-11-19 NOTE — ED PROVIDER NOTE - PHYSICAL EXAMINATION
General: Patient well appearing, vital signs within normal limits  HEENT: MMM, trachea midline  Respiratory: No respiratory distress  Neuro: Moves all extremities  Skin: No rashes or lesions

## 2024-11-19 NOTE — ED ADULT NURSE NOTE - OBJECTIVE STATEMENT
BIBA for c/o  bilateral leg numbness /leg pain  x  3   months  getting  worst. Ambulate with steady gait

## 2024-11-19 NOTE — ED PROVIDER NOTE - NSFOLLOWUPCLINICS_GEN_ALL_ED_FT
[facility]  Neurology  [address] [place] [address]  Phone: [phone]  Fax: [phone]

## 2024-11-19 NOTE — ED PROVIDER NOTE - NSFOLLOWUPINSTRUCTIONS_ED_ALL_ED_FT
You presented with pain and tingling of bilateral lower extremities.  This is likely secondary to your diabetes.  Please keep this well-controlled.  Use medications as prescribed.  Follow-up with your primary care physician and/or neurology.  Information provided below.

## 2024-11-19 NOTE — ED PROVIDER NOTE - PATIENT PORTAL LINK FT
You can access the FollowMyHealth Patient Portal offered by [facility] by registering at the following website: [email or web address]. By joining [employer]’s FollowMyHealth portal, you will also be able to view your health information using other applications (apps) compatible with our system.

## 2024-11-29 ENCOUNTER — APPOINTMENT (OUTPATIENT)
Dept: PSYCHIATRY | Facility: CLINIC | Age: 29
End: 2024-11-29
Payer: MEDICAID

## 2024-11-29 ENCOUNTER — OUTPATIENT (OUTPATIENT)
Dept: OUTPATIENT SERVICES | Facility: HOSPITAL | Age: 29
LOS: 1 days | End: 2024-11-29
Payer: MEDICAID

## 2024-11-29 DIAGNOSIS — F32.A DEPRESSION, UNSPECIFIED: ICD-10-CM

## 2024-11-29 DIAGNOSIS — F60.3 BORDERLINE PERSONALITY DISORDER: ICD-10-CM

## 2024-11-29 DIAGNOSIS — F43.10 POST-TRAUMATIC STRESS DISORDER, UNSPECIFIED: ICD-10-CM

## 2024-11-29 DIAGNOSIS — F41.9 ANXIETY DISORDER, UNSPECIFIED: ICD-10-CM

## 2024-11-29 PROCEDURE — ZZZZZ: CPT

## 2024-11-29 PROCEDURE — 99213 OFFICE O/P EST LOW 20 MIN: CPT | Mod: 95

## 2024-11-30 DIAGNOSIS — F32.A DEPRESSION, UNSPECIFIED: ICD-10-CM

## 2024-11-30 DIAGNOSIS — F60.3 BORDERLINE PERSONALITY DISORDER: ICD-10-CM

## 2024-12-03 ENCOUNTER — OUTPATIENT (OUTPATIENT)
Dept: OUTPATIENT SERVICES | Facility: HOSPITAL | Age: 29
LOS: 1 days | End: 2024-12-03
Payer: MEDICAID

## 2024-12-03 ENCOUNTER — APPOINTMENT (OUTPATIENT)
Dept: PSYCHIATRY | Facility: CLINIC | Age: 29
End: 2024-12-03

## 2024-12-03 DIAGNOSIS — F43.10 POST-TRAUMATIC STRESS DISORDER, UNSPECIFIED: ICD-10-CM

## 2024-12-03 DIAGNOSIS — F32.A DEPRESSION, UNSPECIFIED: ICD-10-CM

## 2024-12-03 DIAGNOSIS — F60.3 BORDERLINE PERSONALITY DISORDER: ICD-10-CM

## 2024-12-03 PROCEDURE — 90832 PSYTX W PT 30 MINUTES: CPT

## 2024-12-04 DIAGNOSIS — F60.3 BORDERLINE PERSONALITY DISORDER: ICD-10-CM

## 2024-12-04 DIAGNOSIS — F41.9 ANXIETY DISORDER, UNSPECIFIED: ICD-10-CM

## 2024-12-04 DIAGNOSIS — F32.A DEPRESSION, UNSPECIFIED: ICD-10-CM

## 2024-12-04 DIAGNOSIS — F43.10 POST-TRAUMATIC STRESS DISORDER, UNSPECIFIED: ICD-10-CM

## 2024-12-10 ENCOUNTER — EMERGENCY (EMERGENCY)
Facility: HOSPITAL | Age: 29
LOS: 1 days | Discharge: HOME | End: 2024-12-10
Attending: EMERGENCY MEDICINE
Payer: MEDICAID

## 2024-12-10 VITALS
HEIGHT: 59 IN | HEART RATE: 62 BPM | WEIGHT: 175.05 LBS | RESPIRATION RATE: 17 BRPM | DIASTOLIC BLOOD PRESSURE: 62 MMHG | OXYGEN SATURATION: 99 % | TEMPERATURE: 98 F | SYSTOLIC BLOOD PRESSURE: 97 MMHG

## 2024-12-10 VITALS
HEART RATE: 67 BPM | DIASTOLIC BLOOD PRESSURE: 68 MMHG | SYSTOLIC BLOOD PRESSURE: 104 MMHG | TEMPERATURE: 99 F | RESPIRATION RATE: 18 BRPM | OXYGEN SATURATION: 98 %

## 2024-12-10 LAB
ALBUMIN SERPL ELPH-MCNC: 3.5 G/DL — SIGNIFICANT CHANGE UP (ref 3.5–5)
ALP SERPL-CCNC: 89 U/L — SIGNIFICANT CHANGE UP (ref 40–120)
ALT FLD-CCNC: 17 U/L DA — SIGNIFICANT CHANGE UP (ref 10–60)
ANION GAP SERPL CALC-SCNC: 3 MMOL/L — LOW (ref 5–17)
APPEARANCE UR: CLEAR — SIGNIFICANT CHANGE UP
AST SERPL-CCNC: 12 U/L — SIGNIFICANT CHANGE UP (ref 10–40)
BACTERIA # UR AUTO: ABNORMAL /HPF
BASOPHILS # BLD AUTO: 0.05 K/UL — SIGNIFICANT CHANGE UP (ref 0–0.2)
BASOPHILS NFR BLD AUTO: 0.5 % — SIGNIFICANT CHANGE UP (ref 0–2)
BILIRUB SERPL-MCNC: 0.2 MG/DL — SIGNIFICANT CHANGE UP (ref 0.2–1.2)
BILIRUB UR-MCNC: NEGATIVE — SIGNIFICANT CHANGE UP
BUN SERPL-MCNC: 9 MG/DL — SIGNIFICANT CHANGE UP (ref 7–18)
CALCIUM SERPL-MCNC: 8.6 MG/DL — SIGNIFICANT CHANGE UP (ref 8.4–10.5)
CHLORIDE SERPL-SCNC: 107 MMOL/L — SIGNIFICANT CHANGE UP (ref 96–108)
CO2 SERPL-SCNC: 27 MMOL/L — SIGNIFICANT CHANGE UP (ref 22–31)
COLOR SPEC: YELLOW — SIGNIFICANT CHANGE UP
CREAT SERPL-MCNC: 0.9 MG/DL — SIGNIFICANT CHANGE UP (ref 0.5–1.3)
DIFF PNL FLD: NEGATIVE — SIGNIFICANT CHANGE UP
EGFR: 88 ML/MIN/1.73M2 — SIGNIFICANT CHANGE UP
EOSINOPHIL # BLD AUTO: 0.41 K/UL — SIGNIFICANT CHANGE UP (ref 0–0.5)
EOSINOPHIL NFR BLD AUTO: 4.1 % — SIGNIFICANT CHANGE UP (ref 0–6)
EPI CELLS # UR: PRESENT
GLUCOSE SERPL-MCNC: 101 MG/DL — HIGH (ref 70–99)
GLUCOSE UR QL: NEGATIVE MG/DL — SIGNIFICANT CHANGE UP
HCG SERPL-ACNC: <1 MIU/ML — SIGNIFICANT CHANGE UP
HCT VFR BLD CALC: 39.1 % — SIGNIFICANT CHANGE UP (ref 34.5–45)
HGB BLD-MCNC: 13.1 G/DL — SIGNIFICANT CHANGE UP (ref 11.5–15.5)
HIV 1 & 2 AB SERPL IA.RAPID: SIGNIFICANT CHANGE UP
IMM GRANULOCYTES NFR BLD AUTO: 0.4 % — SIGNIFICANT CHANGE UP (ref 0–0.9)
KETONES UR-MCNC: NEGATIVE MG/DL — SIGNIFICANT CHANGE UP
LACTATE SERPL-SCNC: 1.4 MMOL/L — SIGNIFICANT CHANGE UP (ref 0.7–2)
LEUKOCYTE ESTERASE UR-ACNC: ABNORMAL
LIDOCAIN IGE QN: 43 U/L — SIGNIFICANT CHANGE UP (ref 13–75)
LYMPHOCYTES # BLD AUTO: 1.96 K/UL — SIGNIFICANT CHANGE UP (ref 1–3.3)
LYMPHOCYTES # BLD AUTO: 19.7 % — SIGNIFICANT CHANGE UP (ref 13–44)
MAGNESIUM SERPL-MCNC: 2 MG/DL — SIGNIFICANT CHANGE UP (ref 1.6–2.6)
MCHC RBC-ENTMCNC: 27.8 PG — SIGNIFICANT CHANGE UP (ref 27–34)
MCHC RBC-ENTMCNC: 33.5 G/DL — SIGNIFICANT CHANGE UP (ref 32–36)
MCV RBC AUTO: 82.8 FL — SIGNIFICANT CHANGE UP (ref 80–100)
MONOCYTES # BLD AUTO: 0.66 K/UL — SIGNIFICANT CHANGE UP (ref 0–0.9)
MONOCYTES NFR BLD AUTO: 6.6 % — SIGNIFICANT CHANGE UP (ref 2–14)
NEUTROPHILS # BLD AUTO: 6.84 K/UL — SIGNIFICANT CHANGE UP (ref 1.8–7.4)
NEUTROPHILS NFR BLD AUTO: 68.7 % — SIGNIFICANT CHANGE UP (ref 43–77)
NITRITE UR-MCNC: NEGATIVE — SIGNIFICANT CHANGE UP
NRBC # BLD: 0 /100 WBCS — SIGNIFICANT CHANGE UP (ref 0–0)
PH UR: 8 — SIGNIFICANT CHANGE UP (ref 5–8)
PHOSPHATE SERPL-MCNC: 3 MG/DL — SIGNIFICANT CHANGE UP (ref 2.5–4.5)
PLATELET # BLD AUTO: 296 K/UL — SIGNIFICANT CHANGE UP (ref 150–400)
POTASSIUM SERPL-MCNC: 4.4 MMOL/L — SIGNIFICANT CHANGE UP (ref 3.5–5.3)
POTASSIUM SERPL-SCNC: 4.4 MMOL/L — SIGNIFICANT CHANGE UP (ref 3.5–5.3)
PROT SERPL-MCNC: 7.5 G/DL — SIGNIFICANT CHANGE UP (ref 6–8.3)
PROT UR-MCNC: NEGATIVE MG/DL — SIGNIFICANT CHANGE UP
RBC # BLD: 4.72 M/UL — SIGNIFICANT CHANGE UP (ref 3.8–5.2)
RBC # FLD: 12.6 % — SIGNIFICANT CHANGE UP (ref 10.3–14.5)
RBC CASTS # UR COMP ASSIST: 2 /HPF — SIGNIFICANT CHANGE UP (ref 0–4)
SODIUM SERPL-SCNC: 137 MMOL/L — SIGNIFICANT CHANGE UP (ref 135–145)
SP GR SPEC: 1.01 — SIGNIFICANT CHANGE UP (ref 1–1.03)
TROPONIN I, HIGH SENSITIVITY RESULT: 3.8 NG/L — SIGNIFICANT CHANGE UP
UROBILINOGEN FLD QL: 0.2 MG/DL — SIGNIFICANT CHANGE UP (ref 0.2–1)
WBC # BLD: 9.96 K/UL — SIGNIFICANT CHANGE UP (ref 3.8–10.5)
WBC # FLD AUTO: 9.96 K/UL — SIGNIFICANT CHANGE UP (ref 3.8–10.5)
WBC UR QL: 8 /HPF — HIGH (ref 0–5)

## 2024-12-10 PROCEDURE — 83605 ASSAY OF LACTIC ACID: CPT

## 2024-12-10 PROCEDURE — 82962 GLUCOSE BLOOD TEST: CPT

## 2024-12-10 PROCEDURE — 83690 ASSAY OF LIPASE: CPT

## 2024-12-10 PROCEDURE — 36415 COLL VENOUS BLD VENIPUNCTURE: CPT

## 2024-12-10 PROCEDURE — 84484 ASSAY OF TROPONIN QUANT: CPT

## 2024-12-10 PROCEDURE — 76775 US EXAM ABDO BACK WALL LIM: CPT

## 2024-12-10 PROCEDURE — 84100 ASSAY OF PHOSPHORUS: CPT

## 2024-12-10 PROCEDURE — 85025 COMPLETE CBC W/AUTO DIFF WBC: CPT

## 2024-12-10 PROCEDURE — 76775 US EXAM ABDO BACK WALL LIM: CPT | Mod: 26

## 2024-12-10 PROCEDURE — 83735 ASSAY OF MAGNESIUM: CPT

## 2024-12-10 PROCEDURE — 84702 CHORIONIC GONADOTROPIN TEST: CPT

## 2024-12-10 PROCEDURE — 80053 COMPREHEN METABOLIC PANEL: CPT

## 2024-12-10 PROCEDURE — 81001 URINALYSIS AUTO W/SCOPE: CPT

## 2024-12-10 PROCEDURE — 99284 EMERGENCY DEPT VISIT MOD MDM: CPT

## 2024-12-10 PROCEDURE — 99284 EMERGENCY DEPT VISIT MOD MDM: CPT | Mod: 25

## 2024-12-10 PROCEDURE — 86803 HEPATITIS C AB TEST: CPT

## 2024-12-10 PROCEDURE — 86703 HIV-1/HIV-2 1 RESULT ANTBDY: CPT

## 2024-12-10 NOTE — ED PROVIDER NOTE - NSPTACCESSSVCSAPPTDETAILS_ED_ALL_ED_FT
Endocrinology for better management of diabetes  Urology for indeterminate lesion in left kidney  Neurology for chronic b/l feet pain/paresthesias

## 2024-12-10 NOTE — ED PROVIDER NOTE - PHYSICAL EXAMINATION
Gen:  Awake, alert, NAD, WDWN, NCAT, non-toxic appearing.   Eyes:  PERRL, EOMI, no icterus, normal lids/lashes, normal conjunctivae.  ENT:  External inspection normal, pink/moist membranes.   CV:  S1S2, regular rate and rhythm, no murmur/gallops/rubs, no JVD, 2+ pulses b/l, no edema/cords/homans, good capillary refill, warm/well-perfused.  Resp:  Normal respiratory rate/effort, no respiratory distress, normal voice, speaking full sentences, lungs clear to auscultation b/l, no wheezing/rales/rhonchi, no retractions, no stridor.  Abd:  Soft abdomen, no tender/distended/guarding/rebound, no pulsatile mass, no CVA tender.   Musculoskeletal:  N/V intact, FROM all 4 extremities, normal motor tone, stable gait.   Neck:  FROM neck, supple, trachea midline, no meningismus.   Skin:  Color normal for race, warm and dry, no rash.  Neuro:  Oriented x3, CN 2-12 intact (grossly), normal motor (grossly), normal sensory (grossly), GCS 15  Psych:  Behavior cooperative

## 2024-12-10 NOTE — ED ADULT NURSE NOTE - OBJECTIVE STATEMENT
Pt presents to the ED c/o BGL in the 200s today accompanied by nausea and urinary frequency. Pt denies any vomiting, diarrhea, SOB and chest pain.

## 2024-12-10 NOTE — ED ADULT NURSE NOTE - NS_ED_NURSE_TEACHING_TOPIC_ED_A_ED
PCP follow up, endocrinology follow up, return precautions. PCP follow up, urology follow up, endocrinology follow up, return precautions.

## 2024-12-10 NOTE — ED ADULT NURSE NOTE - NSFALLRISKINTERV_ED_ALL_ED

## 2024-12-10 NOTE — ED PROVIDER NOTE - OBJECTIVE STATEMENT
29 female with hx of anxiety, autism, POTS, DM, HTN, HLD & kidney stone.   Patient presenting to the ED reporting elevated blood sugar ~200-250 x 2 days.  Reports compliance with her Januvia.  No recent changes in diet.

## 2024-12-10 NOTE — ED PROVIDER NOTE - NS ED ROS FT
Constitutional: (-) fever (-) chills  HENT: (-) congestion (-) rhinorrhea (-) sore throat  Eyes: (-) pain (-) redness  Respiratory: (-) cough (-) shortness of breath (-) wheezing (-) stridor  Cardiovascular: (-) chest pain (-) palpitations (-) leg swelling  Gastrointestinal: (-) abdominal pain (-) blood in stool (no melena/hematochezia) (-) diarrhea (-) vomiting  Genitourinary: (-) dysuria (-) hematuria  Musculoskeletal: (-) gait problem (-) joint swelling (-) myalgias  Skin: (-) color change (-) rash  Neurological: (-) weakness (-) numbness (-) headaches  Psychiatric/Behavioral: (-) confusion

## 2024-12-10 NOTE — ED PROVIDER NOTE - NSFOLLOWUPINSTRUCTIONS_ED_ALL_ED_FT
Please follow up with your PMD or Medicine Clinic in 2-3 days.  Follow up with Endocrinology & Urology in 1 week.  Return to the ER for worsening or concerning symptoms.  Your results for testing will be available in the Follow My Health application which can be downloaded to your phone or checked online on a computer.  [email or web address]  See attached printed discharge papers for further information.  Take your Januvia as prescribed.

## 2024-12-10 NOTE — ED PROVIDER NOTE - NSFOLLOWUPCLINICSTOKEN_GEN_ALL_ED_FT
294627:4-6 Days|| ||00\01||False;291898:1-3 Days|| ||00\01||False;138906:4-6 Days|| ||00\01||False;045492:4-6 Days|| ||00\01||False;

## 2024-12-10 NOTE — ED PROVIDER NOTE - NSFOLLOWUPCLINICS_GEN_ALL_ED_FT
[facility]  Endocrinology  [address] [place] [address]  Phone: [phone]  Fax: [phone]  Follow Up Time: 4-6 Days    [facility]  Internal Medicine  [address] [place] [address]  Phone: [phone]  Fax: [phone]  Follow Up Time: 1-3 Days    [facility]  Neurology  [address] [place] [address]  Phone: [phone]  Fax: [phone]  Follow Up Time: 4-6 Days    [facility]  Urology  [address] [place] [address]  Phone: [phone]  Fax: [phone]  Follow Up Time: 4-6 Days

## 2024-12-10 NOTE — ED PROVIDER NOTE - PATIENT PORTAL LINK FT
You can access the FollowMyHealth Patient Portal offered by [facility] by registering at the following website: [email or web address]. By joining [employer]’s FollowMyHealth portal, you will also be able to view your health information using other applications (apps) compatible with our system.

## 2024-12-10 NOTE — ED PROVIDER NOTE - CLINICAL SUMMARY MEDICAL DECISION MAKING FREE TEXT BOX
29 female with hx of anxiety, autism, POTS, DM, HTN, HLD & kidney stone.   Patient presenting to the ED reporting elevated blood sugar ~200-250 x 2 days.  Reports compliance with her Januvia.  No recent changes in diet.    Vitals stable.  Nontoxic appearing, n/v intact.  Airway intact, no respiratory distress, no hypoxia.  No abdominal or CVA tenderness.      ED note 11/19/2024 reviewed; patient seen for bilateral lower extremity pain & tingling    Plan to obtain:  -Labs, US renal R/O hydronephrosis, IV fluids, analgesia/antiemetics needed, observe/reassess    ED Course:  Lab values with normal WBC, Hb, and platelets.  Normal electrolytes, renal function, LFTs, & glucose.  Anion gap normal.  UA with negative ketones.  No evidence of DKA at this time. Urine w 8 WBC & epithelial cells concerning for uwiped specimen. Culture pending as patient has not UTI symptoms.  IV fluids given  US showing no evidence of hydronephrosis; incidental findings discussed with patient.  Patient advised regarding need for close outpatient follow up.  Patient stable for further care in outpatient setting. No indication for inpatient admission at this time. Patient advised regarding symptomatic & supportive care and symptoms to prompt ED return. Strict return precautions provided.

## 2024-12-11 LAB
HCV AB S/CO SERPL IA: 0.08 S/CO — SIGNIFICANT CHANGE UP (ref 0–0.99)
HCV AB SERPL-IMP: SIGNIFICANT CHANGE UP

## 2024-12-17 ENCOUNTER — APPOINTMENT (OUTPATIENT)
Dept: ENDOCRINOLOGY | Facility: CLINIC | Age: 29
End: 2024-12-17
Payer: MEDICAID

## 2024-12-17 ENCOUNTER — APPOINTMENT (OUTPATIENT)
Dept: PSYCHIATRY | Facility: CLINIC | Age: 29
End: 2024-12-17

## 2024-12-17 VITALS
DIASTOLIC BLOOD PRESSURE: 76 MMHG | BODY MASS INDEX: 36.08 KG/M2 | TEMPERATURE: 97.3 F | OXYGEN SATURATION: 97 % | HEIGHT: 59 IN | HEART RATE: 75 BPM | RESPIRATION RATE: 16 BRPM | WEIGHT: 179 LBS | SYSTOLIC BLOOD PRESSURE: 115 MMHG

## 2024-12-17 DIAGNOSIS — E11.65 TYPE 2 DIABETES MELLITUS WITH HYPERGLYCEMIA: ICD-10-CM

## 2024-12-17 LAB
GLUCOSE BLDC GLUCOMTR-MCNC: 92
HBA1C MFR BLD HPLC: 6.2

## 2024-12-17 PROCEDURE — 99205 OFFICE O/P NEW HI 60 MIN: CPT

## 2024-12-17 PROCEDURE — 83036 HEMOGLOBIN GLYCOSYLATED A1C: CPT | Mod: QW

## 2024-12-17 PROCEDURE — 82962 GLUCOSE BLOOD TEST: CPT

## 2024-12-17 RX ORDER — LEVOTHYROXINE SODIUM 0.07 MG/1
75 TABLET ORAL
Refills: 0 | Status: ACTIVE | COMMUNITY

## 2024-12-17 RX ORDER — LINAGLIPTIN 5 MG/1
5 TABLET, FILM COATED ORAL DAILY
Qty: 90 | Refills: 3 | Status: DISCONTINUED | COMMUNITY
Start: 2024-12-17 | End: 2024-12-17

## 2024-12-17 RX ORDER — GLIPIZIDE 2.5 MG/1
2.5 TABLET ORAL DAILY
Qty: 90 | Refills: 3 | Status: ACTIVE | COMMUNITY
Start: 2024-12-17 | End: 1900-01-01

## 2024-12-17 RX ORDER — BLOOD-GLUCOSE SENSOR
EACH MISCELLANEOUS
Qty: 6 | Refills: 3 | Status: ACTIVE | COMMUNITY
Start: 2024-12-17 | End: 1900-01-01

## 2024-12-17 RX ORDER — BLOOD-GLUCOSE,RECEIVER,CONT
EACH MISCELLANEOUS
Qty: 1 | Refills: 0 | Status: ACTIVE | COMMUNITY
Start: 2024-12-17 | End: 1900-01-01

## 2024-12-17 RX ORDER — BLOOD-GLUCOSE METER
W/DEVICE KIT MISCELLANEOUS
Qty: 1 | Refills: 0 | Status: ACTIVE | COMMUNITY
Start: 2024-12-17 | End: 1900-01-01

## 2024-12-17 RX ORDER — BLOOD SUGAR DIAGNOSTIC
STRIP MISCELLANEOUS
Qty: 3 | Refills: 3 | Status: ACTIVE | COMMUNITY
Start: 2024-12-17 | End: 1900-01-01

## 2024-12-17 RX ORDER — METOPROLOL SUCCINATE 25 MG/1
25 TABLET, EXTENDED RELEASE ORAL
Refills: 0 | Status: ACTIVE | COMMUNITY

## 2024-12-17 RX ORDER — IVABRADINE 5 MG/1
5 TABLET, FILM COATED ORAL
Refills: 0 | Status: ACTIVE | COMMUNITY

## 2024-12-18 ENCOUNTER — NON-APPOINTMENT (OUTPATIENT)
Age: 29
End: 2024-12-18

## 2024-12-18 ENCOUNTER — APPOINTMENT (OUTPATIENT)
Dept: UROLOGY | Facility: CLINIC | Age: 29
End: 2024-12-18
Payer: MEDICAID

## 2024-12-18 VITALS
OXYGEN SATURATION: 98 % | HEART RATE: 69 BPM | HEIGHT: 59 IN | WEIGHT: 179 LBS | BODY MASS INDEX: 36.08 KG/M2 | RESPIRATION RATE: 16 BRPM | SYSTOLIC BLOOD PRESSURE: 112 MMHG | DIASTOLIC BLOOD PRESSURE: 74 MMHG

## 2024-12-18 VITALS — TEMPERATURE: 98.1 F

## 2024-12-18 DIAGNOSIS — N28.89 OTHER SPECIFIED DISORDERS OF KIDNEY AND URETER: ICD-10-CM

## 2024-12-18 DIAGNOSIS — R35.0 FREQUENCY OF MICTURITION: ICD-10-CM

## 2024-12-18 DIAGNOSIS — N20.0 CALCULUS OF KIDNEY: ICD-10-CM

## 2024-12-18 PROCEDURE — 99204 OFFICE O/P NEW MOD 45 MIN: CPT

## 2024-12-18 PROCEDURE — G2211 COMPLEX E/M VISIT ADD ON: CPT | Mod: NC

## 2024-12-20 ENCOUNTER — APPOINTMENT (OUTPATIENT)
Dept: PSYCHIATRY | Facility: CLINIC | Age: 29
End: 2024-12-20

## 2024-12-31 ENCOUNTER — APPOINTMENT (OUTPATIENT)
Dept: PSYCHIATRY | Facility: CLINIC | Age: 29
End: 2024-12-31
Payer: MEDICAID

## 2024-12-31 ENCOUNTER — OUTPATIENT (OUTPATIENT)
Dept: OUTPATIENT SERVICES | Facility: HOSPITAL | Age: 29
LOS: 1 days | End: 2024-12-31
Payer: MEDICAID

## 2024-12-31 DIAGNOSIS — F32.A DEPRESSION, UNSPECIFIED: ICD-10-CM

## 2024-12-31 DIAGNOSIS — F43.10 POST-TRAUMATIC STRESS DISORDER, UNSPECIFIED: ICD-10-CM

## 2024-12-31 DIAGNOSIS — F60.3 BORDERLINE PERSONALITY DISORDER: ICD-10-CM

## 2024-12-31 DIAGNOSIS — F41.9 ANXIETY DISORDER, UNSPECIFIED: ICD-10-CM

## 2024-12-31 PROCEDURE — 99213 OFFICE O/P EST LOW 20 MIN: CPT | Mod: 95

## 2024-12-31 PROCEDURE — ZZZZZ: CPT

## 2025-01-01 DIAGNOSIS — F43.10 POST-TRAUMATIC STRESS DISORDER, UNSPECIFIED: ICD-10-CM

## 2025-01-01 DIAGNOSIS — F60.3 BORDERLINE PERSONALITY DISORDER: ICD-10-CM

## 2025-01-13 ENCOUNTER — APPOINTMENT (OUTPATIENT)
Dept: CT IMAGING | Facility: CLINIC | Age: 30
End: 2025-01-13

## 2025-01-14 DIAGNOSIS — E03.9 HYPOTHYROIDISM, UNSPECIFIED: ICD-10-CM

## 2025-01-17 ENCOUNTER — APPOINTMENT (OUTPATIENT)
Dept: PSYCHIATRY | Facility: CLINIC | Age: 30
End: 2025-01-17

## 2025-01-17 ENCOUNTER — OUTPATIENT (OUTPATIENT)
Dept: OUTPATIENT SERVICES | Facility: HOSPITAL | Age: 30
LOS: 1 days | End: 2025-01-17
Payer: MEDICAID

## 2025-01-17 DIAGNOSIS — F32.A DEPRESSION, UNSPECIFIED: ICD-10-CM

## 2025-01-17 DIAGNOSIS — F60.3 BORDERLINE PERSONALITY DISORDER: ICD-10-CM

## 2025-01-17 DIAGNOSIS — F41.9 ANXIETY DISORDER, UNSPECIFIED: ICD-10-CM

## 2025-01-17 PROCEDURE — 90834 PSYTX W PT 45 MINUTES: CPT

## 2025-01-18 DIAGNOSIS — F41.9 ANXIETY DISORDER, UNSPECIFIED: ICD-10-CM

## 2025-01-18 DIAGNOSIS — F60.3 BORDERLINE PERSONALITY DISORDER: ICD-10-CM

## 2025-01-18 DIAGNOSIS — F32.A DEPRESSION, UNSPECIFIED: ICD-10-CM

## 2025-01-31 ENCOUNTER — APPOINTMENT (OUTPATIENT)
Dept: PSYCHIATRY | Facility: CLINIC | Age: 30
End: 2025-01-31

## 2025-01-31 ENCOUNTER — OUTPATIENT (OUTPATIENT)
Dept: OUTPATIENT SERVICES | Facility: HOSPITAL | Age: 30
LOS: 1 days | End: 2025-01-31
Payer: MEDICAID

## 2025-01-31 DIAGNOSIS — F60.3 BORDERLINE PERSONALITY DISORDER: ICD-10-CM

## 2025-01-31 DIAGNOSIS — F32.A DEPRESSION, UNSPECIFIED: ICD-10-CM

## 2025-01-31 DIAGNOSIS — F41.9 ANXIETY DISORDER, UNSPECIFIED: ICD-10-CM

## 2025-01-31 DIAGNOSIS — F43.10 POST-TRAUMATIC STRESS DISORDER, UNSPECIFIED: ICD-10-CM

## 2025-01-31 PROCEDURE — 90834 PSYTX W PT 45 MINUTES: CPT

## 2025-02-01 ENCOUNTER — EMERGENCY (EMERGENCY)
Facility: HOSPITAL | Age: 30
LOS: 1 days | Discharge: HOME | End: 2025-02-01
Attending: EMERGENCY MEDICINE
Payer: MEDICAID

## 2025-02-01 VITALS
DIASTOLIC BLOOD PRESSURE: 85 MMHG | SYSTOLIC BLOOD PRESSURE: 122 MMHG | HEART RATE: 82 BPM | OXYGEN SATURATION: 97 % | TEMPERATURE: 98 F | RESPIRATION RATE: 16 BRPM | HEIGHT: 59 IN | WEIGHT: 149.03 LBS

## 2025-02-01 VITALS
HEART RATE: 60 BPM | SYSTOLIC BLOOD PRESSURE: 121 MMHG | RESPIRATION RATE: 17 BRPM | TEMPERATURE: 98 F | DIASTOLIC BLOOD PRESSURE: 80 MMHG | OXYGEN SATURATION: 98 %

## 2025-02-01 DIAGNOSIS — F60.3 BORDERLINE PERSONALITY DISORDER: ICD-10-CM

## 2025-02-01 DIAGNOSIS — F32.A DEPRESSION, UNSPECIFIED: ICD-10-CM

## 2025-02-01 DIAGNOSIS — F43.10 POST-TRAUMATIC STRESS DISORDER, UNSPECIFIED: ICD-10-CM

## 2025-02-01 DIAGNOSIS — F41.9 ANXIETY DISORDER, UNSPECIFIED: ICD-10-CM

## 2025-02-01 LAB
ALBUMIN SERPL ELPH-MCNC: 4.1 G/DL — SIGNIFICANT CHANGE UP (ref 3.5–5)
ALP SERPL-CCNC: 74 U/L — SIGNIFICANT CHANGE UP (ref 40–120)
ALT FLD-CCNC: 31 U/L DA — SIGNIFICANT CHANGE UP (ref 10–60)
ANION GAP SERPL CALC-SCNC: 2 MMOL/L — LOW (ref 5–17)
APPEARANCE UR: CLEAR — SIGNIFICANT CHANGE UP
AST SERPL-CCNC: 27 U/L — SIGNIFICANT CHANGE UP (ref 10–40)
BASOPHILS # BLD AUTO: 0.04 K/UL — SIGNIFICANT CHANGE UP (ref 0–0.2)
BASOPHILS NFR BLD AUTO: 0.3 % — SIGNIFICANT CHANGE UP (ref 0–2)
BILIRUB SERPL-MCNC: 0.3 MG/DL — SIGNIFICANT CHANGE UP (ref 0.2–1.2)
BILIRUB UR-MCNC: NEGATIVE — SIGNIFICANT CHANGE UP
BUN SERPL-MCNC: 10 MG/DL — SIGNIFICANT CHANGE UP (ref 7–18)
CALCIUM SERPL-MCNC: 9.4 MG/DL — SIGNIFICANT CHANGE UP (ref 8.4–10.5)
CHLORIDE SERPL-SCNC: 109 MMOL/L — HIGH (ref 96–108)
CO2 SERPL-SCNC: 26 MMOL/L — SIGNIFICANT CHANGE UP (ref 22–31)
COLOR SPEC: YELLOW — SIGNIFICANT CHANGE UP
CREAT SERPL-MCNC: 0.82 MG/DL — SIGNIFICANT CHANGE UP (ref 0.5–1.3)
DIFF PNL FLD: NEGATIVE — SIGNIFICANT CHANGE UP
EGFR: 98 ML/MIN/1.73M2 — SIGNIFICANT CHANGE UP
EOSINOPHIL # BLD AUTO: 0.12 K/UL — SIGNIFICANT CHANGE UP (ref 0–0.5)
EOSINOPHIL NFR BLD AUTO: 1 % — SIGNIFICANT CHANGE UP (ref 0–6)
GLUCOSE SERPL-MCNC: 139 MG/DL — HIGH (ref 70–99)
GLUCOSE UR QL: NEGATIVE MG/DL — SIGNIFICANT CHANGE UP
HCG SERPL-ACNC: <1 MIU/ML — SIGNIFICANT CHANGE UP
HCT VFR BLD CALC: 42.2 % — SIGNIFICANT CHANGE UP (ref 34.5–45)
HGB BLD-MCNC: 13.9 G/DL — SIGNIFICANT CHANGE UP (ref 11.5–15.5)
IMM GRANULOCYTES NFR BLD AUTO: 0.3 % — SIGNIFICANT CHANGE UP (ref 0–0.9)
KETONES UR-MCNC: 15 MG/DL
LACTATE SERPL-SCNC: 1.5 MMOL/L — SIGNIFICANT CHANGE UP (ref 0.7–2)
LEUKOCYTE ESTERASE UR-ACNC: NEGATIVE — SIGNIFICANT CHANGE UP
LIDOCAIN IGE QN: 34 U/L — SIGNIFICANT CHANGE UP (ref 13–75)
LYMPHOCYTES # BLD AUTO: 0.74 K/UL — LOW (ref 1–3.3)
LYMPHOCYTES # BLD AUTO: 5.9 % — LOW (ref 13–44)
MCHC RBC-ENTMCNC: 27.3 PG — SIGNIFICANT CHANGE UP (ref 27–34)
MCHC RBC-ENTMCNC: 32.9 G/DL — SIGNIFICANT CHANGE UP (ref 32–36)
MCV RBC AUTO: 82.7 FL — SIGNIFICANT CHANGE UP (ref 80–100)
MONOCYTES # BLD AUTO: 0.75 K/UL — SIGNIFICANT CHANGE UP (ref 0–0.9)
MONOCYTES NFR BLD AUTO: 6 % — SIGNIFICANT CHANGE UP (ref 2–14)
NEUTROPHILS # BLD AUTO: 10.75 K/UL — HIGH (ref 1.8–7.4)
NEUTROPHILS NFR BLD AUTO: 86.5 % — HIGH (ref 43–77)
NITRITE UR-MCNC: NEGATIVE — SIGNIFICANT CHANGE UP
NRBC # BLD: 0 /100 WBCS — SIGNIFICANT CHANGE UP (ref 0–0)
NRBC BLD-RTO: 0 /100 WBCS — SIGNIFICANT CHANGE UP (ref 0–0)
PH UR: 6 — SIGNIFICANT CHANGE UP (ref 5–8)
PLATELET # BLD AUTO: 276 K/UL — SIGNIFICANT CHANGE UP (ref 150–400)
POTASSIUM SERPL-MCNC: 4.2 MMOL/L — SIGNIFICANT CHANGE UP (ref 3.5–5.3)
POTASSIUM SERPL-SCNC: 4.2 MMOL/L — SIGNIFICANT CHANGE UP (ref 3.5–5.3)
PROT SERPL-MCNC: 8.5 G/DL — HIGH (ref 6–8.3)
PROT UR-MCNC: NEGATIVE MG/DL — SIGNIFICANT CHANGE UP
RBC # BLD: 5.1 M/UL — SIGNIFICANT CHANGE UP (ref 3.8–5.2)
RBC # FLD: 13.3 % — SIGNIFICANT CHANGE UP (ref 10.3–14.5)
SODIUM SERPL-SCNC: 137 MMOL/L — SIGNIFICANT CHANGE UP (ref 135–145)
SP GR SPEC: 1.02 — SIGNIFICANT CHANGE UP (ref 1–1.03)
UROBILINOGEN FLD QL: 0.2 MG/DL — SIGNIFICANT CHANGE UP (ref 0.2–1)
WBC # BLD: 12.44 K/UL — HIGH (ref 3.8–10.5)
WBC # FLD AUTO: 12.44 K/UL — HIGH (ref 3.8–10.5)

## 2025-02-01 PROCEDURE — 96374 THER/PROPH/DIAG INJ IV PUSH: CPT | Mod: XU

## 2025-02-01 PROCEDURE — 84702 CHORIONIC GONADOTROPIN TEST: CPT

## 2025-02-01 PROCEDURE — 36415 COLL VENOUS BLD VENIPUNCTURE: CPT

## 2025-02-01 PROCEDURE — 80053 COMPREHEN METABOLIC PANEL: CPT

## 2025-02-01 PROCEDURE — 85025 COMPLETE CBC W/AUTO DIFF WBC: CPT

## 2025-02-01 PROCEDURE — 81003 URINALYSIS AUTO W/O SCOPE: CPT

## 2025-02-01 PROCEDURE — 83605 ASSAY OF LACTIC ACID: CPT

## 2025-02-01 PROCEDURE — 99285 EMERGENCY DEPT VISIT HI MDM: CPT

## 2025-02-01 PROCEDURE — 83690 ASSAY OF LIPASE: CPT

## 2025-02-01 PROCEDURE — 74177 CT ABD & PELVIS W/CONTRAST: CPT | Mod: 26

## 2025-02-01 PROCEDURE — 99285 EMERGENCY DEPT VISIT HI MDM: CPT | Mod: 25

## 2025-02-01 PROCEDURE — 74177 CT ABD & PELVIS W/CONTRAST: CPT | Mod: MC

## 2025-02-01 RX ORDER — ALUMINUM HYDROXIDE, MAGNESIUM HYDROXIDE, DIMETHICONE 400; 40; 400 MG/5ML; MG/5ML; MG/5ML
30 SUSPENSION ORAL
Qty: 600 | Refills: 0
Start: 2025-02-01 | End: 2025-02-05

## 2025-02-01 RX ORDER — ONDANSETRON 4 MG/1
4 TABLET, ORALLY DISINTEGRATING ORAL ONCE
Refills: 0 | Status: COMPLETED | OUTPATIENT
Start: 2025-02-01 | End: 2025-02-01

## 2025-02-01 RX ORDER — BACTERIOSTATIC SODIUM CHLORIDE 0.9 %
1000 VIAL (ML) INJECTION ONCE
Refills: 0 | Status: COMPLETED | OUTPATIENT
Start: 2025-02-01 | End: 2025-02-01

## 2025-02-01 RX ORDER — MAGNESIUM, ALUMINUM HYDROXIDE 200-225/5
30 SUSPENSION, ORAL (FINAL DOSE FORM) ORAL ONCE
Refills: 0 | Status: COMPLETED | OUTPATIENT
Start: 2025-02-01 | End: 2025-02-01

## 2025-02-01 RX ORDER — ONDANSETRON 4 MG/1
1 TABLET, ORALLY DISINTEGRATING ORAL
Qty: 15 | Refills: 0
Start: 2025-02-01 | End: 2025-02-05

## 2025-02-01 RX ADMIN — ONDANSETRON 4 MILLIGRAM(S): 4 TABLET, ORALLY DISINTEGRATING ORAL at 14:10

## 2025-02-01 RX ADMIN — Medication 30 MILLILITER(S): at 14:11

## 2025-02-01 RX ADMIN — Medication 1000 MILLILITER(S): at 14:11

## 2025-02-01 NOTE — ED PROVIDER NOTE - PROGRESS NOTE DETAILS
Ndiaye: no acute findings on ct. pt c/o throat pain. abc intact, maintaining oral secretions. no swelling, speaking clearly w/o stridor. dc

## 2025-02-01 NOTE — ED PROVIDER NOTE - OBJECTIVE STATEMENT
29-year-old female history of autism, anxiety, DM, H LD, HTN, POTS, kidney stones presenting with 3 days of fever, chills, scratchy throat now with nausea vomiting and diarrhea with lower abdominal pain since yesterday.  Reports being compliant with Januvia.  Denies having any shortness of breath, headache or urinary symptoms.

## 2025-02-01 NOTE — ED PROVIDER NOTE - CLINICAL SUMMARY MEDICAL DECISION MAKING FREE TEXT BOX
29-year-old female presenting with constitutional symptoms along with nausea vomiting diarrhea and lower abdominal pain.  Symptoms may suggest acute enteritis, appendicitis, UTI, DKA among other causes.  Plan to perform labs, serum ketones, urinalysis, CT abdomen pelvis and reassess.

## 2025-02-01 NOTE — ED ADULT NURSE NOTE - OBJECTIVE STATEMENT
Patient came  to the ED a/o x 3 ambulates c/o fever and cough x 4 days and vomiting x yesterday, + slight generalized abdominal pain

## 2025-02-01 NOTE — ED PROVIDER NOTE - NSFOLLOWUPINSTRUCTIONS_ED_ALL_ED_FT
gastroenteritis - replace loss with gatorade/coconut water/pedialyte/soups, slowly advance diet (bread/wheat/toast/soft diet). symptoms should last 24-48 hrs. can use imodium 2mg as long as no blood in stool or fever. see your MD.

## 2025-02-01 NOTE — ED PROVIDER NOTE - PATIENT PORTAL LINK FT
You can access the FollowMyHealth Patient Portal offered by [facility] by registering at the following website: [email or web address]. By joining [employer]’s FollowMyHealth portal, you will also be able to view your health information using other applications (apps) compatible with our system.

## 2025-02-02 ENCOUNTER — EMERGENCY (EMERGENCY)
Facility: HOSPITAL | Age: 30
LOS: 1 days | Discharge: HOME | End: 2025-02-02
Attending: EMERGENCY MEDICINE
Payer: MEDICAID

## 2025-02-02 VITALS
RESPIRATION RATE: 18 BRPM | TEMPERATURE: 98 F | SYSTOLIC BLOOD PRESSURE: 118 MMHG | DIASTOLIC BLOOD PRESSURE: 74 MMHG | OXYGEN SATURATION: 99 % | HEART RATE: 68 BPM

## 2025-02-02 VITALS
WEIGHT: 175.05 LBS | TEMPERATURE: 98 F | DIASTOLIC BLOOD PRESSURE: 78 MMHG | OXYGEN SATURATION: 99 % | HEIGHT: 59 IN | RESPIRATION RATE: 18 BRPM | HEART RATE: 72 BPM | SYSTOLIC BLOOD PRESSURE: 111 MMHG

## 2025-02-02 LAB
ALBUMIN SERPL ELPH-MCNC: 3.6 G/DL — SIGNIFICANT CHANGE UP (ref 3.5–5)
ALP SERPL-CCNC: 70 U/L — SIGNIFICANT CHANGE UP (ref 40–120)
ALT FLD-CCNC: 28 U/L DA — SIGNIFICANT CHANGE UP (ref 10–60)
ANION GAP SERPL CALC-SCNC: 1 MMOL/L — LOW (ref 5–17)
AST SERPL-CCNC: 17 U/L — SIGNIFICANT CHANGE UP (ref 10–40)
BASOPHILS # BLD AUTO: 0.04 K/UL — SIGNIFICANT CHANGE UP (ref 0–0.2)
BASOPHILS NFR BLD AUTO: 0.4 % — SIGNIFICANT CHANGE UP (ref 0–2)
BILIRUB SERPL-MCNC: 0.3 MG/DL — SIGNIFICANT CHANGE UP (ref 0.2–1.2)
BUN SERPL-MCNC: 6 MG/DL — LOW (ref 7–18)
CALCIUM SERPL-MCNC: 9 MG/DL — SIGNIFICANT CHANGE UP (ref 8.4–10.5)
CHLORIDE SERPL-SCNC: 108 MMOL/L — SIGNIFICANT CHANGE UP (ref 96–108)
CO2 SERPL-SCNC: 26 MMOL/L — SIGNIFICANT CHANGE UP (ref 22–31)
CREAT SERPL-MCNC: 0.69 MG/DL — SIGNIFICANT CHANGE UP (ref 0.5–1.3)
EGFR: 119 ML/MIN/1.73M2 — SIGNIFICANT CHANGE UP
EOSINOPHIL # BLD AUTO: 0.15 K/UL — SIGNIFICANT CHANGE UP (ref 0–0.5)
EOSINOPHIL NFR BLD AUTO: 1.4 % — SIGNIFICANT CHANGE UP (ref 0–6)
GLUCOSE SERPL-MCNC: 90 MG/DL — SIGNIFICANT CHANGE UP (ref 70–99)
HCG SERPL-ACNC: <1 MIU/ML — SIGNIFICANT CHANGE UP
HCT VFR BLD CALC: 38.5 % — SIGNIFICANT CHANGE UP (ref 34.5–45)
HGB BLD-MCNC: 13.1 G/DL — SIGNIFICANT CHANGE UP (ref 11.5–15.5)
IMM GRANULOCYTES NFR BLD AUTO: 0.5 % — SIGNIFICANT CHANGE UP (ref 0–0.9)
LYMPHOCYTES # BLD AUTO: 1.56 K/UL — SIGNIFICANT CHANGE UP (ref 1–3.3)
LYMPHOCYTES # BLD AUTO: 14.1 % — SIGNIFICANT CHANGE UP (ref 13–44)
MCHC RBC-ENTMCNC: 27.3 PG — SIGNIFICANT CHANGE UP (ref 27–34)
MCHC RBC-ENTMCNC: 34 G/DL — SIGNIFICANT CHANGE UP (ref 32–36)
MCV RBC AUTO: 80.4 FL — SIGNIFICANT CHANGE UP (ref 80–100)
MONOCYTES # BLD AUTO: 0.75 K/UL — SIGNIFICANT CHANGE UP (ref 0–0.9)
MONOCYTES NFR BLD AUTO: 6.8 % — SIGNIFICANT CHANGE UP (ref 2–14)
NEUTROPHILS # BLD AUTO: 8.48 K/UL — HIGH (ref 1.8–7.4)
NEUTROPHILS NFR BLD AUTO: 76.8 % — SIGNIFICANT CHANGE UP (ref 43–77)
NRBC # BLD: 0 /100 WBCS — SIGNIFICANT CHANGE UP (ref 0–0)
NRBC BLD-RTO: 0 /100 WBCS — SIGNIFICANT CHANGE UP (ref 0–0)
PLATELET # BLD AUTO: 281 K/UL — SIGNIFICANT CHANGE UP (ref 150–400)
POTASSIUM SERPL-MCNC: 3.8 MMOL/L — SIGNIFICANT CHANGE UP (ref 3.5–5.3)
POTASSIUM SERPL-SCNC: 3.8 MMOL/L — SIGNIFICANT CHANGE UP (ref 3.5–5.3)
PROT SERPL-MCNC: 8.1 G/DL — SIGNIFICANT CHANGE UP (ref 6–8.3)
RBC # BLD: 4.79 M/UL — SIGNIFICANT CHANGE UP (ref 3.8–5.2)
RBC # FLD: 13.4 % — SIGNIFICANT CHANGE UP (ref 10.3–14.5)
SODIUM SERPL-SCNC: 135 MMOL/L — SIGNIFICANT CHANGE UP (ref 135–145)
WBC # BLD: 11.04 K/UL — HIGH (ref 3.8–10.5)
WBC # FLD AUTO: 11.04 K/UL — HIGH (ref 3.8–10.5)

## 2025-02-02 PROCEDURE — 70491 CT SOFT TISSUE NECK W/DYE: CPT | Mod: MC

## 2025-02-02 PROCEDURE — 85025 COMPLETE CBC W/AUTO DIFF WBC: CPT

## 2025-02-02 PROCEDURE — 36415 COLL VENOUS BLD VENIPUNCTURE: CPT

## 2025-02-02 PROCEDURE — 96361 HYDRATE IV INFUSION ADD-ON: CPT

## 2025-02-02 PROCEDURE — 70491 CT SOFT TISSUE NECK W/DYE: CPT | Mod: 26

## 2025-02-02 PROCEDURE — 96374 THER/PROPH/DIAG INJ IV PUSH: CPT | Mod: XU

## 2025-02-02 PROCEDURE — 80053 COMPREHEN METABOLIC PANEL: CPT

## 2025-02-02 PROCEDURE — 99285 EMERGENCY DEPT VISIT HI MDM: CPT

## 2025-02-02 PROCEDURE — 84702 CHORIONIC GONADOTROPIN TEST: CPT

## 2025-02-02 PROCEDURE — 82962 GLUCOSE BLOOD TEST: CPT

## 2025-02-02 PROCEDURE — 99284 EMERGENCY DEPT VISIT MOD MDM: CPT | Mod: 25

## 2025-02-02 PROCEDURE — 96375 TX/PRO/DX INJ NEW DRUG ADDON: CPT | Mod: XU

## 2025-02-02 RX ORDER — DM/PSEUDOEPHED/ACETAMINOPHEN 10-30-250
25 CAPSULE ORAL ONCE
Refills: 0 | Status: COMPLETED | OUTPATIENT
Start: 2025-02-02 | End: 2025-02-02

## 2025-02-02 RX ORDER — BACTERIOSTATIC SODIUM CHLORIDE 0.9 %
1000 VIAL (ML) INJECTION ONCE
Refills: 0 | Status: COMPLETED | OUTPATIENT
Start: 2025-02-02 | End: 2025-02-02

## 2025-02-02 RX ORDER — KETOROLAC TROMETHAMINE 10 MG
15 TABLET ORAL ONCE
Refills: 0 | Status: DISCONTINUED | OUTPATIENT
Start: 2025-02-02 | End: 2025-02-02

## 2025-02-02 RX ORDER — ONDANSETRON 4 MG/1
4 TABLET, ORALLY DISINTEGRATING ORAL ONCE
Refills: 0 | Status: COMPLETED | OUTPATIENT
Start: 2025-02-02 | End: 2025-02-02

## 2025-02-02 RX ADMIN — Medication 1000 MILLILITER(S): at 21:18

## 2025-02-02 RX ADMIN — Medication 1000 MILLILITER(S): at 20:18

## 2025-02-02 RX ADMIN — Medication 25 GRAM(S): at 20:46

## 2025-02-02 RX ADMIN — ONDANSETRON 4 MILLIGRAM(S): 4 TABLET, ORALLY DISINTEGRATING ORAL at 20:18

## 2025-02-02 RX ADMIN — Medication 15 MILLIGRAM(S): at 20:18

## 2025-02-02 RX ADMIN — Medication 15 MILLIGRAM(S): at 21:18

## 2025-02-02 NOTE — ED PROVIDER NOTE - PHYSICAL EXAMINATION
Neck supple and full range of motion.  Left-sided submental fullness and tenderness to palpation.  Mild left anterior cervical lymphadenopathy.  Uvula midline without swelling.  No tonsillar or peritonsillar swelling.  No trismus.

## 2025-02-02 NOTE — ED PROVIDER NOTE - OBJECTIVE STATEMENT
29-year-old female history of autism, anxiety, DM, H LD, HTN, POTS, kidney stones, Presents with worsening left-sided neck pain with throat pain and fever for 4 days.  Reports a difficulty swallowing.  Denies any difficulty breathing, chest pain, throat closing sensation, drooling or any other complaints.  Was eval yesterday and discharged home with diagnosis of viral gastroenteritis. 29-year-old trans patient (wants to be referred as THEY), history of autism, anxiety, DM, H LD, HTN, POTS, kidney stones, Presents with worsening left-sided neck pain with throat pain and fever for 4 days.  Reports a difficulty swallowing.  Denies any difficulty breathing, chest pain, throat closing sensation, drooling or any other complaints.  Was eval yesterday and discharged home with diagnosis of viral gastroenteritis.

## 2025-02-02 NOTE — ED PROVIDER NOTE - NSFOLLOWUPINSTRUCTIONS_ED_ALL_ED_FT
Follow-up with the primary care doctor within 2-3 days.  Follow-up with the ENT within 1 week.  For pain you can take over the counter Ibuprofen 600 mg orally every 6 hours as needed for pain. Take medication with food.   Warm compress 4-5 times per day for 10 minutes each time.  If you experience any new or worsening symptoms or if you are concerned you can always come back to the emergency for a re-evaluation.  Some results may not be available at the time of your discharge from the hospital. You can download the FOLLOW MY HEALTH jayden and have access to these results.  If there were any prescriptions given to you during the visit today take them as prescribed. If you have any questions you can ask the pharmacist.

## 2025-02-02 NOTE — ED PROVIDER NOTE - CLINICAL SUMMARY MEDICAL DECISION MAKING FREE TEXT BOX
independent 29-year-old  trans patient, presents  with left-sided throat pain and worsening facial swelling.  Nontoxic-appearing, no signs of airway compromise.  Labs grossly markable minimal leukocytosis with a WBC 11.  CT obtained showing sialoadenitis without sialolithiasis.  No signs of airway edema or abscess formation.  Patient is allergic to penicillin, amoxicillin, cephalexin and clindamycin limited options for antibiotic treatment.  Patient is already on Levaquin.  Will advised to finish the antibiotic.  Will advised a warm compress and gentle massage to the area.  Should follow-up with ENT within 1 week.  Discussed return instructions.

## 2025-02-02 NOTE — ED ADULT NURSE NOTE - OBJECTIVE STATEMENT
AOX4 +ambulatory patient reports here for L sided neck pain x 4 days with difficulty swallowing. Patient speaking in full sentences no drooling no chest pains

## 2025-02-02 NOTE — ED PROVIDER NOTE - ATTENDING APP SHARED VISIT CONTRIBUTION OF CARE
29-year-old  trans patient, presents  with left-sided throat pain and worsening facial swelling.  Nontoxic-appearing, no signs of airway compromise.  Labs grossly markable minimal leukocytosis with a WBC 11.  CT obtained showing sialoadenitis without sialolithiasis.  No signs of airway edema or abscess formation.  Patient is allergic to penicillin, amoxicillin, cephalexin and clindamycin limited options for antibiotic treatment.  Patient is already on Levaquin.  Will advised to finish the antibiotic.  Will advised a warm compress and gentle massage to the area.  Should follow-up with ENT within 1 week.  Discussed return instructions.

## 2025-02-02 NOTE — ED PROVIDER NOTE - PATIENT PORTAL LINK FT
You can access the FollowMyHealth Patient Portal offered by [facility] by registering at the following website: [email or web address]. By joining [employer]’s FollowMyHealth portal, you will also be able to view your health information using other applications (apps) compatible with our system.

## 2025-02-07 ENCOUNTER — APPOINTMENT (OUTPATIENT)
Dept: PSYCHIATRY | Facility: CLINIC | Age: 30
End: 2025-02-07

## 2025-02-07 ENCOUNTER — OUTPATIENT (OUTPATIENT)
Dept: OUTPATIENT SERVICES | Facility: HOSPITAL | Age: 30
LOS: 1 days | End: 2025-02-07
Payer: MEDICAID

## 2025-02-07 DIAGNOSIS — F60.3 BORDERLINE PERSONALITY DISORDER: ICD-10-CM

## 2025-02-07 DIAGNOSIS — F32.A DEPRESSION, UNSPECIFIED: ICD-10-CM

## 2025-02-07 PROCEDURE — 90832 PSYTX W PT 30 MINUTES: CPT

## 2025-02-08 DIAGNOSIS — F32.A DEPRESSION, UNSPECIFIED: ICD-10-CM

## 2025-02-08 DIAGNOSIS — F60.3 BORDERLINE PERSONALITY DISORDER: ICD-10-CM

## 2025-02-14 ENCOUNTER — OUTPATIENT (OUTPATIENT)
Dept: OUTPATIENT SERVICES | Facility: HOSPITAL | Age: 30
LOS: 1 days | End: 2025-02-14
Payer: MEDICAID

## 2025-02-14 ENCOUNTER — APPOINTMENT (OUTPATIENT)
Dept: PSYCHIATRY | Facility: CLINIC | Age: 30
End: 2025-02-14

## 2025-02-14 DIAGNOSIS — F60.3 BORDERLINE PERSONALITY DISORDER: ICD-10-CM

## 2025-02-14 DIAGNOSIS — F32.A DEPRESSION, UNSPECIFIED: ICD-10-CM

## 2025-02-14 DIAGNOSIS — F43.10 POST-TRAUMATIC STRESS DISORDER, UNSPECIFIED: ICD-10-CM

## 2025-02-14 PROCEDURE — 90834 PSYTX W PT 45 MINUTES: CPT

## 2025-02-15 DIAGNOSIS — F60.3 BORDERLINE PERSONALITY DISORDER: ICD-10-CM

## 2025-02-15 DIAGNOSIS — F43.10 POST-TRAUMATIC STRESS DISORDER, UNSPECIFIED: ICD-10-CM

## 2025-02-15 DIAGNOSIS — F32.A DEPRESSION, UNSPECIFIED: ICD-10-CM

## 2025-02-15 DIAGNOSIS — F41.9 ANXIETY DISORDER, UNSPECIFIED: ICD-10-CM

## 2025-02-27 ENCOUNTER — APPOINTMENT (OUTPATIENT)
Dept: PSYCHIATRY | Facility: CLINIC | Age: 30
End: 2025-02-27

## 2025-02-28 ENCOUNTER — APPOINTMENT (OUTPATIENT)
Dept: PSYCHIATRY | Facility: CLINIC | Age: 30
End: 2025-02-28

## 2025-03-04 ENCOUNTER — APPOINTMENT (OUTPATIENT)
Dept: PSYCHIATRY | Facility: CLINIC | Age: 30
End: 2025-03-04
Payer: MEDICAID

## 2025-03-04 DIAGNOSIS — F41.9 ANXIETY DISORDER, UNSPECIFIED: ICD-10-CM

## 2025-03-04 DIAGNOSIS — F43.10 POST-TRAUMATIC STRESS DISORDER, UNSPECIFIED: ICD-10-CM

## 2025-03-04 DIAGNOSIS — F60.3 BORDERLINE PERSONALITY DISORDER: ICD-10-CM

## 2025-03-04 DIAGNOSIS — F32.A DEPRESSION, UNSPECIFIED: ICD-10-CM

## 2025-03-04 PROCEDURE — ZZZZZ: CPT

## 2025-06-16 NOTE — ED PROVIDER NOTE - NSFOLLOWUPINSTRUCTIONS_ED_ALL_ED_FT
Instructions: This plan will send the code FBSE to the PM system.  DO NOT or CHANGE the price.
Price (Do Not Change): 0.00
Detail Level: Simple
Follow up with your primary care doctor in 1-2 days     Paresthesia    WHAT YOU NEED TO KNOW:    Paresthesia is numbness, tingling, or burning. It can happen in any part of your body, but usually occurs in your legs, feet, arms, or hands.    DISCHARGE INSTRUCTIONS:    Return to the emergency department if:     You have severe pain along with numbness and tingling.      Your legs suddenly become cold. You have trouble moving your legs, and they ache.      You have increased weakness in a part of your body.      You have uncontrolled movements.    Contact your healthcare provider or neurologist if:     Your symptoms do not improve.      You have symptoms in more than one part of your body.      You have questions or concerns about your condition or care.    Manage paresthesia:     Protect the area from injury. You may injure or burn yourself if you lose feeling in the area. Be careful when you touch anything that could be hot. Wear sturdy shoes to protect your feet. Ask about other ways to protect yourself.       Go to physical or occupational therapy if directed. Your provider may recommend therapy if you have a condition such as carpal tunnel syndrome. A physical therapist can teach you exercises to help strengthen the area or increase your ability to move it. An occupational therapist can help you find new ways to do your daily activities.      Manage health conditions that can cause paresthesia. Work with your diabetes specialist if you have uncontrolled diabetes. A dietitian or your healthcare provider can help you create a meal plan if you have low vitamin B levels. Your provider can help you manage your health if you have multiple sclerosis or you had a stroke. It is important to manage health conditions to stop paresthesia or prevent it from getting worse.    Follow up with your healthcare provider or neurologist as directed: Your healthcare provider may refer you to a specialist. Write down your questions so you remember to ask them during your visits.       © Copyright [employer] 2019 All illustrations and images included in CareNotes are the copyrighted property of [employer] or [employer].